# Patient Record
Sex: MALE | Race: WHITE | Employment: OTHER | URBAN - METROPOLITAN AREA
[De-identification: names, ages, dates, MRNs, and addresses within clinical notes are randomized per-mention and may not be internally consistent; named-entity substitution may affect disease eponyms.]

---

## 2017-07-05 LAB — DIABETIC RETINOPATHY: NORMAL

## 2018-01-30 PROBLEM — Q90.9 DOWN SYNDROME: Status: ACTIVE | Noted: 2018-01-30

## 2018-01-30 RX ORDER — FEXOFENADINE HCL 180 MG/1
180 TABLET ORAL
COMMUNITY
End: 2018-02-05 | Stop reason: CLARIF

## 2018-01-30 RX ORDER — FEXOFENADINE HCL AND PSEUDOEPHEDRINE HCI 60; 120 MG/1; MG/1
1 TABLET, EXTENDED RELEASE ORAL
COMMUNITY
Start: 2017-10-13 | End: 2018-02-05 | Stop reason: CLARIF

## 2018-01-30 RX ORDER — LAMOTRIGINE 25 MG/1
TABLET ORAL
COMMUNITY
Start: 2017-07-24 | End: 2019-01-14

## 2018-01-30 RX ORDER — LANCETS 30 GAUGE
EACH MISCELLANEOUS
COMMUNITY
Start: 2017-06-07 | End: 2022-01-12

## 2018-01-30 RX ORDER — TRAZODONE HYDROCHLORIDE 100 MG/1
TABLET ORAL
COMMUNITY
Start: 2017-07-10

## 2018-01-30 RX ORDER — FLUCONAZOLE 100 MG/1
100 TABLET ORAL
COMMUNITY
Start: 2015-04-18 | End: 2018-02-05 | Stop reason: CLARIF

## 2018-01-30 RX ORDER — LORATADINE 10 MG/1
TABLET ORAL
COMMUNITY
Start: 2017-07-10 | End: 2018-08-23

## 2018-01-30 RX ORDER — VENLAFAXINE HYDROCHLORIDE 150 MG/1
150 CAPSULE, EXTENDED RELEASE ORAL DAILY
COMMUNITY
Start: 2010-12-02

## 2018-01-30 RX ORDER — LISINOPRIL AND HYDROCHLOROTHIAZIDE 12.5; 1 MG/1; MG/1
TABLET ORAL
COMMUNITY
Start: 2018-01-02 | End: 2019-01-14 | Stop reason: SDUPTHER

## 2018-01-30 RX ORDER — BLOOD SUGAR DIAGNOSTIC
STRIP MISCELLANEOUS
COMMUNITY
Start: 2017-10-31 | End: 2018-04-23 | Stop reason: SDUPTHER

## 2018-01-30 RX ORDER — LEVOTHYROXINE SODIUM 112 UG/1
TABLET ORAL
COMMUNITY
Start: 2017-10-31 | End: 2018-04-23 | Stop reason: SDUPTHER

## 2018-01-30 RX ORDER — OMEGA-3-ACID ETHYL ESTERS 1 G/1
CAPSULE, LIQUID FILLED ORAL
COMMUNITY
Start: 2017-09-06 | End: 2018-02-05 | Stop reason: CLARIF

## 2018-02-05 ENCOUNTER — OFFICE VISIT (OUTPATIENT)
Dept: ENDOCRINOLOGY | Age: 33
End: 2018-02-05

## 2018-02-05 VITALS — DIASTOLIC BLOOD PRESSURE: 74 MMHG | HEIGHT: 78 IN | HEART RATE: 112 BPM | SYSTOLIC BLOOD PRESSURE: 131 MMHG

## 2018-02-05 DIAGNOSIS — R80.9 MICROALBUMINURIA: ICD-10-CM

## 2018-02-05 DIAGNOSIS — Z79.4 TYPE 2 DIABETES MELLITUS WITH DIABETIC POLYNEUROPATHY, WITH LONG-TERM CURRENT USE OF INSULIN (HCC): Primary | ICD-10-CM

## 2018-02-05 DIAGNOSIS — E03.9 HYPOTHYROIDISM, UNSPECIFIED TYPE: ICD-10-CM

## 2018-02-05 DIAGNOSIS — N18.2 CKD (CHRONIC KIDNEY DISEASE) STAGE 2, GFR 60-89 ML/MIN: ICD-10-CM

## 2018-02-05 DIAGNOSIS — I10 ESSENTIAL HYPERTENSION: ICD-10-CM

## 2018-02-05 DIAGNOSIS — E88.81 INSULIN RESISTANCE: ICD-10-CM

## 2018-02-05 DIAGNOSIS — E11.42 TYPE 2 DIABETES MELLITUS WITH DIABETIC POLYNEUROPATHY, WITH LONG-TERM CURRENT USE OF INSULIN (HCC): Primary | ICD-10-CM

## 2018-02-05 DIAGNOSIS — F90.9 ATTENTION DEFICIT HYPERACTIVITY DISORDER (ADHD), UNSPECIFIED ADHD TYPE: ICD-10-CM

## 2018-02-05 DIAGNOSIS — E55.9 VITAMIN D DEFICIENCY: ICD-10-CM

## 2018-02-05 PROBLEM — E88.819 INSULIN RESISTANCE: Status: ACTIVE | Noted: 2018-02-05

## 2018-02-05 PROCEDURE — 99215 OFFICE O/P EST HI 40 MIN: CPT | Performed by: INTERNAL MEDICINE

## 2018-02-05 ASSESSMENT — PATIENT HEALTH QUESTIONNAIRE - PHQ9
SUM OF ALL RESPONSES TO PHQ QUESTIONS 1-9: 0
SUM OF ALL RESPONSES TO PHQ9 QUESTIONS 1 & 2: 0
2. FEELING DOWN, DEPRESSED OR HOPELESS: 0
1. LITTLE INTEREST OR PLEASURE IN DOING THINGS: 0

## 2018-02-05 NOTE — PROGRESS NOTES
pain, no muscle cramps, no muscle pain, no bone pain, no fractures  Integument/Breast: hair loss, but no skin rashes, no skin lesions, no itching, no dry skin, no breast pain, no breast mass, no skin hives, no skin discoloration, no nipple discharge  Neurological: no numbness, no tingling, no weakness, no confusion, no headaches, no dizziness, no fainting, no tremors, no decrease in memory, no balance problems  Psychiatric: no anxiety, no depression, no insomnia, no change in personality, no emotional problems, no stress  Hematologic/Lymphatic: no tendency for easy bleeding, no swollen lymph nodes, no tendency for easy bruising  Immunology: no seasonal allergies, no frequent infections, no frequent illnesses  Endocrine: no temperature intolerance no hirsutism, no hot flashes    OBJECTIVE:  /74 (Site: Right Arm, Position: Sitting)   Pulse 112   Ht 6' 6\" (1.981 m)    Wt Readings from Last 3 Encounters:   No data found for [employer]       Constitutional: no acute distress, well appearing and well nourished  Psychiatric: oriented to person, place and time, judgement and insight and normal, recent and remote memory and intact and mood and affect are normal  Skin: skin and subcutaneous tissue is normal without mass, normal turgor  Head and Face: examination of head and face revealed no abnormalities  Eyes: no lid or conjunctival swelling, erythema or discharge, pupils are normal, equal, round, reactive to light  Ears/Nose: external inspection of ears and nose revealed no abnormalities, hearing is grossly normal  Oropharynx/Mouth/Face: lips, tongue and gums are normal with no lesions, the voice quality was normal  Neck: neck is supple and symmetric, with midline trachea and no masses, thyroid is normal  Lymphatics: normal cervical lymph nodes, normal supraclavicular nodes  Pulmonary: no increased work of breathing or signs of respiratory distress, lungs are clear to auscultation  Cardiovascular: normal heart rate and

## 2018-02-07 ENCOUNTER — TELEPHONE (OUTPATIENT)
Dept: ENDOCRINOLOGY | Age: 33
End: 2018-02-07

## 2018-02-07 NOTE — TELEPHONE ENCOUNTER
Patients mother called and stated that the patient needs to have his insulin called into [facility] [phone].

## 2018-02-07 NOTE — TELEPHONE ENCOUNTER
Called mom and advised that rosanne uses 35 unts BID of Humalog U 500 he can stay on his previous dose

## 2018-02-26 RX ORDER — OMEGA-3-ACID ETHYL ESTERS 1 G/1
CAPSULE, LIQUID FILLED ORAL
Qty: 120 CAPSULE | Refills: 5 | Status: SHIPPED | OUTPATIENT
Start: 2018-02-26 | End: 2018-08-15 | Stop reason: SDUPTHER

## 2018-02-26 RX ORDER — ERGOCALCIFEROL 1.25 MG/1
CAPSULE ORAL
Qty: 4 CAPSULE | Refills: 5 | Status: SHIPPED | OUTPATIENT
Start: 2018-02-26 | End: 2018-08-15 | Stop reason: SDUPTHER

## 2018-04-23 DIAGNOSIS — E13.42 DIABETIC POLYNEUROPATHY ASSOCIATED WITH OTHER SPECIFIED DIABETES MELLITUS (HCC): ICD-10-CM

## 2018-04-23 DIAGNOSIS — E03.9 HYPOTHYROIDISM, UNSPECIFIED TYPE: Primary | ICD-10-CM

## 2018-04-23 RX ORDER — SYRINGE-NEEDLE,INSULIN,0.5 ML 31 GX5/16"
SYRINGE, EMPTY DISPOSABLE MISCELLANEOUS
Qty: 100 EACH | Refills: 5 | Status: SHIPPED | OUTPATIENT
Start: 2018-04-23 | End: 2019-01-14 | Stop reason: SDUPTHER

## 2018-04-23 RX ORDER — LEVOTHYROXINE SODIUM 112 UG/1
TABLET ORAL
Qty: 30 TABLET | Refills: 5 | Status: SHIPPED | OUTPATIENT
Start: 2018-04-23 | End: 2018-10-11 | Stop reason: SDUPTHER

## 2018-04-30 RX ORDER — INSULIN HUMAN 500 [IU]/ML
INJECTION, SOLUTION SUBCUTANEOUS
Qty: 20 ML | Refills: 4 | Status: SHIPPED | OUTPATIENT
Start: 2018-04-30 | End: 2018-08-08 | Stop reason: SDUPTHER

## 2018-05-04 ENCOUNTER — HOSPITAL ENCOUNTER (OUTPATIENT)
Dept: OTHER | Age: 33
Discharge: HOME | End: 2018-05-04
Attending: INTERNAL MEDICINE | Admitting: INTERNAL MEDICINE

## 2018-05-04 DIAGNOSIS — R80.9 MICROALBUMINURIA: ICD-10-CM

## 2018-05-04 DIAGNOSIS — E03.9 HYPOTHYROIDISM, UNSPECIFIED TYPE: ICD-10-CM

## 2018-05-04 DIAGNOSIS — E11.42 TYPE 2 DIABETES MELLITUS WITH DIABETIC POLYNEUROPATHY, WITH LONG-TERM CURRENT USE OF INSULIN (HCC): ICD-10-CM

## 2018-05-04 DIAGNOSIS — Z79.4 TYPE 2 DIABETES MELLITUS WITH DIABETIC POLYNEUROPATHY, WITH LONG-TERM CURRENT USE OF INSULIN (HCC): ICD-10-CM

## 2018-05-04 LAB
A/G RATIO: 1.3 (ref 1.1–2.2)
ALBUMIN SERPL-MCNC: 4.4 G/DL (ref 3.4–5)
ALP BLD-CCNC: 50 U/L (ref 40–129)
ALT SERPL-CCNC: 59 U/L (ref 10–40)
ANION GAP SERPL CALCULATED.3IONS-SCNC: 16 MMOL/L (ref 3–16)
AST SERPL-CCNC: 45 U/L (ref 15–37)
BILIRUB SERPL-MCNC: 0.6 MG/DL (ref 0–1)
BUN BLDV-MCNC: 10 MG/DL (ref 7–20)
CALCIUM SERPL-MCNC: 9.3 MG/DL (ref 8.3–10.6)
CHLORIDE BLD-SCNC: 98 MMOL/L (ref 99–110)
CHOLESTEROL, TOTAL: 196 MG/DL (ref 0–199)
CO2: 24 MMOL/L (ref 21–32)
CREAT SERPL-MCNC: 0.8 MG/DL (ref 0.9–1.3)
CREATININE URINE: 245.5 MG/DL (ref 39–259)
GFR AFRICAN AMERICAN: >60
GFR NON-AFRICAN AMERICAN: >60
GLOBULIN: 3.3 G/DL
GLUCOSE BLD-MCNC: 178 MG/DL (ref 70–99)
HDLC SERPL-MCNC: 34 MG/DL (ref 40–60)
LDL CHOLESTEROL CALCULATED: 109 MG/DL
MICROALBUMIN UR-MCNC: 28.4 MG/DL
MICROALBUMIN/CREAT UR-RTO: 115.7 MG/G (ref 0–30)
POTASSIUM SERPL-SCNC: 4.4 MMOL/L (ref 3.5–5.1)
SODIUM BLD-SCNC: 138 MMOL/L (ref 136–145)
TOTAL PROTEIN: 7.7 G/DL (ref 6.4–8.2)
TRIGL SERPL-MCNC: 266 MG/DL (ref 0–150)
VLDLC SERPL CALC-MCNC: 53 MG/DL

## 2018-05-05 LAB
ESTIMATED AVERAGE GLUCOSE: 185.8 MG/DL
HBA1C MFR BLD: 8.1 %

## 2018-05-06 LAB — TSH, 3RD GENERATION: 2.31 MU/L (ref 0.3–4)

## 2018-05-07 ENCOUNTER — OFFICE VISIT (OUTPATIENT)
Dept: ENDOCRINOLOGY | Age: 33
End: 2018-05-07

## 2018-05-07 VITALS
WEIGHT: 315 LBS | BODY MASS INDEX: 36.45 KG/M2 | SYSTOLIC BLOOD PRESSURE: 124 MMHG | HEART RATE: 112 BPM | HEIGHT: 78 IN | DIASTOLIC BLOOD PRESSURE: 78 MMHG

## 2018-05-07 DIAGNOSIS — E11.29 TYPE 2 DIABETES MELLITUS WITH MICROALBUMINURIA, WITH LONG-TERM CURRENT USE OF INSULIN (HCC): Primary | ICD-10-CM

## 2018-05-07 DIAGNOSIS — R79.89 ELEVATED LIVER FUNCTION TESTS: ICD-10-CM

## 2018-05-07 DIAGNOSIS — R80.9 TYPE 2 DIABETES MELLITUS WITH MICROALBUMINURIA, WITH LONG-TERM CURRENT USE OF INSULIN (HCC): Primary | ICD-10-CM

## 2018-05-07 DIAGNOSIS — E55.9 VITAMIN D DEFICIENCY: ICD-10-CM

## 2018-05-07 DIAGNOSIS — Z79.4 TYPE 2 DIABETES MELLITUS WITH MICROALBUMINURIA, WITH LONG-TERM CURRENT USE OF INSULIN (HCC): Primary | ICD-10-CM

## 2018-05-07 PROCEDURE — 99214 OFFICE O/P EST MOD 30 MIN: CPT | Performed by: INTERNAL MEDICINE

## 2018-05-07 ASSESSMENT — PATIENT HEALTH QUESTIONNAIRE - PHQ9
2. FEELING DOWN, DEPRESSED OR HOPELESS: 1
SUM OF ALL RESPONSES TO PHQ9 QUESTIONS 1 & 2: 2
SUM OF ALL RESPONSES TO PHQ QUESTIONS 1-9: 2
1. LITTLE INTEREST OR PLEASURE IN DOING THINGS: 1

## 2018-05-22 RX ORDER — LIRAGLUTIDE 6 MG/ML
INJECTION, SOLUTION SUBCUTANEOUS
Qty: 5 PEN | Refills: 3 | Status: SHIPPED | OUTPATIENT
Start: 2018-05-22 | End: 2018-08-23

## 2018-07-23 RX ORDER — METFORMIN HYDROCHLORIDE 500 MG/1
TABLET, EXTENDED RELEASE ORAL
Qty: 120 TABLET | Refills: 3 | Status: SHIPPED | OUTPATIENT
Start: 2018-07-23 | End: 2018-11-12 | Stop reason: SDUPTHER

## 2018-08-08 RX ORDER — INSULIN HUMAN 500 [IU]/ML
INJECTION, SOLUTION SUBCUTANEOUS
Qty: 20 ML | Refills: 4 | Status: SHIPPED | OUTPATIENT
Start: 2018-08-08 | End: 2019-01-04 | Stop reason: SDUPTHER

## 2018-08-16 RX ORDER — ERGOCALCIFEROL 1.25 MG/1
CAPSULE ORAL
Qty: 4 CAPSULE | Refills: 5 | Status: SHIPPED | OUTPATIENT
Start: 2018-08-16 | End: 2019-02-06 | Stop reason: SDUPTHER

## 2018-08-16 RX ORDER — OMEGA-3-ACID ETHYL ESTERS 1 G/1
CAPSULE, LIQUID FILLED ORAL
Qty: 120 CAPSULE | Refills: 5 | Status: SHIPPED | OUTPATIENT
Start: 2018-08-16 | End: 2019-04-23

## 2018-08-20 ENCOUNTER — HOSPITAL ENCOUNTER (OUTPATIENT)
Dept: OTHER | Age: 33
Discharge: HOME | End: 2018-08-20
Attending: INTERNAL MEDICINE | Admitting: INTERNAL MEDICINE

## 2018-08-20 LAB
A/G RATIO: 1.4 (ref 1.1–2.2)
ALBUMIN SERPL-MCNC: 4.4 G/DL (ref 3.4–5)
ALP BLD-CCNC: 47 U/L (ref 40–129)
ALT SERPL-CCNC: 50 U/L (ref 10–40)
ANION GAP SERPL CALCULATED.3IONS-SCNC: 13 MMOL/L (ref 3–16)
AST SERPL-CCNC: 29 U/L (ref 15–37)
BILIRUB SERPL-MCNC: 0.4 MG/DL (ref 0–1)
BUN BLDV-MCNC: 12 MG/DL (ref 7–20)
CALCIUM SERPL-MCNC: 9.4 MG/DL (ref 8.3–10.6)
CHLORIDE BLD-SCNC: 102 MMOL/L (ref 99–110)
CHOLESTEROL, TOTAL: 184 MG/DL (ref 0–199)
CO2: 25 MMOL/L (ref 21–32)
CREAT SERPL-MCNC: 0.8 MG/DL (ref 0.9–1.3)
CREATININE URINE: 185.4 MG/DL (ref 39–259)
ESTIMATED AVERAGE GLUCOSE: 168.6 MG/DL
GFR AFRICAN AMERICAN: >60
GFR NON-AFRICAN AMERICAN: >60
GLOBULIN: 3.1 G/DL
GLUCOSE BLD-MCNC: 178 MG/DL (ref 70–99)
HBA1C MFR BLD: 7.5 %
HDLC SERPL-MCNC: 34 MG/DL (ref 40–60)
LDL CHOLESTEROL CALCULATED: 107 MG/DL
MICROALBUMIN UR-MCNC: 23.5 MG/DL
MICROALBUMIN/CREAT UR-RTO: 126.8 MG/G (ref 0–30)
POTASSIUM SERPL-SCNC: 4.3 MMOL/L (ref 3.5–5.1)
SODIUM BLD-SCNC: 140 MMOL/L (ref 136–145)
TOTAL PROTEIN: 7.5 G/DL (ref 6.4–8.2)
TRIGL SERPL-MCNC: 215 MG/DL (ref 0–150)
VITAMIN D 25-HYDROXY: 33 NG/ML
VLDLC SERPL CALC-MCNC: 43 MG/DL

## 2018-08-22 LAB — TSH, 3RD GENERATION: 2.19 MU/L (ref 0.3–4)

## 2018-08-23 ENCOUNTER — TELEPHONE (OUTPATIENT)
Dept: ENDOCRINOLOGY | Age: 33
End: 2018-08-23

## 2018-08-23 ENCOUNTER — OFFICE VISIT (OUTPATIENT)
Dept: ENDOCRINOLOGY | Age: 33
End: 2018-08-23

## 2018-08-23 VITALS
HEART RATE: 98 BPM | BODY MASS INDEX: 36.45 KG/M2 | WEIGHT: 315 LBS | HEIGHT: 78 IN | SYSTOLIC BLOOD PRESSURE: 94 MMHG | OXYGEN SATURATION: 98 % | DIASTOLIC BLOOD PRESSURE: 66 MMHG

## 2018-08-23 DIAGNOSIS — Z79.4 TYPE 2 DIABETES MELLITUS WITH DIABETIC POLYNEUROPATHY, WITH LONG-TERM CURRENT USE OF INSULIN (HCC): Primary | ICD-10-CM

## 2018-08-23 DIAGNOSIS — E06.3 HYPOTHYROIDISM DUE TO HASHIMOTO'S THYROIDITIS: ICD-10-CM

## 2018-08-23 DIAGNOSIS — F90.0 ATTENTION DEFICIT HYPERACTIVITY DISORDER (ADHD), PREDOMINANTLY INATTENTIVE TYPE: ICD-10-CM

## 2018-08-23 DIAGNOSIS — N18.2 CKD (CHRONIC KIDNEY DISEASE) STAGE 2, GFR 60-89 ML/MIN: ICD-10-CM

## 2018-08-23 DIAGNOSIS — E11.42 TYPE 2 DIABETES MELLITUS WITH DIABETIC POLYNEUROPATHY, WITH LONG-TERM CURRENT USE OF INSULIN (HCC): ICD-10-CM

## 2018-08-23 DIAGNOSIS — Z79.4 TYPE 2 DIABETES MELLITUS WITH DIABETIC POLYNEUROPATHY, WITH LONG-TERM CURRENT USE OF INSULIN (HCC): ICD-10-CM

## 2018-08-23 DIAGNOSIS — E55.9 VITAMIN D DEFICIENCY: ICD-10-CM

## 2018-08-23 DIAGNOSIS — E78.2 MIXED HYPERLIPIDEMIA: ICD-10-CM

## 2018-08-23 DIAGNOSIS — E03.8 HYPOTHYROIDISM DUE TO HASHIMOTO'S THYROIDITIS: ICD-10-CM

## 2018-08-23 DIAGNOSIS — E11.42 TYPE 2 DIABETES MELLITUS WITH DIABETIC POLYNEUROPATHY, WITH LONG-TERM CURRENT USE OF INSULIN (HCC): Primary | ICD-10-CM

## 2018-08-23 PROBLEM — E78.5 HYPERLIPIDEMIA: Status: ACTIVE | Noted: 2018-08-23

## 2018-08-23 PROCEDURE — 99215 OFFICE O/P EST HI 40 MIN: CPT | Performed by: INTERNAL MEDICINE

## 2018-08-23 RX ORDER — BLOOD-GLUCOSE METER
KIT MISCELLANEOUS
Qty: 500 STRIP | Refills: 5 | Status: SHIPPED | OUTPATIENT
Start: 2018-08-23 | End: 2019-01-14 | Stop reason: SDUPTHER

## 2018-08-23 NOTE — PROGRESS NOTES
Saba Allen is a 29 y.o. male who presents for Type 2 diabetes mellitus. Pt is seen for diabetes . Pt diagnosed with diabetes at age 17 years he was started on Oral pills metfromin for years and then he was switched to insulin he was on 70/30 combination LYNNETTE is a high function autistic adult , he was  followed at [facility] past , and he has a twin brother who is a diabetic too. Pt apparently lives with his mom but still fixes his own meals and skips his insulin doses and doesn't follow a diabetic diet and doesn't exercise much either ---Mom is a diabetic too but she lets Jomar Blanco make his own decision and according to mom she has noticed a lot of noncompliance on pt's part . He is also noted to have Vit d level was only 5 initially   He also has hypertension and is on meds   He was diagnoseed with ADHD and follows with his PCP   He  lost 40 lbs in summer 2016 and his finger stick blood glucose  improved   He is on 15--20 units of U 500 bid   He has not lost any weight since last visit but has not been exercising at all     INTERIM:    Diabetes   He presents for his follow-up diabetic visit. He has type 2 diabetes mellitus. No MedicAlert identification noted. The initial diagnosis of diabetes was made 18 years ago. His disease course has been stable. Hypoglycemia symptoms include nervousness/anxiousness and sweats. Pertinent negatives for hypoglycemia include no speech difficulty or tremors. Associated symptoms include fatigue and weakness. There are no hypoglycemic complications. Pertinent negatives for hypoglycemia complications include no required glucagon injection. Symptoms are stable. Diabetic complications include nephropathy. Risk factors for coronary artery disease include diabetes mellitus, dyslipidemia, hypertension and male sex. Current diabetic treatment includes intensive insulin program. He is compliant with treatment some of the time. He is following a high fat/cholesterol diet.  When asked History:   Procedure Laterality Date    APPENDECTOMY      TYMPANOSTOMY TUBE PLACEMENT       Social History     Social History    Marital status: Single     Spouse name: N/A    Number of children: N/A    Years of education: N/A     Occupational History    Not on file. Social History Main Topics    Smoking status: Never Smoker    Smokeless tobacco: Never Used    Alcohol use No    Drug use: No    Sexual activity: Not on file     Other Topics Concern    Not on file     Social History Narrative    No narrative on file     Family History   Problem Relation Age of Onset    Diabetes Mother     High Blood Pressure Mother     High Blood Pressure Father     Other Maternal Grandmother         lymphoma    Heart Disease Maternal Grandfather      Current Outpatient Prescriptions   Medication Sig Dispense Refill    glucagon 1 MG injection Inject 1 mg into the skin See Admin Instructions Follow package directions for low blood sugar. 1 kit 3    liraglutide-weight management 18 MG/3ML SOPN Titrate dose by I click weekly until he gets 3 mg daily 9 pen 3    omega-3 acid ethyl esters (LOVAZA) 1 g capsule TAKE 4 CAPSULES DAILY AS DIRECTED (PRESCRIBER OK NEEDED FOR NEXT REFILL) 120 capsule 5    vitamin D (ERGOCALCIFEROL) 25647 units CAPS capsule TAKE 1 CAPSULE (50,000 UNITS TOTAL) BY MOUTH ONCE A WEEK.  (PRESCRIBER OK NEEDED FOR NEXT REFILL) 4 capsule 5    HUMULIN R 500 UNIT/ML concentrated injection vial INJECT 175 UNITS(DRAW UP TO 35 UNIT LINE ON U-100 SYRINGE) THREE TIMES A DAY **REFRIGERATE** (PRESCRIBER OK NEEDED FOR NEXT REFILL) 20 mL 4    metFORMIN (GLUCOPHAGE-XR) 500 MG extended release tablet 2 TABLETS 2 TIMES A DAY (PRESCRIBER OK IS NEEDED FOR NEXT FILL) 120 tablet 3    levothyroxine (SYNTHROID) 112 MCG tablet TAKE ONE TABLET DAILY (PRESCRIBER OK IS NEEDED FOR NEXT FILL) 30 tablet 5    TRUEPLUS INSULIN SYRINGE 31G X 5/16\" 0.5 ML MISC USE WITH INSULIN THREE TIMES A DAY (PRESCRIBER OK IS NEEDED FOR NEXT FILL) 100 each 5    Blood Glucose Monitoring Suppl (ACURA BLOOD GLUCOSE METER) w/Device KIT Freestyle Lite Use as instructed to test 4 times per day. DX: E11.9      glucose blood VI test strips (EXACTECH TEST) strip USE TO TEST 4-6 TIMES DAILY      glucagon (GLUCAGON EMERGENCY) 1 MG injection USE IN CASE OF SEVERE HYCPOGLYCEMIA      lamoTRIgine (LAMICTAL) 25 MG tablet 100mg at night      Lancets MISC Use 4 times a day      lisdexamfetamine (VYVANSE) 50 MG capsule Take 60 mg by mouth .  lisinopril-hydrochlorothiazide (PRINZIDE;ZESTORETIC) 10-12.5 MG per tablet 1 TABLET ONCE DAILY (NEW RX OR MD NEEDS TO BE CONTACTED FOR MORE REFILLS)      traZODone (DESYREL) 100 MG tablet TAKE 1 TO 2 TABLETS BY MOUTH AT BEDTIME **MAY REFILL** **INCREASE IN DIRECTIONS**      venlafaxine (EFFEXOR XR) 150 MG extended release capsule Take 150 mg by mouth      FREESTYLE LITE strip USE TO CHECK BLOOD SUGAR 6 TIMES DAILY 500 strip 5     No current facility-administered medications for this visit.       Allergies   Allergen Reactions    Amoxicillin      Bladder infection     Family Status   Relation Status    Mother Alive    Father Alive    [place] (Not Specified)    [facility] (Not Specified)    Sister Alive    Brother Alive       Review of Systems  Constitutional: no fatigue, no fever, no recent weight gain, no recent weight loss, no changes in appetite  Eyes: no eye pain, no change in vision, no eye redness, no eye irritation, no double vision  Ears, nose, throat: no nasal congestion, no sore throat, no earache, no decrease in hearing, no hoarseness, no dry mouth, no sinus problems, no difficulty swallowing, no neck lumps, no dental problems, no mouth sores, no ringing in ears  Pulmonary: no shortness of breath, no wheezing, no dyspnea on exertion, no cough  Cardiovascular: no chest pain, no lower extremity edema, no orthopnea, no intermittent leg claudication, no palpitations  Gastrointestinal: no abdominal pain, no nausea, no vomiting, no diarrhea, no constipation, no dysphagia, no heartburn, no bloating  Genitourinary: no dysuria, no urinary incontinence, no urinary hesitancy, no urinary frequency, no feelings of urinary urgency, no nocturia  Musculoskeletal: no joint swelling, no joint stiffness, no joint pain, no muscle cramps, no muscle pain, no bone pain, no fractures  Integument/Breast: hair loss, but no skin rashes, no skin lesions, no itching, no dry skin, no breast pain, no breast mass, no skin hives, no skin discoloration, no nipple discharge  Neurological: no numbness, no tingling, no weakness, no confusion, no headaches, no dizziness, no fainting, no tremors, no decrease in memory, no balance problems  Psychiatric: no anxiety, no depression, no insomnia, no change in personality, no emotional problems, no stress  Hematologic/Lymphatic: no tendency for easy bleeding, no swollen lymph nodes, no tendency for easy bruising  Immunology: no seasonal allergies, no frequent infections, no frequent illnesses  Endocrine: no temperature intolerance no hirsutism, no hot flashes    OBJECTIVE:  BP 94/66   Pulse 98   Ht 6' 6\" (1.981 m)   Wt (!) 387 lb (175.5 kg)   SpO2 98%   BMI 44.72 kg/m²    Wt Readings from Last 3 Encounters:   08/23/18 (!) 387 lb (175.5 kg)   05/07/18 (!) 374 lb 6.4 oz (169.8 kg)       Constitutional: no acute distress, well appearing and well nourished  Psychiatric: oriented to person, place and time, judgement and insight and normal, recent and remote memory and intact and mood and affect are normal  Skin: skin and subcutaneous tissue is normal without mass, normal turgor  Head and Face: examination of head and face revealed no abnormalities  Eyes: no lid or conjunctival swelling, erythema or discharge, pupils are normal, equal, round, reactive to light  Ears/Nose: external inspection of ears and nose revealed no abnormalities, hearing is grossly normal  Oropharynx/Mouth/Face: lips, tongue and gums are normal with no lesions, the voice quality was normal  Neck: neck is supple and symmetric, with midline trachea and no masses, thyroid is normal  Lymphatics: normal cervical lymph nodes, normal supraclavicular nodes  Pulmonary: no increased work of breathing or signs of respiratory distress, lungs are clear to auscultation  Cardiovascular: normal heart rate and rhythm, normal S1 and S2, no murmurs and pedal pulses and 2+ bilaterally, No edema  Abdomen: abdomen is soft, non-tender with no masses  Musculoskeletal: normal gait and station and exam of the digits and nails are normal  Neurological: normal coordination and normal general cortical function      Lab Results   Component Value Date    LABA1C 7.5 08/20/2018    LABA1C 8.1 05/04/2018    LABA1C 8.5 03/21/2011    LABA1C 9.2 09/29/2010         ASSESSMENT/PLAN:  TYPE 2 DIABETES WITH COMPLICATIONS - Microalbuminuria --nephropathy a1c 7.5    LYNNETTE is on U 500 insulin he has been taking 35  units BID to TID --- based on his meal plans and compliace efforts   He was advised to take it every 8 hours   On metformin 1000 twice a day he claims to use it regularly now   eliana didn't get approved   He is taking Victoza ( left over at home from his brother ) with lo wt loss but improved glycemia so he will lik eto continue with it   I gave him a rx for this and advised that he will have to reduce regular insulin   Hypoglycemia protocol reviewed in detail with patient Patient was advised to carry glucose tablets and also have glucagon emergency kit. Provided with RX for glucagon. Patient was advised that sending of his fingerstick blood glucose logs is crucial in management of his diabetes.  We will adjust his dose of insulin according to sent data.   -advised to have annual dilated eye exam 2017 no retinopathy   - microalb/creat ratio was elevated in feb 2017 --follows with Dr Chelly Robbins   -foot exam performed revealed mild distal neuropathy --feb 2017 he follows with Podiatry Dr Kinjal Kerr ---  -for his [place] hump I did MSCx 2 which came back abnl I am not positive if he even did at the right time saw her in jan 2018   -1 mg dex suppression test was normal cortisol was <0.1. Hypothyroidism   On 112 mcg daily Pt appears clinically and biochemically euthyroid on current dose of Levothyroxine , will continue on same dose. Pt was advised to avoid taking Levothyroxine with concomitant Iron and calcium containing supplements and try taking 1/2 hr before eating.    --thyroid uls was normal 8/2014   his Abs were negative. Elevated LFTS   Advised to follow up with PCP--? NAFLD   He will discuss with Dr Nestor Cool  On mom's request I ordered ultrasound of the liver she was advised that patient will have to follow up with the primary care physician for the results of the as she plans to schedule it at [address]   stable     Hypertension   He is on lisinopril /hct     Hypertriglyceridemia   He has been taking Lovaza 4 gram daily and have repeat labs in 3 months   LDL 85>>62 -Tgs 289>>330  He has not been taking it regularly     VIT D DEF   levels improved from 5 ----24 >>26   Advised to take 5000 iu dialy   advised to increase sun exposure     OBesity   Advised to step up his diet and exercise.   He lost 20 lbs in summer 2016   But now has gained 10 lbs in the last 3 months   We discussed wt loss strategies in detail again and will discuss weight loss center next time again   Gave him samples of Saxenda    Depression   On effexor and welbutrin as per PCP            Reviewed and/or ordered clinical lab results Yes  Reviewed and/or ordered radiology tests Yes  Reviewed and/or ordered other diagnostic tests Yes  Made a decision to obtain old records Yes  Reviewed and summarized old records Yes      Gerson Medel was counseled regarding symptoms of current diagnosis, course and complications of disease if inadequately treated, side effects of medications, diagnosis, treatment options, and prognosis, risks, benefits, complications, and alternatives of treatment, labs, imaging and other studies and treatment targets and goals. He understands instructions and counseling. I spent more than 40  minutes with patient and more than 50 % of this time was spent discussing and providing counseling and coordinating care of patient's multiple health issues           These diagnosis were discussed and reviewed with the patient including the advantages of drug therapy. He was counseled at this visit on the following: diabetes complication prevention and foot care. Return in about 3 months (around 11/23/2018).

## 2018-08-23 NOTE — PATIENT INSTRUCTIONS
Patient Education        Hypoglycemia: Care Instructions  Your Care Instructions    Hypoglycemia means that your blood sugar is low and your body is not getting enough fuel. Some people get low blood sugar from not eating often enough. Some medicines to treat diabetes can cause low blood sugar. People who have had surgery on their stomachs or intestines may get hypoglycemia. Problems with the pancreas, kidneys, or liver also can cause low blood sugar. A snack or drink with sugar in it will raise your blood sugar and should ease your symptoms right away. Your doctor may recommend that you change or stop your medicines until you can get your blood sugar levels under control. In the long run, you may need to change your diet and eating habits so that you get enough fuel for your body throughout the day. Follow-up care is a key part of your treatment and safety. Be sure to make and go to all appointments, and call your doctor if you are having problems. It's also a good idea to know your test results and keep a list of the medicines you take. How can you care for yourself at home? · Learn to recognize the early signs of low blood sugar. Signs include:  ¨ Nausea. ¨ Hunger. ¨ Feeling nervous, irritable, or shaky. ¨ Cold, clammy, wet skin. ¨ Sweating (when you are not exercising). ¨ A fast heartbeat. ¨ Numbness or tingling of the fingertips or lips. · If you feel an episode of low blood sugar coming on, drink fruit juice or sugared (not diet) soda, or eat sugar in the form of candy, cubes, or tablets. [employer] are another [employer]. · Eat small, frequent meals so that you do not get too hungry between meals. · Balance extra exercise with eating more. · Keep a written record of your low blood sugar episodes, including when you last ate and what you ate, so that you can learn what causes your blood sugar to drop.   · Make sure your family, friends, and coworkers know the symptoms of low blood sugar and know what to do to get your sugar level up. · Wear medical alert jewelry that lists your condition. You can buy this at most drugstores. When should you call for help? Call 911 anytime you think you may need emergency care. For example, call if:    · You passed out (lost consciousness).     · You are confused or cannot think clearly.     · Your blood sugar is very high or very low.    Watch closely for changes in your health, and be sure to contact your doctor if:    · Your blood sugar stays outside the level your doctor set for you.     · You have any problems. Where can you learn more? Go to [email or web address] org and sign in to your [employer] account. Enter E030 in the [employer] box to learn more about \"Hypoglycemia: Care Instructions. \"     If you do not have an account, please click on the \"Sign Up Now\" link. Current as of: December 7, 2017  Content Version: 11.7  © 0090-7362 [employer]. Care instructions adapted under license by [facility] ([facility]). If you have questions about a medical condition or this instruction, always ask your healthcare professional. [address] any warranty or liability for your use of this information.

## 2018-10-08 NOTE — TELEPHONE ENCOUNTER
Fax from [employer] denial letter for Jason.  States a phone # for further discussion from the Dr. Krystle Chung also contains form for a request for Redetermination

## 2018-10-11 DIAGNOSIS — E03.9 HYPOTHYROIDISM, UNSPECIFIED TYPE: ICD-10-CM

## 2018-10-12 RX ORDER — LEVOTHYROXINE SODIUM 112 UG/1
TABLET ORAL
Qty: 30 TABLET | Refills: 5 | Status: SHIPPED | OUTPATIENT
Start: 2018-10-12 | End: 2019-01-14 | Stop reason: SDUPTHER

## 2018-11-13 RX ORDER — METFORMIN HYDROCHLORIDE 500 MG/1
TABLET, EXTENDED RELEASE ORAL
Qty: 120 TABLET | Refills: 3 | Status: SHIPPED | OUTPATIENT
Start: 2018-11-13 | End: 2019-01-14 | Stop reason: SDUPTHER

## 2018-12-03 DIAGNOSIS — E55.9 VITAMIN D DEFICIENCY: ICD-10-CM

## 2018-12-03 NOTE — TELEPHONE ENCOUNTER
Mom called and pt needs refill on his Victoza sent to [facility]     LOV    8-23-18  FOV  1-14-19

## 2018-12-06 ENCOUNTER — TELEPHONE (OUTPATIENT)
Dept: ENDOCRINOLOGY | Age: 33
End: 2018-12-06

## 2019-01-04 RX ORDER — INSULIN HUMAN 500 [IU]/ML
INJECTION, SOLUTION SUBCUTANEOUS
Qty: 20 ML | Refills: 5 | Status: SHIPPED | OUTPATIENT
Start: 2019-01-04 | End: 2019-04-23 | Stop reason: SDUPTHER

## 2019-01-07 ENCOUNTER — HOSPITAL ENCOUNTER (OUTPATIENT)
Age: 34
Discharge: HOME | End: 2019-01-07
Payer: COMMERCIAL

## 2019-01-07 ENCOUNTER — HOSPITAL ENCOUNTER (OUTPATIENT)
Dept: ULTRASOUND IMAGING | Age: 34
Discharge: HOME | End: 2019-01-07
Payer: COMMERCIAL

## 2019-01-07 DIAGNOSIS — E55.9 VITAMIN D DEFICIENCY: ICD-10-CM

## 2019-01-07 DIAGNOSIS — E03.8 HYPOTHYROIDISM DUE TO HASHIMOTO'S THYROIDITIS: ICD-10-CM

## 2019-01-07 DIAGNOSIS — E11.42 TYPE 2 DIABETES MELLITUS WITH DIABETIC POLYNEUROPATHY, WITH LONG-TERM CURRENT USE OF INSULIN (HCC): ICD-10-CM

## 2019-01-07 DIAGNOSIS — R79.89 ELEVATED LIVER FUNCTION TESTS: ICD-10-CM

## 2019-01-07 DIAGNOSIS — Z79.4 TYPE 2 DIABETES MELLITUS WITH DIABETIC POLYNEUROPATHY, WITH LONG-TERM CURRENT USE OF INSULIN (HCC): ICD-10-CM

## 2019-01-07 DIAGNOSIS — E06.3 HYPOTHYROIDISM DUE TO HASHIMOTO'S THYROIDITIS: ICD-10-CM

## 2019-01-07 DIAGNOSIS — N18.2 CKD (CHRONIC KIDNEY DISEASE) STAGE 2, GFR 60-89 ML/MIN: ICD-10-CM

## 2019-01-07 LAB
A/G RATIO: 1.3 (ref 1.1–2.2)
ALBUMIN SERPL-MCNC: 4.1 G/DL (ref 3.4–5)
ALP BLD-CCNC: 53 U/L (ref 40–129)
ALT SERPL-CCNC: 58 U/L (ref 10–40)
ANION GAP SERPL CALCULATED.3IONS-SCNC: 15 MMOL/L (ref 3–16)
AST SERPL-CCNC: 30 U/L (ref 15–37)
BILIRUB SERPL-MCNC: 0.4 MG/DL (ref 0–1)
BUN BLDV-MCNC: 10 MG/DL (ref 7–20)
CALCIUM SERPL-MCNC: 9.1 MG/DL (ref 8.3–10.6)
CHLORIDE BLD-SCNC: 98 MMOL/L (ref 99–110)
CHOLESTEROL, TOTAL: 162 MG/DL (ref 0–199)
CO2: 24 MMOL/L (ref 21–32)
CREAT SERPL-MCNC: 0.9 MG/DL (ref 0.9–1.3)
ESTIMATED AVERAGE GLUCOSE: 205.9 MG/DL
GFR AFRICAN AMERICAN: >60
GFR NON-AFRICAN AMERICAN: >60
GLOBULIN: 3.1 G/DL
GLUCOSE BLD-MCNC: 247 MG/DL (ref 70–99)
HBA1C MFR BLD: 8.8 %
HDLC SERPL-MCNC: 31 MG/DL (ref 40–60)
LDL CHOLESTEROL CALCULATED: 82 MG/DL
POTASSIUM SERPL-SCNC: 4.4 MMOL/L (ref 3.5–5.1)
SODIUM BLD-SCNC: 137 MMOL/L (ref 136–145)
TOTAL PROTEIN: 7.2 G/DL (ref 6.4–8.2)
TRIGL SERPL-MCNC: 243 MG/DL (ref 0–150)
TSH SERPL DL<=0.05 MIU/L-ACNC: 2.34 UIU/ML (ref 0.27–4.2)
VITAMIN D 25-HYDROXY: 34.8 NG/ML
VLDLC SERPL CALC-MCNC: 49 MG/DL

## 2019-01-07 PROCEDURE — 82306 VITAMIN D 25 HYDROXY: CPT

## 2019-01-07 PROCEDURE — 83036 HEMOGLOBIN GLYCOSYLATED A1C: CPT

## 2019-01-07 PROCEDURE — 84443 ASSAY THYROID STIM HORMONE: CPT

## 2019-01-07 PROCEDURE — 80053 COMPREHEN METABOLIC PANEL: CPT

## 2019-01-07 PROCEDURE — 76705 ECHO EXAM OF ABDOMEN: CPT

## 2019-01-07 PROCEDURE — 80061 LIPID PANEL: CPT

## 2019-01-07 PROCEDURE — 36415 COLL VENOUS BLD VENIPUNCTURE: CPT

## 2019-01-10 ENCOUNTER — TELEPHONE (OUTPATIENT)
Dept: ENDOCRINOLOGY | Age: 34
End: 2019-01-10

## 2019-01-14 ENCOUNTER — OFFICE VISIT (OUTPATIENT)
Dept: ENDOCRINOLOGY | Age: 34
End: 2019-01-14
Payer: COMMERCIAL

## 2019-01-14 DIAGNOSIS — E55.9 VITAMIN D DEFICIENCY: Primary | ICD-10-CM

## 2019-01-14 DIAGNOSIS — Z79.4 TYPE 2 DIABETES MELLITUS WITH DIABETIC POLYNEUROPATHY, WITH LONG-TERM CURRENT USE OF INSULIN (HCC): ICD-10-CM

## 2019-01-14 DIAGNOSIS — E13.42 DIABETIC POLYNEUROPATHY ASSOCIATED WITH OTHER SPECIFIED DIABETES MELLITUS (HCC): ICD-10-CM

## 2019-01-14 DIAGNOSIS — E03.9 HYPOTHYROIDISM, UNSPECIFIED TYPE: ICD-10-CM

## 2019-01-14 DIAGNOSIS — E66.01 MORBID OBESITY (HCC): ICD-10-CM

## 2019-01-14 DIAGNOSIS — E11.42 TYPE 2 DIABETES MELLITUS WITH DIABETIC POLYNEUROPATHY, WITH LONG-TERM CURRENT USE OF INSULIN (HCC): ICD-10-CM

## 2019-01-14 PROCEDURE — 99214 OFFICE O/P EST MOD 30 MIN: CPT | Performed by: INTERNAL MEDICINE

## 2019-01-14 RX ORDER — LISINOPRIL AND HYDROCHLOROTHIAZIDE 12.5; 1 MG/1; MG/1
TABLET ORAL
Qty: 30 TABLET | Refills: 5 | Status: SHIPPED | OUTPATIENT
Start: 2019-01-14 | End: 2021-03-19 | Stop reason: SDUPTHER

## 2019-01-14 RX ORDER — LEVOTHYROXINE SODIUM 112 UG/1
TABLET ORAL
Qty: 30 TABLET | Refills: 5 | Status: SHIPPED | OUTPATIENT
Start: 2019-01-14 | End: 2020-01-29

## 2019-01-14 RX ORDER — METFORMIN HYDROCHLORIDE 500 MG/1
TABLET, EXTENDED RELEASE ORAL
Qty: 120 TABLET | Refills: 3 | Status: SHIPPED | OUTPATIENT
Start: 2019-01-14 | End: 2019-07-03 | Stop reason: SDUPTHER

## 2019-01-14 RX ORDER — BLOOD SUGAR DIAGNOSTIC
STRIP MISCELLANEOUS
Qty: 100 EACH | Refills: 5 | Status: SHIPPED | OUTPATIENT
Start: 2019-01-14 | End: 2019-07-23 | Stop reason: SDUPTHER

## 2019-01-18 VITALS
DIASTOLIC BLOOD PRESSURE: 91 MMHG | WEIGHT: 315 LBS | HEART RATE: 98 BPM | BODY MASS INDEX: 36.45 KG/M2 | HEIGHT: 78 IN | SYSTOLIC BLOOD PRESSURE: 144 MMHG

## 2019-02-03 RX ORDER — ICOSAPENT ETHYL 1000 MG/1
2 CAPSULE ORAL 2 TIMES DAILY
Qty: 120 CAPSULE | Refills: 3 | Status: SHIPPED | OUTPATIENT
Start: 2019-02-03 | End: 2019-06-03 | Stop reason: SDUPTHER

## 2019-02-06 RX ORDER — OMEGA-3-ACID ETHYL ESTERS 1 G/1
CAPSULE, LIQUID FILLED ORAL
Qty: 120 CAPSULE | Refills: 5 | OUTPATIENT
Start: 2019-02-06

## 2019-02-07 RX ORDER — ERGOCALCIFEROL 1.25 MG/1
CAPSULE ORAL
Qty: 4 CAPSULE | Refills: 5 | Status: SHIPPED | OUTPATIENT
Start: 2019-02-07 | End: 2019-08-01 | Stop reason: SDUPTHER

## 2019-03-01 ENCOUNTER — TELEPHONE (OUTPATIENT)
Dept: ENDOCRINOLOGY | Age: 34
End: 2019-03-01

## 2019-04-05 ENCOUNTER — TELEPHONE (OUTPATIENT)
Dept: ENDOCRINOLOGY | Age: 34
End: 2019-04-05

## 2019-04-22 ENCOUNTER — HOSPITAL ENCOUNTER (OUTPATIENT)
Age: 34
Discharge: HOME | End: 2019-04-22
Payer: COMMERCIAL

## 2019-04-22 DIAGNOSIS — E55.9 VITAMIN D DEFICIENCY: ICD-10-CM

## 2019-04-22 DIAGNOSIS — E03.9 HYPOTHYROIDISM, UNSPECIFIED TYPE: ICD-10-CM

## 2019-04-22 DIAGNOSIS — E13.42 DIABETIC POLYNEUROPATHY ASSOCIATED WITH OTHER SPECIFIED DIABETES MELLITUS (HCC): ICD-10-CM

## 2019-04-22 DIAGNOSIS — E11.42 TYPE 2 DIABETES MELLITUS WITH DIABETIC POLYNEUROPATHY, WITH LONG-TERM CURRENT USE OF INSULIN (HCC): ICD-10-CM

## 2019-04-22 DIAGNOSIS — Z79.4 TYPE 2 DIABETES MELLITUS WITH DIABETIC POLYNEUROPATHY, WITH LONG-TERM CURRENT USE OF INSULIN (HCC): ICD-10-CM

## 2019-04-22 LAB
A/G RATIO: 1.3 (ref 1.1–2.2)
ALBUMIN SERPL-MCNC: 4.2 G/DL (ref 3.4–5)
ALP BLD-CCNC: 55 U/L (ref 40–129)
ALT SERPL-CCNC: 62 U/L (ref 10–40)
ANION GAP SERPL CALCULATED.3IONS-SCNC: 16 MMOL/L (ref 3–16)
AST SERPL-CCNC: 41 U/L (ref 15–37)
BILIRUB SERPL-MCNC: 0.4 MG/DL (ref 0–1)
BUN BLDV-MCNC: 11 MG/DL (ref 7–20)
CALCIUM SERPL-MCNC: 9.3 MG/DL (ref 8.3–10.6)
CHLORIDE BLD-SCNC: 100 MMOL/L (ref 99–110)
CHOLESTEROL, TOTAL: 187 MG/DL (ref 0–199)
CO2: 22 MMOL/L (ref 21–32)
CREAT SERPL-MCNC: 0.9 MG/DL (ref 0.9–1.3)
CREATININE URINE: 280.7 MG/DL (ref 39–259)
GFR AFRICAN AMERICAN: >60
GFR NON-AFRICAN AMERICAN: >60
GLOBULIN: 3.2 G/DL
GLUCOSE BLD-MCNC: 229 MG/DL (ref 70–99)
HDLC SERPL-MCNC: 31 MG/DL (ref 40–60)
LDL CHOLESTEROL CALCULATED: 102 MG/DL
MICROALBUMIN UR-MCNC: 27.9 MG/DL
MICROALBUMIN/CREAT UR-RTO: 99.4 MG/G (ref 0–30)
POTASSIUM SERPL-SCNC: 4.3 MMOL/L (ref 3.5–5.1)
SODIUM BLD-SCNC: 138 MMOL/L (ref 136–145)
TOTAL PROTEIN: 7.4 G/DL (ref 6.4–8.2)
TRIGL SERPL-MCNC: 272 MG/DL (ref 0–150)
TSH SERPL DL<=0.05 MIU/L-ACNC: 2.16 UIU/ML (ref 0.27–4.2)
VITAMIN D 25-HYDROXY: 32 NG/ML
VLDLC SERPL CALC-MCNC: 54 MG/DL

## 2019-04-22 PROCEDURE — 83036 HEMOGLOBIN GLYCOSYLATED A1C: CPT

## 2019-04-22 PROCEDURE — 84443 ASSAY THYROID STIM HORMONE: CPT

## 2019-04-22 PROCEDURE — 82306 VITAMIN D 25 HYDROXY: CPT

## 2019-04-22 PROCEDURE — 80061 LIPID PANEL: CPT

## 2019-04-22 PROCEDURE — 82570 ASSAY OF URINE CREATININE: CPT

## 2019-04-22 PROCEDURE — 80053 COMPREHEN METABOLIC PANEL: CPT

## 2019-04-22 PROCEDURE — 82043 UR ALBUMIN QUANTITATIVE: CPT

## 2019-04-22 PROCEDURE — 36415 COLL VENOUS BLD VENIPUNCTURE: CPT

## 2019-04-23 ENCOUNTER — OFFICE VISIT (OUTPATIENT)
Dept: ENDOCRINOLOGY | Age: 34
End: 2019-04-23
Payer: COMMERCIAL

## 2019-04-23 VITALS
HEIGHT: 78 IN | HEART RATE: 104 BPM | WEIGHT: 315 LBS | OXYGEN SATURATION: 96 % | BODY MASS INDEX: 36.45 KG/M2 | DIASTOLIC BLOOD PRESSURE: 88 MMHG | SYSTOLIC BLOOD PRESSURE: 116 MMHG

## 2019-04-23 DIAGNOSIS — E66.01 MORBID OBESITY (HCC): ICD-10-CM

## 2019-04-23 DIAGNOSIS — E55.9 VITAMIN D DEFICIENCY: ICD-10-CM

## 2019-04-23 DIAGNOSIS — E78.2 MIXED HYPERLIPIDEMIA: ICD-10-CM

## 2019-04-23 DIAGNOSIS — I10 ESSENTIAL HYPERTENSION: ICD-10-CM

## 2019-04-23 LAB
ESTIMATED AVERAGE GLUCOSE: 177.2 MG/DL
HBA1C MFR BLD: 7.8 %

## 2019-04-23 PROCEDURE — 99215 OFFICE O/P EST HI 40 MIN: CPT | Performed by: INTERNAL MEDICINE

## 2019-04-23 NOTE — PROGRESS NOTES
Jose Dumont is a 34 y.o. male who is seen for  for Type 2 diabetes mellitus, ess hypertension ,hyperlipidemia, hypothyroidism  and  severe obesity . Pt is seen for diabetes . Pt diagnosed with diabetes at age 17 years he was started on Oral pills metfromin for years and then he was switched to insulin he was on 70/30 combination LYNNETTE is a high function autistic adult , he was  followed at [facility] in the past , and he has a twin brother who is a diabetic too. Pt apparently lives with his mom but still fixes his own meals and skips his insulin doses and doesn't follow a diabetic diet and doesn't exercise much either ---Mom is a diabetic too but she lets Kervin Cuello make his own decision and according to mom she has noticed a lot of noncompliance on pt's part . He is also noted to have Vit d level was only 5 initially   He also has hypertension and is on meds   He was diagnoseed with ADHD and follows with his PCP   He  lost 40 lbs in summer 2016 and his finger stick blood glucose  improved , he has gained some of the weight back   Pt also has hyperlipidemia and is on     INTERIM:    Diabetes   He presents for his follow-up diabetic visit. He has type 2 diabetes mellitus. No MedicAlert identification noted. The initial diagnosis of diabetes was made 18 years ago. His disease course has been stable. Hypoglycemia symptoms include nervousness/anxiousness and sweats. Pertinent negatives for hypoglycemia include no speech difficulty or tremors. Associated symptoms include fatigue and weakness. There are no hypoglycemic complications. Pertinent negatives for hypoglycemia complications include no required glucagon injection. Symptoms are stable. Diabetic complications include nephropathy. Risk factors for coronary artery disease include diabetes mellitus, dyslipidemia, hypertension and male sex. Current diabetic treatment includes intensive insulin program. He is compliant with treatment some of the time.  He is following a polyneuropathy, with long-term current use of insulin (HCC)    Insulin resistance    Hyperlipidemia     Past Surgical History:   Procedure Laterality Date    APPENDECTOMY      TYMPANOSTOMY TUBE PLACEMENT       Social History     Socioeconomic History    Marital status: Single     Spouse name: Not on file    Number of children: Not on file    Years of education: Not on file    Highest education level: Not on file   Occupational History    Not on file   Social Needs    Financial resource strain: Not on file    Food insecurity:     Worry: Not on file     Inability: Not on file    Transportation needs:     Medical: Not on file     Non-medical: Not on file   Tobacco Use    Smoking status: Never Smoker    Smokeless tobacco: Never Used   Substance and Sexual Activity    Alcohol use: No    Drug use: No    Sexual activity: Not on file   Lifestyle    Physical activity:     Days per week: Not on file     Minutes per session: Not on file    Stress: Not on file   Relationships    Social connections:     Talks on phone: Not on file     Gets together: Not on file     Attends Faith service: Not on file     Active member of club or organization: Not on file     Attends meetings of clubs or organizations: Not on file     Relationship status: Not on file    Intimate partner violence:     Fear of current or ex partner: Not on file     Emotionally abused: Not on file     Physically abused: Not on file     Forced sexual activity: Not on file   Other Topics Concern    Not on file   Social History Narrative    Not on file     Family History   Problem Relation Age of Onset    Diabetes Mother     High Blood Pressure Mother     High Blood Pressure Father     Other Maternal Grandmother         lymphoma    Heart Disease Maternal Grandfather      Current Outpatient Medications   Medication Sig Dispense Refill    insulin regular human (HUMULIN R) 500 UNIT/ML concentrated injection vial INJECT 175 UNITS(DRAW UP TO 35 UNIT LINE ON U-100 SYRINGE) THREE TIMES A DAY (PRESCRIBER OK NEEDED FOR NEXT REFILL) 20 mL 5    liraglutide-weight management 18 MG/3ML SOPN 3 mg daily 9 pen 3    vitamin D (ERGOCALCIFEROL) 83503 units CAPS capsule TAKE 1 CAPSULE (50,000 UNITS TOTAL) BY MOUTH ONCE A WEEK. (NEW RX OR MD NEEDS TO BE CONTACTED FOR MORE REFILLS) 4 capsule 5    Icosapent Ethyl (VASCEPA) 1 g CAPS capsule Take 2 capsules by mouth 2 times daily 120 capsule 3    blood glucose test strips (FREESTYLE LITE) strip USE TO CHECK BLOOD SUGAR 6 TIMES DAILY 600 strip 5    metFORMIN (GLUCOPHAGE-XR) 500 MG extended release tablet 2 TABLETS 2 TIMES A DAY (PRESCRIBER OK IS NEEDED FOR NEXT FILL) 120 tablet 3    levothyroxine (SYNTHROID) 112 MCG tablet TAKE ONE TABLET DAILY (PRESCRIBER OK NEEDED FOR NEXT REFILL) 30 tablet 5    lisinopril-hydrochlorothiazide (PRINZIDE;ZESTORETIC) 10-12.5 MG per tablet 1 TABLET ONCE DAILY (NEW RX OR MD NEEDS TO BE CONTACTED FOR MORE REFILLS) 30 tablet 5    Insulin Syringe-Needle U-100 (TRUEPLUS INSULIN SYRINGE) 31G X 5/16\" 0.5 ML MISC USE WITH INSULIN THREE TIMES A DAY (PRESCRIBER OK IS NEEDED FOR NEXT FILL) 100 each 5    glucagon 1 MG injection Inject 1 mg into the skin See Admin Instructions Follow package directions for low blood sugar. 1 kit 3    glucose blood VI test strips (EXACTECH TEST) strip USE TO TEST 4-6 TIMES DAILY      glucagon (GLUCAGON EMERGENCY) 1 MG injection USE IN CASE OF SEVERE HYCPOGLYCEMIA      Lancets MISC Use 4 times a day      traZODone (DESYREL) 100 MG tablet TAKE 1 TO 2 TABLETS BY MOUTH AT BEDTIME **MAY REFILL** **INCREASE IN DIRECTIONS**      venlafaxine (EFFEXOR XR) 150 MG extended release capsule Take 150 mg by mouth       No current facility-administered medications for this visit.       Allergies   Allergen Reactions    Amoxicillin      Bladder infection     Family Status   Relation Name Status    Mother  Alive    Father  Alive    [place]  (Not Specified)    MGF  (Not Specified)    Sister  Alive    Brother  Alive       Review of Systems  Constitutional: no fatigue, no fever, no recent weight gain, no recent weight loss, no changes in appetite  Eyes: no eye pain, no change in vision, no eye redness, no eye irritation, no double vision  Ears, nose, throat: no nasal congestion, no sore throat, no earache, no decrease in hearing, no hoarseness, no dry mouth, no sinus problems, no difficulty swallowing, no neck lumps, no dental problems, no mouth sores, no ringing in ears  Pulmonary: no shortness of breath, no wheezing, no dyspnea on exertion, no cough  Cardiovascular: no chest pain, no lower extremity edema, no orthopnea, no intermittent leg claudication, no palpitations  Gastrointestinal: no abdominal pain, no nausea, no vomiting, no diarrhea, no constipation, no dysphagia, no heartburn, no bloating  Genitourinary: no dysuria, no urinary incontinence, no urinary hesitancy, no urinary frequency, no feelings of urinary urgency, no nocturia  Musculoskeletal: no joint swelling, no joint stiffness, no joint pain, no muscle cramps, no muscle pain, no bone pain, no fractures  Integument/Breast: hair loss, but no skin rashes, no skin lesions, no itching, no dry skin, no breast pain, no breast mass, no skin hives, no skin discoloration, no nipple discharge  Neurological: no numbness, no tingling, no weakness, no confusion, no headaches, no dizziness, no fainting, no tremors, no decrease in memory, no balance problems  Psychiatric: no anxiety, no depression, no insomnia, no change in personality, no emotional problems, no stress  Hematologic/Lymphatic: no tendency for easy bleeding, no swollen lymph nodes, no tendency for easy bruising  Immunology: no seasonal allergies, no frequent infections, no frequent illnesses  Endocrine: no temperature intolerance no hirsutism, no hot flashes    OBJECTIVE:  /88   Pulse 104   Ht 6' 6\" (1.981 m)   Wt (!) 385 lb (174.6 kg)   SpO2 96%   BMI 44.49 kg/m²    Wt Readings from Last 3 Encounters:   04/23/19 (!) 385 lb (174.6 kg)   01/14/19 (!) 390 lb (176.9 kg)   08/23/18 (!) 387 lb (175.5 kg)       Constitutional: no acute distress, well appearing and well nourished  Psychiatric: oriented to person, place and time, judgement and insight and normal, recent and remote memory and intact and mood and affect are normal  Skin: skin and subcutaneous tissue is normal without mass, normal turgor  Head and Face: examination of head and face revealed no abnormalities  Eyes: no lid or conjunctival swelling, erythema or discharge, pupils are normal, equal, round, reactive to light  Ears/Nose: external inspection of ears and nose revealed no abnormalities, hearing is grossly normal  Oropharynx/Mouth/Face: lips, tongue and gums are normal with no lesions, the voice quality was normal  Neck: neck is supple and symmetric, with midline trachea and no masses, thyroid is normal  Pulmonary: no increased work of breathing or signs of respiratory distress, lungs are clear to auscultation  Cardiovascular: normal heart rate and rhythm, normal S1 and S2, no murmurs and pedal pulses and 2+ bilaterally, No edema  Musculoskeletal: normal gait and station and exam of the digits and nails are normal  Neurological: normal coordination and normal general cortical function      Lab Results   Component Value Date    LABA1C 7.8 04/22/2019    LABA1C 8.8 01/07/2019    LABA1C 7.5 08/20/2018         ASSESSMENT/PLAN:  TYPE 2 DIABETES WITH COMPLICATIONS - Microalbuminuria --nephropathy a1c 7.5>>8.8>>7.8    LYNNETTE is on U 500 insulin he has been taking 40  units BID via insulin syringe  ---  He still wants to use vials and use the U100 syringe   He was advised to use U 500 syringes   On metformin 1000 twice a day he claims to use it regularly now   On Victoza 1.6 mg daily   Today again we discussed dietary modification and the need to change eating habits   Hypoglycemia protocol reviewed in detail

## 2019-05-06 DIAGNOSIS — E03.9 HYPOTHYROIDISM, UNSPECIFIED TYPE: ICD-10-CM

## 2019-05-06 RX ORDER — LEVOTHYROXINE SODIUM 112 UG/1
TABLET ORAL
Qty: 30 TABLET | Refills: 5 | Status: SHIPPED | OUTPATIENT
Start: 2019-05-06 | End: 2019-10-28 | Stop reason: SDUPTHER

## 2019-06-03 RX ORDER — ICOSAPENT ETHYL 1000 MG/1
CAPSULE ORAL
Qty: 120 CAPSULE | Refills: 3 | Status: SHIPPED | OUTPATIENT
Start: 2019-06-03 | End: 2019-09-25 | Stop reason: SDUPTHER

## 2019-06-28 DIAGNOSIS — E55.9 VITAMIN D DEFICIENCY: ICD-10-CM

## 2019-07-03 RX ORDER — METFORMIN HYDROCHLORIDE 500 MG/1
TABLET, EXTENDED RELEASE ORAL
Qty: 120 TABLET | Refills: 3 | Status: SHIPPED | OUTPATIENT
Start: 2019-07-03 | End: 2019-10-28 | Stop reason: SDUPTHER

## 2019-07-23 DIAGNOSIS — E13.42 DIABETIC POLYNEUROPATHY ASSOCIATED WITH OTHER SPECIFIED DIABETES MELLITUS (HCC): ICD-10-CM

## 2019-07-23 RX ORDER — BLOOD SUGAR DIAGNOSTIC
STRIP MISCELLANEOUS
Qty: 300 EACH | Refills: 5 | Status: SHIPPED | OUTPATIENT
Start: 2019-07-23 | End: 2020-08-14

## 2019-08-01 RX ORDER — ERGOCALCIFEROL 1.25 MG/1
CAPSULE ORAL
Qty: 4 CAPSULE | Refills: 5 | Status: SHIPPED | OUTPATIENT
Start: 2019-08-01 | End: 2020-01-23

## 2019-08-02 ENCOUNTER — HOSPITAL ENCOUNTER (OUTPATIENT)
Age: 34
Discharge: HOME | End: 2019-08-02
Payer: COMMERCIAL

## 2019-08-02 DIAGNOSIS — E66.01 MORBID OBESITY (HCC): ICD-10-CM

## 2019-08-02 DIAGNOSIS — E55.9 VITAMIN D DEFICIENCY: ICD-10-CM

## 2019-08-02 LAB
24HR URINE VOLUME (ML): 4250 ML
A/G RATIO: 1.5 (ref 1.1–2.2)
ALBUMIN SERPL-MCNC: 4.3 G/DL (ref 3.4–5)
ALP BLD-CCNC: 48 U/L (ref 40–129)
ALT SERPL-CCNC: 53 U/L (ref 10–40)
ANION GAP SERPL CALCULATED.3IONS-SCNC: 14 MMOL/L (ref 3–16)
AST SERPL-CCNC: 27 U/L (ref 15–37)
BILIRUB SERPL-MCNC: 0.3 MG/DL (ref 0–1)
BUN BLDV-MCNC: 10 MG/DL (ref 7–20)
CALCIUM SERPL-MCNC: 9.3 MG/DL (ref 8.3–10.6)
CHLORIDE BLD-SCNC: 101 MMOL/L (ref 99–110)
CHOLESTEROL, TOTAL: 163 MG/DL (ref 0–199)
CO2: 24 MMOL/L (ref 21–32)
CREAT SERPL-MCNC: 0.9 MG/DL (ref 0.9–1.3)
CREATININE 24 HOUR URINE: 3.2 G/24HR (ref 0.6–2.5)
CREATININE URINE: 234.6 MG/DL (ref 39–259)
GFR AFRICAN AMERICAN: >60
GFR NON-AFRICAN AMERICAN: >60
GLOBULIN: 2.9 G/DL
GLUCOSE BLD-MCNC: 144 MG/DL (ref 70–99)
HDLC SERPL-MCNC: 30 MG/DL (ref 40–60)
LDL CHOLESTEROL CALCULATED: 81 MG/DL
MICROALBUMIN UR-MCNC: 11.6 MG/DL
MICROALBUMIN/CREAT UR-RTO: 49.4 MG/G (ref 0–30)
POTASSIUM SERPL-SCNC: 4.1 MMOL/L (ref 3.5–5.1)
SODIUM BLD-SCNC: 139 MMOL/L (ref 136–145)
TOTAL PROTEIN: 7.2 G/DL (ref 6.4–8.2)
TRIGL SERPL-MCNC: 260 MG/DL (ref 0–150)
TSH SERPL DL<=0.05 MIU/L-ACNC: 2.49 UIU/ML (ref 0.27–4.2)
VITAMIN D 25-HYDROXY: 36.5 NG/ML
VLDLC SERPL CALC-MCNC: 52 MG/DL

## 2019-08-02 PROCEDURE — 82306 VITAMIN D 25 HYDROXY: CPT

## 2019-08-02 PROCEDURE — 36415 COLL VENOUS BLD VENIPUNCTURE: CPT

## 2019-08-02 PROCEDURE — 83036 HEMOGLOBIN GLYCOSYLATED A1C: CPT

## 2019-08-02 PROCEDURE — 80053 COMPREHEN METABOLIC PANEL: CPT

## 2019-08-02 PROCEDURE — 80061 LIPID PANEL: CPT

## 2019-08-02 PROCEDURE — 84443 ASSAY THYROID STIM HORMONE: CPT

## 2019-08-02 PROCEDURE — 82530 CORTISOL FREE: CPT

## 2019-08-02 PROCEDURE — 82570 ASSAY OF URINE CREATININE: CPT

## 2019-08-02 PROCEDURE — 82043 UR ALBUMIN QUANTITATIVE: CPT

## 2019-08-03 LAB
ESTIMATED AVERAGE GLUCOSE: 188.6 MG/DL
HBA1C MFR BLD: 8.2 %

## 2019-08-06 LAB
CORTISOL (UR), FREE: 6.5 UG/D
CORTISOL URINE, FREE (/G CRT): 2.11 UG/G CRT
CORTISOL,F,UG/L,U: 1.52 UG/L
CREATININE 24 HOUR URINE: 3060 MG/D (ref 1000–2500)
CREATININE URINE: 72 MG/DL
HOURS COLLECTED: 24 HR
INTERPRETATION: ABNORMAL
URINE TOTAL VOLUME: 4250 ML

## 2019-08-08 ENCOUNTER — OFFICE VISIT (OUTPATIENT)
Dept: ENDOCRINOLOGY | Age: 34
End: 2019-08-08
Payer: COMMERCIAL

## 2019-08-08 VITALS
HEART RATE: 116 BPM | OXYGEN SATURATION: 99 % | WEIGHT: 315 LBS | SYSTOLIC BLOOD PRESSURE: 112 MMHG | BODY MASS INDEX: 44.14 KG/M2 | DIASTOLIC BLOOD PRESSURE: 84 MMHG

## 2019-08-08 DIAGNOSIS — E78.2 MIXED HYPERLIPIDEMIA: ICD-10-CM

## 2019-08-08 DIAGNOSIS — E55.9 VITAMIN D DEFICIENCY: ICD-10-CM

## 2019-08-08 DIAGNOSIS — E88.81 INSULIN RESISTANCE: ICD-10-CM

## 2019-08-08 DIAGNOSIS — N18.2 CKD (CHRONIC KIDNEY DISEASE) STAGE 2, GFR 60-89 ML/MIN: ICD-10-CM

## 2019-08-08 DIAGNOSIS — F90.0 ATTENTION DEFICIT HYPERACTIVITY DISORDER (ADHD), PREDOMINANTLY INATTENTIVE TYPE: ICD-10-CM

## 2019-08-08 DIAGNOSIS — E11.42 TYPE 2 DIABETES MELLITUS WITH DIABETIC POLYNEUROPATHY, WITH LONG-TERM CURRENT USE OF INSULIN (HCC): Primary | ICD-10-CM

## 2019-08-08 DIAGNOSIS — E06.3 HYPOTHYROIDISM DUE TO HASHIMOTO'S THYROIDITIS: ICD-10-CM

## 2019-08-08 DIAGNOSIS — I10 ESSENTIAL HYPERTENSION: ICD-10-CM

## 2019-08-08 DIAGNOSIS — E03.8 HYPOTHYROIDISM DUE TO HASHIMOTO'S THYROIDITIS: ICD-10-CM

## 2019-08-08 DIAGNOSIS — Z79.4 TYPE 2 DIABETES MELLITUS WITH DIABETIC POLYNEUROPATHY, WITH LONG-TERM CURRENT USE OF INSULIN (HCC): Primary | ICD-10-CM

## 2019-08-08 PROCEDURE — 99214 OFFICE O/P EST MOD 30 MIN: CPT | Performed by: INTERNAL MEDICINE

## 2019-09-26 RX ORDER — ICOSAPENT ETHYL 1000 MG/1
CAPSULE ORAL
Qty: 120 CAPSULE | Refills: 3 | Status: SHIPPED | OUTPATIENT
Start: 2019-09-26 | End: 2020-01-23

## 2019-10-28 DIAGNOSIS — E03.9 HYPOTHYROIDISM, UNSPECIFIED TYPE: ICD-10-CM

## 2019-10-28 RX ORDER — METFORMIN HYDROCHLORIDE 500 MG/1
TABLET, EXTENDED RELEASE ORAL
Qty: 120 TABLET | Refills: 3 | Status: SHIPPED | OUTPATIENT
Start: 2019-10-28 | End: 2020-01-23

## 2019-10-28 RX ORDER — LEVOTHYROXINE SODIUM 112 UG/1
TABLET ORAL
Qty: 30 TABLET | Refills: 5 | Status: SHIPPED | OUTPATIENT
Start: 2019-10-28 | End: 2020-01-29

## 2019-12-27 ENCOUNTER — TELEPHONE (OUTPATIENT)
Dept: ENDOCRINOLOGY | Age: 34
End: 2019-12-27

## 2020-01-23 RX ORDER — ICOSAPENT ETHYL 1000 MG/1
CAPSULE ORAL
Qty: 120 CAPSULE | Refills: 3 | Status: SHIPPED | OUTPATIENT
Start: 2020-01-23 | End: 2020-05-21

## 2020-01-23 RX ORDER — METFORMIN HYDROCHLORIDE 500 MG/1
TABLET, EXTENDED RELEASE ORAL
Qty: 120 TABLET | Refills: 2 | Status: SHIPPED | OUTPATIENT
Start: 2020-01-23 | End: 2020-04-21

## 2020-01-23 RX ORDER — ERGOCALCIFEROL 1.25 MG/1
CAPSULE ORAL
Qty: 4 CAPSULE | Refills: 5 | Status: SHIPPED | OUTPATIENT
Start: 2020-01-23 | End: 2021-01-20

## 2020-01-24 ENCOUNTER — HOSPITAL ENCOUNTER (OUTPATIENT)
Age: 35
Discharge: HOME | End: 2020-01-24
Payer: COMMERCIAL

## 2020-01-24 LAB
A/G RATIO: 1.4 (ref 1.1–2.2)
ALBUMIN SERPL-MCNC: 4.6 G/DL (ref 3.4–5)
ALP BLD-CCNC: 56 U/L (ref 40–129)
ALT SERPL-CCNC: 67 U/L (ref 10–40)
ANION GAP SERPL CALCULATED.3IONS-SCNC: 12 MMOL/L (ref 3–16)
AST SERPL-CCNC: 38 U/L (ref 15–37)
BILIRUB SERPL-MCNC: 0.3 MG/DL (ref 0–1)
BUN BLDV-MCNC: 12 MG/DL (ref 7–20)
CALCIUM SERPL-MCNC: 10 MG/DL (ref 8.3–10.6)
CHLORIDE BLD-SCNC: 94 MMOL/L (ref 99–110)
CHOLESTEROL, TOTAL: 193 MG/DL (ref 0–199)
CO2: 31 MMOL/L (ref 21–32)
CREAT SERPL-MCNC: 1.2 MG/DL (ref 0.9–1.3)
CREATININE URINE: 263 MG/DL (ref 39–259)
ESTIMATED AVERAGE GLUCOSE: 205.9 MG/DL
GFR AFRICAN AMERICAN: >60
GFR NON-AFRICAN AMERICAN: >60
GLOBULIN: 3.4 G/DL
GLUCOSE BLD-MCNC: 227 MG/DL (ref 70–99)
HBA1C MFR BLD: 8.8 %
HDLC SERPL-MCNC: 33 MG/DL (ref 40–60)
LDL CHOLESTEROL CALCULATED: ABNORMAL MG/DL
LDL CHOLESTEROL DIRECT: 119 MG/DL
MICROALBUMIN UR-MCNC: 29.4 MG/DL
MICROALBUMIN/CREAT UR-RTO: 111.8 MG/G (ref 0–30)
POTASSIUM SERPL-SCNC: 4.8 MMOL/L (ref 3.5–5.1)
SODIUM BLD-SCNC: 137 MMOL/L (ref 136–145)
TOTAL PROTEIN: 8 G/DL (ref 6.4–8.2)
TRIGL SERPL-MCNC: 363 MG/DL (ref 0–150)
TSH SERPL DL<=0.05 MIU/L-ACNC: 4.44 UIU/ML (ref 0.27–4.2)
VITAMIN D 25-HYDROXY: 31.5 NG/ML
VLDLC SERPL CALC-MCNC: ABNORMAL MG/DL

## 2020-01-24 PROCEDURE — 80053 COMPREHEN METABOLIC PANEL: CPT

## 2020-01-24 PROCEDURE — 82570 ASSAY OF URINE CREATININE: CPT

## 2020-01-24 PROCEDURE — 83036 HEMOGLOBIN GLYCOSYLATED A1C: CPT

## 2020-01-24 PROCEDURE — 82043 UR ALBUMIN QUANTITATIVE: CPT

## 2020-01-24 PROCEDURE — 36415 COLL VENOUS BLD VENIPUNCTURE: CPT

## 2020-01-24 PROCEDURE — 82306 VITAMIN D 25 HYDROXY: CPT

## 2020-01-24 PROCEDURE — 84443 ASSAY THYROID STIM HORMONE: CPT

## 2020-01-24 PROCEDURE — 80061 LIPID PANEL: CPT

## 2020-01-29 ENCOUNTER — OFFICE VISIT (OUTPATIENT)
Dept: ENDOCRINOLOGY | Age: 35
End: 2020-01-29
Payer: COMMERCIAL

## 2020-01-29 VITALS
HEART RATE: 121 BPM | BODY MASS INDEX: 36.45 KG/M2 | WEIGHT: 315 LBS | OXYGEN SATURATION: 98 % | SYSTOLIC BLOOD PRESSURE: 100 MMHG | HEIGHT: 78 IN | DIASTOLIC BLOOD PRESSURE: 84 MMHG

## 2020-01-29 PROCEDURE — 99214 OFFICE O/P EST MOD 30 MIN: CPT | Performed by: INTERNAL MEDICINE

## 2020-01-29 RX ORDER — LEVOTHYROXINE SODIUM 0.12 MG/1
125 TABLET ORAL DAILY
Qty: 90 TABLET | Refills: 5 | Status: SHIPPED | OUTPATIENT
Start: 2020-01-29 | End: 2020-04-21

## 2020-01-29 NOTE — PROGRESS NOTES
following a high fat/cholesterol diet. When asked about meal planning, he reported none. He has not had a previous visit with a dietitian. He rarely participates in exercise. There is no change in his home blood glucose trend. His breakfast blood glucose is taken after 10 am. His breakfast blood glucose range is generally 110-130 mg/dl. An ACE inhibitor/angiotensin II receptor blocker is being taken. He sees a podiatrist.Eye exam is current. Denies any hypoglycemia, tends to check his fingersticks at least once to twice daily he was offered to have a continuous glucose sensor especially when he is on a concentrated insulin he would like to stick with the current glucometer but agrees to check it more often  Weight trend: has been gaining weight aproximately 15 lbs since summer     Prior visit with dietician: yes  Current diet: on average, 3 meals per day  Current exercise: rare    Has there been any hospitalization, surgery or major illness since the last visit? No   Has there been any new school, family or social problems since the last visit? No  Has there been any new family history of members with diabetes, heart disease, stroke, or endocrine related problems since the last visit?   No    Past Medical History:   Diagnosis Date    ADHD (attention deficit hyperactivity disorder)     Autism     Chicken pox     CKD (chronic kidney disease)     Depression     Down syndrome     Flu 03/06/2019    Hx: UTI (urinary tract infection)     Hyperlipidemia     Hyperlipidemia 8/23/2018    Hypertension     Hypothyroidism     Insulin resistance 2/5/2018    Microalbuminuria     Obesity     Type 2 diabetes mellitus with diabetic polyneuropathy, with long-term current use of insulin ([facility] [address] 2/5/2018    Type 2 diabetes mellitus without complication (HCC)     Vitamin D deficiency       Patient Active Problem List   Diagnosis    Allergic rhinitis    Allergy to mold spores    Attention deficit hyperactivity disorder (ADHD)    CKD (chronic kidney disease) stage 2, GFR 60-89 ml/min    Down syndrome    Essential hypertension    Hypothyroidism    Obesity    Other and unspecified hyperlipidemia    Pes cavus    Vitamin D deficiency    Type 2 diabetes mellitus with diabetic polyneuropathy, with long-term current use of insulin (HCC)    Insulin resistance    Hyperlipidemia     Past Surgical History:   Procedure Laterality Date    APPENDECTOMY      TYMPANOSTOMY TUBE PLACEMENT       Social History     Socioeconomic History    Marital status: Single     Spouse name: Not on file    Number of children: Not on file    Years of education: Not on file    Highest education level: Not on file   Occupational History    Not on file   Social Needs    Financial resource strain: Not on file    Food insecurity:     Worry: Not on file     Inability: Not on file    Transportation needs:     Medical: Not on file     Non-medical: Not on file   Tobacco Use    Smoking status: Never Smoker    Smokeless tobacco: Never Used   Substance and Sexual Activity    Alcohol use: No    Drug use: No    Sexual activity: Not on file   Lifestyle    Physical activity:     Days per week: Not on file     Minutes per session: Not on file    Stress: Not on file   Relationships    Social connections:     Talks on phone: Not on file     Gets together: Not on file     Attends Druze service: Not on file     Active member of club or organization: Not on file     Attends meetings of clubs or organizations: Not on file     Relationship status: Not on file    Intimate partner violence:     Fear of current or ex partner: Not on file     Emotionally abused: Not on file     Physically abused: Not on file     Forced sexual activity: Not on file   Other Topics Concern    Not on file   Social History Narrative    Not on file     Family History   Problem Relation Age of Onset    Diabetes Mother     High Blood Pressure Mother     High Blood Pressure Father     Other Maternal Grandmother         lymphoma    Heart Disease Maternal Grandfather      Current Outpatient Medications   Medication Sig Dispense Refill    Glucagon (BAQSIMI ONE PACK) 3 MG/DOSE POWD To be used in case of severe hypoglycemia 2 each 3    levothyroxine (SYNTHROID) 125 MCG tablet Take 1 tablet by mouth daily 90 tablet 5    metFORMIN (GLUCOPHAGE-XR) 500 MG extended release tablet 2 TABLETS 2 TIMES A DAY (PRESCRIBER OK IS NEEDED FOR NEXT FILL) 120 tablet 2    vitamin D (ERGOCALCIFEROL) 1.25 MG (62882 UT) CAPS capsule TAKE 1 CAPSULE (50,000 UNITS TOTAL) BY MOUTH ONCE A WEEK. (PRESCRIBER OK IS NEEDED FOR NEXT FILL) 4 capsule 5    VASCEPA 1 g CAPS capsule 2 CAPSULES BY MOUTH 2 TIMES A DAY (PRESCRIBER OK IS NEEDED FOR NEXT FILL) 120 capsule 3    insulin regular human (HUMULIN R) 500 UNIT/ML concentrated injection vial INJECT 175 UNITS(DRAW UP TO 35 UNIT LINE ON U-100 SYRINGE) THREE TIMES A DAY (PRESCRIBER OK NEEDED FOR NEXT REFILL) 20 mL 5    liraglutide-weight management 18 MG/3ML SOPN 3 mg daily 9 pen 3    Insulin Syringe-Needle U-100 (TRUEPLUS INSULIN SYRINGE) 31G X 5/16\" 0.5 ML MISC USE WITH INSULIN THREE TIMES A DAY **MAY REFILL** [email or web address] 300 each 5    blood glucose test strips (FREESTYLE LITE) strip USE TO CHECK BLOOD SUGAR 6 TIMES DAILY 600 strip 5    lisinopril-hydrochlorothiazide (PRINZIDE;ZESTORETIC) 10-12.5 MG per tablet 1 TABLET ONCE DAILY (NEW RX OR MD NEEDS TO BE CONTACTED FOR MORE REFILLS) 30 tablet 5    glucagon 1 MG injection Inject 1 mg into the skin See Admin Instructions Follow package directions for low blood sugar.  1 kit 3    glucagon (GLUCAGON EMERGENCY) 1 MG injection USE IN CASE OF SEVERE HYCPOGLYCEMIA      Lancets MISC Use 4 times a day      traZODone (DESYREL) 100 MG tablet TAKE 1 TO 2 TABLETS BY MOUTH AT BEDTIME **MAY REFILL** **INCREASE IN DIRECTIONS**      venlafaxine (EFFEXOR XR) 150 MG extended release capsule Take 150 mg by Date    LABA1C 8.8 01/24/2020    LABA1C 8.2 08/02/2019    LABA1C 7.8 04/22/2019         ASSESSMENT/PLAN:      TYPE 2 DIABETES WITH COMPLICATIONS - Microalbuminuria --nephropathy a1c 7.5>>8.8>>7.8>>8.2>>8.8    LYNNETTE is on U 500 insulin he has been taking 50  units BID via insulin syringe  --- he will increase the morning dose of U500   --Discussed freestyle thiago again he is not interested  He still wants to use vials and use the U100 syringe   He was advised to use U 500 syringes   On metformin 1000 twice a day he claims to use it regularly now   On Victoza 1.8  mg daily   Today again we discussed dietary modification and the need to change eating habits   Hypoglycemia protocol reviewed in detail with patient Patient was advised to carry glucose tablets and also have glucagon emergency kit. Provided with RX for glucagon. Patient was advised that sending of his fingerstick blood glucose logs is crucial in management of his diabetes. We will adjust his dose of insulin according to sent data.   -advised to have annual dilated eye exam 2018 no retinopathy   - microalb/creat ratio was elevated in  april 2019  --saw Dr Enid Mora in the past    -foot exam performed  follows with Podiatry Dr Silke Hinton every 3 month ---  -for his buffalo hump I did MSCx 2 which came back abnl I am not positive if he even did at the right time saw her in jan 2018 .  -1 mg dex suppression test was normal cortisol was <0.1.   --- repeat 24 hr urine cortisol in August 2019 was again normal      Hypothyroidism   On 112 mcg daily tsh 4, will increase the dose to 125 mcg of levothyroxine daily  --Patient has been taking his medication regularly he denies any use of iron or calcium with his thyroid medication so we will increase the dose to 125 mcg daily and recheck it in 2-3months  Pt appears clinically and biochemically euthyroid on current dose of Levothyroxine , will continue on same dose.   Pt was advised to avoid taking Levothyroxine with concomitant Iron and calcium containing supplements and try taking 1/2 hr before eating.    --thyroid uls was normal 8/2014   his Abs were negative. Elevated LFTS now getting better   usg showed fatty liver   Discussed the need to follow up with PCP   Discussed weight loss in detail     Autism   stable     Hypertension   He is on lisinopril /hct     Hypertriglyceridemia   He has been taking Lovaza 4 gram daily and have repeat labs in 3 months   LDL 85>>62 -Tgs 289>>330  He has  been taking  Statins  regularly     VIT D DEF   levels improved from 5 ----24 >>26   Advised to take 5000 iu dialy   advised to increase sun exposure     OBesity   Advised to step up his diet and exercise. He lost 20 lbs in summer 2016   I had a lengthy discussion with the patient regarding caloric restriction as well as stepping up exercise. We discussed caloric goal in detail. Also discussed with patient the utility of mobile phone apps like \" my fitness Pal \" or \"Lose It \". Patient verbalized understanding and agrees to work on this aspect. he has no stigma of  Cushing disease or untreated thyroid disorder. Previous workup was negative     Depression   On effexor and welbutrin as per PCP            Reviewed and/or ordered clinical lab results Yes  Reviewed and/or ordered radiology tests Yes  Reviewed and/or ordered other diagnostic tests Yes  Made a decision to obtain old records Yes  Reviewed and summarized old records Yes      Amber Burton was counseled regarding symptoms of current diagnosis, course and complications of disease if inadequately treated, side effects of medications, diagnosis, treatment options, and prognosis, risks, benefits, complications, and alternatives of treatment, labs, imaging and other studies and treatment targets and goals. He understands instructions and counseling. These diagnosis were discussed and reviewed with the patient including the advantages of drug therapy.  He was counseled at this visit on the

## 2020-01-30 ENCOUNTER — TELEPHONE (OUTPATIENT)
Dept: ENDOCRINOLOGY | Age: 35
End: 2020-01-30

## 2020-01-30 NOTE — TELEPHONE ENCOUNTER
Fax from [facility]. Pt has been provided a temporary supply of Baqsimi 2 pow 3mg dose.  This drug is not on our list of covered drugs

## 2020-04-21 RX ORDER — METFORMIN HYDROCHLORIDE 500 MG/1
TABLET, EXTENDED RELEASE ORAL
Qty: 120 TABLET | Refills: 2 | Status: SHIPPED | OUTPATIENT
Start: 2020-04-21 | End: 2020-07-17

## 2020-04-21 RX ORDER — LEVOTHYROXINE SODIUM 112 UG/1
TABLET ORAL
Qty: 30 TABLET | Refills: 5 | Status: SHIPPED | OUTPATIENT
Start: 2020-04-21 | End: 2020-10-13

## 2020-05-05 ENCOUNTER — VIRTUAL VISIT (OUTPATIENT)
Dept: ENDOCRINOLOGY | Age: 35
End: 2020-05-05
Payer: COMMERCIAL

## 2020-05-05 PROCEDURE — 99214 OFFICE O/P EST MOD 30 MIN: CPT | Performed by: INTERNAL MEDICINE

## 2020-05-05 RX ORDER — BLOOD-GLUCOSE METER
KIT MISCELLANEOUS
Qty: 600 STRIP | Refills: 5 | Status: SHIPPED | OUTPATIENT
Start: 2020-05-05 | End: 2021-01-20 | Stop reason: SDUPTHER

## 2020-05-05 NOTE — PROGRESS NOTES
following a high fat/cholesterol diet. When asked about meal planning, he reported none. He has not had a previous visit with a dietitian. He rarely participates in exercise. There is no change in his home blood glucose trend. His breakfast blood glucose is taken after 10 am. His breakfast blood glucose range is generally 110-130 mg/dl. An ACE inhibitor/angiotensin II receptor blocker is being taken. He sees a podiatrist.Eye exam is current. Denies any hypoglycemia, , according to mom he has gained weight and is struggling with fungal infection in his groin , he has tried Diflucan   He has been struggling with genital yeast infection and has been referred to urologist   He has taken to     Prior visit with dietician: yes  Current diet: on average, 3 meals per day  Current exercise: rare    Has there been any hospitalization, surgery or major illness since the last visit? No   Has there been any new school, family or social problems since the last visit? No  Has there been any new family history of members with diabetes, heart disease, stroke, or endocrine related problems since the last visit?   No    Past Medical History:   Diagnosis Date    ADHD (attention deficit hyperactivity disorder)     Autism     Chicken pox     CKD (chronic kidney disease)     Depression     Down syndrome     Flu 03/06/2019    Hx: UTI (urinary tract infection)     Hyperlipidemia     Hyperlipidemia 8/23/2018    Hypertension     Hypothyroidism     Insulin resistance 2/5/2018    Microalbuminuria     Obesity     Type 2 diabetes mellitus with diabetic polyneuropathy, with long-term current use of insulin ([facility] [address] 2/5/2018    Type 2 diabetes mellitus without complication (HCC)     Vitamin D deficiency       Patient Active Problem List   Diagnosis    Allergic rhinitis    Allergy to mold spores    Attention deficit hyperactivity disorder (ADHD)    CKD (chronic kidney disease) stage 2, GFR 60-89 ml/min    Down syndrome    Amoxicillin      Bladder infection           OBJECTIVE:  There were no vitals taken for this visit. Wt Readings from Last 3 Encounters:   01/29/20 (!) 385 lb (174.6 kg)   08/08/19 (!) 382 lb (173.3 kg)   04/23/19 (!) 385 lb (174.6 kg)           Lab Results   Component Value Date    LABA1C 8.8 01/24/2020    LABA1C 8.2 08/02/2019    LABA1C 7.8 04/22/2019         ASSESSMENT/PLAN:      TYPE 2 DIABETES WITH COMPLICATIONS - Microalbuminuria --nephropathy a1c 7.5>>8.8>>7.8>>8.2>>8.8    LYNNETTE is on U 500 insulin he has been taking 50  units BID via insulin syringe  --- he will increase the morning dose of U500 as having some highs during the day   --Discussed freestyle thiago again he is not interested  He still wants to use vials and use the U100 syringe   He was advised to use U 500 syringes   On metformin 1000 twice a day   On Victoza 1.8  mg daily   Today again we discussed dietary modification and the need to change eating habits   Hypoglycemia protocol reviewed in detail with patient Patient was advised to carry glucose tablets and also have glucagon emergency kit. Provided with RX for glucagon. Patient was advised that sending of his fingerstick blood glucose logs is crucial in management of his diabetes. We will adjust his dose of insulin according to sent data.      Health maintenance   -advised to have annual dilated eye exam 2018 no retinopathy   - microalb/creat ratio was elevated in  april 2019  --saw Dr Khalida Mehta in the past    -foot exam performed  follows with Podiatry Dr Zohreh Troy every 3 month ---      -for his buffalo hump I did MSCx 2 which came back abnl I am not positive if he even did at the right time saw her in jan 2018 .  -1 mg dex suppression test was normal cortisol was <0.1.   --- repeat 24 hr urine cortisol in August 2019 was again normal    ---Fungal Groin infection gave them new referral for  urology as mom is not happy with Dr at the urology group     Hypothyroidism   On 125 mcg daily tsh 4, will increase the dose to 125 mcg of levothyroxine daily  --Patient has been taking his medication regularly he denies any use of iron or calcium with his thyroid medication   Pt appears clinically and biochemically euthyroid on current dose of Levothyroxine , will continue on same dose. Pt was advised to avoid taking Levothyroxine with concomitant Iron and calcium containing supplements and try taking 1/2 hr before eating.    --thyroid uls was normal 8/2014   his Abs were negative. Elevated LFTS now getting better   usg showed fatty liver   Discussed the need to follow up with PCP   Discussed weight loss in detail     Autism   stable     Hypertension   He is on lisinopril /hct   Not checked recently but denies any headaches or chestpain     Hypertriglyceridemia   He has been taking Lovaza 4 gram daily and have repeat labs in 3 months   LDL 85>>62 -Tgs 289>>330  He has  been taking  Statins  regularly     VIT D DEF   levels improved from 5 ----24 >>26   Advised to take 5000 iu dialy   advised to increase sun exposure     OBesity   Advised to step up his diet and exercise. He lost 20 lbs in summer 2016   I had a lengthy discussion with the patient regarding caloric restriction as well as stepping up exercise. We discussed caloric goal in detail. Also discussed with patient the utility of mobile phone apps like \" my fitness Pal \" or \"Lose It \". Patient verbalized understanding and agrees to work on this aspect. he has no stigma of  Cushing disease or untreated thyroid disorder.    Previous workup was negative     Depression   On effexor and welbutrin as per PCP            Reviewed and/or ordered clinical lab results Yes  Reviewed and/or ordered radiology tests Yes  Reviewed and/or ordered other diagnostic tests Yes  Made a decision to obtain old records Yes  Reviewed and summarized old records Yes      Africa Aviles was counseled regarding symptoms of current diagnosis, course and complications of disease if

## 2020-05-05 NOTE — Clinical Note
Please schedule for a follow up in 3 months and mail patient  lab orders  As well as referral for urology that I have placed

## 2020-05-21 RX ORDER — ICOSAPENT ETHYL 1000 MG/1
CAPSULE ORAL
Qty: 120 CAPSULE | Refills: 3 | Status: SHIPPED | OUTPATIENT
Start: 2020-05-21 | End: 2020-09-14

## 2020-05-21 RX ORDER — LIRAGLUTIDE 6 MG/ML
INJECTION SUBCUTANEOUS
Qty: 27 ML | Refills: 3 | Status: SHIPPED | OUTPATIENT
Start: 2020-05-21 | End: 2021-04-14 | Stop reason: SDUPTHER

## 2020-06-22 RX ORDER — INSULIN HUMAN 500 [IU]/ML
INJECTION, SOLUTION SUBCUTANEOUS
Qty: 20 ML | Refills: 5 | Status: SHIPPED | OUTPATIENT
Start: 2020-06-22 | End: 2020-10-22

## 2020-07-17 RX ORDER — METFORMIN HYDROCHLORIDE 500 MG/1
TABLET, EXTENDED RELEASE ORAL
Qty: 120 TABLET | Refills: 2 | Status: SHIPPED | OUTPATIENT
Start: 2020-07-17 | End: 2020-10-13

## 2020-10-13 RX ORDER — METFORMIN HYDROCHLORIDE 500 MG/1
TABLET, EXTENDED RELEASE ORAL
Qty: 120 TABLET | Refills: 2 | Status: SHIPPED | OUTPATIENT
Start: 2020-10-13 | End: 2021-01-14

## 2020-10-13 NOTE — TELEPHONE ENCOUNTER
Medication:   Requested Prescriptions     Pending Prescriptions Disp Refills    metFORMIN (GLUCOPHAGE-XR) 500 MG extended release tablet [Pharmacy Med Name: METFORMIN ER 500MG TAB TAGI 500 Tablet] 120 tablet 2     Si TABLETS 2 TIMES A DAY (PRESCRIBER OK NEEDED FOR NEXT REFILL)         Last appt: 2020  Next appt: Visit date not found    Last Labs DM:   Lab Results   Component Value Date    LABA1C 8.8 2020

## 2020-11-11 ENCOUNTER — TELEPHONE (OUTPATIENT)
Dept: ENDOCRINOLOGY | Age: 35
End: 2020-11-11

## 2021-01-19 ENCOUNTER — HOSPITAL ENCOUNTER (OUTPATIENT)
Age: 36
Discharge: HOME | End: 2021-01-19
Payer: MEDICARE

## 2021-01-19 DIAGNOSIS — Z79.4 TYPE 2 DIABETES MELLITUS WITH DIABETIC POLYNEUROPATHY, WITH LONG-TERM CURRENT USE OF INSULIN (HCC): ICD-10-CM

## 2021-01-19 DIAGNOSIS — E11.42 TYPE 2 DIABETES MELLITUS WITH DIABETIC POLYNEUROPATHY, WITH LONG-TERM CURRENT USE OF INSULIN (HCC): ICD-10-CM

## 2021-01-19 DIAGNOSIS — E06.3 HYPOTHYROIDISM DUE TO HASHIMOTO'S THYROIDITIS: ICD-10-CM

## 2021-01-19 DIAGNOSIS — E55.9 VITAMIN D DEFICIENCY: ICD-10-CM

## 2021-01-19 DIAGNOSIS — E03.8 HYPOTHYROIDISM DUE TO HASHIMOTO'S THYROIDITIS: ICD-10-CM

## 2021-01-19 LAB
A/G RATIO: 1.5 (ref 1.1–2.2)
ALBUMIN SERPL-MCNC: 4.6 G/DL (ref 3.4–5)
ALP BLD-CCNC: 60 U/L (ref 40–129)
ALT SERPL-CCNC: 60 U/L (ref 10–40)
ANION GAP SERPL CALCULATED.3IONS-SCNC: 12 MMOL/L (ref 3–16)
AST SERPL-CCNC: 38 U/L (ref 15–37)
BILIRUB SERPL-MCNC: 0.4 MG/DL (ref 0–1)
BUN BLDV-MCNC: 13 MG/DL (ref 7–20)
CALCIUM SERPL-MCNC: 9.4 MG/DL (ref 8.3–10.6)
CHLORIDE BLD-SCNC: 99 MMOL/L (ref 99–110)
CHOLESTEROL, TOTAL: 166 MG/DL (ref 0–199)
CO2: 28 MMOL/L (ref 21–32)
CREAT SERPL-MCNC: 1.1 MG/DL (ref 0.9–1.3)
CREATININE URINE: 133.4 MG/DL (ref 39–259)
GFR AFRICAN AMERICAN: >60
GFR NON-AFRICAN AMERICAN: >60
GLOBULIN: 3 G/DL
GLUCOSE BLD-MCNC: 222 MG/DL (ref 70–99)
HDLC SERPL-MCNC: 34 MG/DL (ref 40–60)
LDL CHOLESTEROL CALCULATED: 92 MG/DL
MICROALBUMIN UR-MCNC: 16.7 MG/DL
MICROALBUMIN/CREAT UR-RTO: 125.2 MG/G (ref 0–30)
POTASSIUM SERPL-SCNC: 4.6 MMOL/L (ref 3.5–5.1)
SODIUM BLD-SCNC: 139 MMOL/L (ref 136–145)
TOTAL PROTEIN: 7.6 G/DL (ref 6.4–8.2)
TRIGL SERPL-MCNC: 199 MG/DL (ref 0–150)
TSH SERPL DL<=0.05 MIU/L-ACNC: 4.6 UIU/ML (ref 0.27–4.2)
VLDLC SERPL CALC-MCNC: 40 MG/DL

## 2021-01-19 PROCEDURE — 80053 COMPREHEN METABOLIC PANEL: CPT

## 2021-01-19 PROCEDURE — 80061 LIPID PANEL: CPT

## 2021-01-19 PROCEDURE — 82043 UR ALBUMIN QUANTITATIVE: CPT

## 2021-01-19 PROCEDURE — 83036 HEMOGLOBIN GLYCOSYLATED A1C: CPT

## 2021-01-19 PROCEDURE — 84443 ASSAY THYROID STIM HORMONE: CPT

## 2021-01-19 PROCEDURE — 82570 ASSAY OF URINE CREATININE: CPT

## 2021-01-19 PROCEDURE — 82306 VITAMIN D 25 HYDROXY: CPT

## 2021-01-20 ENCOUNTER — OFFICE VISIT (OUTPATIENT)
Dept: ENDOCRINOLOGY | Age: 36
End: 2021-01-20
Payer: COMMERCIAL

## 2021-01-20 VITALS
TEMPERATURE: 97 F | SYSTOLIC BLOOD PRESSURE: 131 MMHG | RESPIRATION RATE: 16 BRPM | HEART RATE: 105 BPM | HEIGHT: 78 IN | BODY MASS INDEX: 43.37 KG/M2 | DIASTOLIC BLOOD PRESSURE: 76 MMHG

## 2021-01-20 DIAGNOSIS — E55.9 VITAMIN D DEFICIENCY: ICD-10-CM

## 2021-01-20 DIAGNOSIS — Z79.4 TYPE 2 DIABETES MELLITUS WITH DIABETIC POLYNEUROPATHY, WITH LONG-TERM CURRENT USE OF INSULIN (HCC): Primary | ICD-10-CM

## 2021-01-20 DIAGNOSIS — I10 ESSENTIAL HYPERTENSION: ICD-10-CM

## 2021-01-20 DIAGNOSIS — E11.42 TYPE 2 DIABETES MELLITUS WITH DIABETIC POLYNEUROPATHY, WITH LONG-TERM CURRENT USE OF INSULIN (HCC): Primary | ICD-10-CM

## 2021-01-20 DIAGNOSIS — E78.2 MIXED HYPERLIPIDEMIA: ICD-10-CM

## 2021-01-20 LAB
ESTIMATED AVERAGE GLUCOSE: 185.8 MG/DL
HBA1C MFR BLD: 8.1 %
VITAMIN D 25-HYDROXY: 28.4 NG/ML

## 2021-01-20 PROCEDURE — 99214 OFFICE O/P EST MOD 30 MIN: CPT | Performed by: INTERNAL MEDICINE

## 2021-01-20 PROCEDURE — 3052F HG A1C>EQUAL 8.0%<EQUAL 9.0%: CPT | Performed by: INTERNAL MEDICINE

## 2021-01-20 RX ORDER — LEVOTHYROXINE SODIUM 0.12 MG/1
125 TABLET ORAL DAILY
Qty: 90 TABLET | Refills: 1 | Status: SHIPPED | OUTPATIENT
Start: 2021-01-20 | End: 2021-07-27

## 2021-01-20 RX ORDER — ERGOCALCIFEROL 1.25 MG/1
CAPSULE ORAL
Qty: 4 CAPSULE | Refills: 5 | Status: SHIPPED | OUTPATIENT
Start: 2021-01-20 | End: 2021-07-12

## 2021-01-20 RX ORDER — BLOOD-GLUCOSE METER
KIT MISCELLANEOUS
Qty: 600 STRIP | Refills: 5 | Status: SHIPPED | OUTPATIENT
Start: 2021-01-20 | End: 2021-07-27

## 2021-01-20 NOTE — PROGRESS NOTES
following a high fat/cholesterol diet. When asked about meal planning, he reported none. He has not had a previous visit with a dietitian. He rarely participates in exercise. There is no change in his home blood glucose trend. His breakfast blood glucose is taken after 10 am. His breakfast blood glucose range is generally 110-130 mg/dl. An ACE inhibitor/angiotensin II receptor blocker is being taken. He sees a podiatrist.Eye exam is current. Denies any hypoglycemia, , according to mom he has gained weight and is struggling with fungal infection in his groin , he has tried Diflucan   No new complaints    Prior visit with dietician: yes  Current diet: on average, 3 meals per day  Current exercise: rare    Has there been any hospitalization, surgery or major illness since the last visit? No   Has there been any new school, family or social problems since the last visit? No  Has there been any new family history of members with diabetes, heart disease, stroke, or endocrine related problems since the last visit?   No    Past Medical History:   Diagnosis Date    ADHD (attention deficit hyperactivity disorder)     Autism     Chicken pox     CKD (chronic kidney disease)     Depression     Down syndrome     Flu 03/06/2019    Hx: UTI (urinary tract infection)     Hyperlipidemia     Hyperlipidemia 8/23/2018    Hypertension     Hypothyroidism     Insulin resistance 2/5/2018    Microalbuminuria     Obesity     Type 2 diabetes mellitus with diabetic polyneuropathy, with long-term current use of insulin ([address] 2/5/2018    Type 2 diabetes mellitus without complication ([facility])     Vitamin D deficiency       Patient Active Problem List   Diagnosis    Allergic rhinitis    Allergy to mold spores    Attention deficit hyperactivity disorder (ADHD)    CKD (chronic kidney disease) stage 2, GFR 60-89 ml/min    Down syndrome    Essential hypertension    Hypothyroidism    Obesity    Pes cavus    Vitamin D deficiency    Type 2 diabetes mellitus with diabetic polyneuropathy, with long-term current use of insulin (HCC)    Insulin resistance    Hyperlipidemia     Past Surgical History:   Procedure Laterality Date    APPENDECTOMY      TYMPANOSTOMY TUBE PLACEMENT       Social History     Socioeconomic History    Marital status: Single     Spouse name: Not on file    Number of children: Not on file    Years of education: Not on file    Highest education level: Not on file   Occupational History    Not on file   Social Needs    Financial resource strain: Not on file    Food insecurity     Worry: Not on file     Inability: Not on file    Transportation needs     Medical: Not on file     Non-medical: Not on file   Tobacco Use    Smoking status: Never Smoker    Smokeless tobacco: Never Used   Substance and Sexual Activity    Alcohol use: No    Drug use: No    Sexual activity: Not on file   Lifestyle    Physical activity     Days per week: Not on file     Minutes per session: Not on file    Stress: Not on file   Relationships    Social connections     Talks on phone: Not on file     Gets together: Not on file     Attends [employer] service: Not on file     Active member of club or organization: Not on file     Attends meetings of clubs or organizations: Not on file     Relationship status: Not on file    Intimate partner violence     Fear of current or ex partner: Not on file     Emotionally abused: Not on file     Physically abused: Not on file     Forced sexual activity: Not on file   Other Topics Concern    Not on file   Social History Narrative    Not on file     Family History   Problem Relation Age of Onset    Diabetes Mother     High Blood Pressure Mother     High Blood Pressure Father     Other Maternal Grandmother         lymphoma    Heart Disease Maternal Grandfather      Current Outpatient Medications   Medication Sig Dispense Refill    metFORMIN (GLUCOPHAGE-XR) 500 MG extended release tablet 2 TABLETS 2 TIMES A  tablet 2    levothyroxine (SYNTHROID) 112 MCG tablet TAKE ONE TABLET DAILY (PRESCRIBER OK NEEDED FOR NEXT REFILL) 30 tablet 2    VASCEPA 1 g CAPS capsule 2 CAPSULES BY MOUTH 2 TIMES A DAY (PRESCRIBER OK IS NEEDED FOR NEXT FILL) 120 capsule 2    insulin regular human (HUMULIN R) 500 UNIT/ML concentrated injection vial INJECT 175 UNITS(DRAW UP TO 35 UNIT LINE ON U-100 SYRINGE) THREE TIMES A DAY **MAY REFILL** (Patient taking differently: Pt takes 50 units three times a day.) 20 mL 5    Insulin Syringe-Needle U-100 (TRUEPLUS INSULIN SYRINGE) 31G X 5/16\" 0.5 ML MISC USE WITH INSULIN THREE TIMES A DAY **MAY REFILL** 300 each 5    VICTOZA 18 MG/3ML SOPN SC injection INJECT AS DIRECTED - GIVING 3 MG DAILY (PRESCRIBER OK IS NEEDED FOR NEXT FILL) (Patient taking differently: Inject 1.8 mg into the skin daily ) 27 mL 3    blood glucose test strips (FREESTYLE LITE) strip USE TO CHECK BLOOD SUGAR 6 TIMES DAILY 600 strip 5    lisinopril-hydrochlorothiazide (PRINZIDE;ZESTORETIC) 10-12.5 MG per tablet 1 TABLET ONCE DAILY (NEW RX OR MD NEEDS TO BE CONTACTED FOR MORE REFILLS) 30 tablet 5    Lancets MISC Use 4 times a day      traZODone (DESYREL) 100 MG tablet TAKE 1 TO 2 TABLETS BY MOUTH AT BEDTIME **MAY REFILL** **INCREASE IN DIRECTIONS**      venlafaxine (EFFEXOR XR) 150 MG extended release capsule Take 150 mg by mouth       No current facility-administered medications for this visit. Allergies   Allergen Reactions    Amoxicillin      Bladder infection       ROS   I have reviewed the review of system questionnaire filled by the patient .   Patient was advised to contact PCP for non endocrine signs and symptoms         OBJECTIVE:  /76   Pulse 105   Temp 97 °F (36.1 °C)   Resp 16   Ht 6' 7\" (2.007 m)   BMI 43.37 kg/m²    Wt Readings from Last 3 Encounters:   01/29/20 (!) 385 lb (174.6 kg)   08/08/19 (!) 382 lb (173.3 kg)   04/23/19 (!) 385 lb (174.6 kg) Constitutional: no acute distress, well appearing, well nourished  Psychiatric: oriented to person, place and time, judgement, insight and normal, recent and remote memory and intact and mood, affect are normal  Skin: skin and subcutaneous tissue is normal without mass,   Head and Face: examination of head and face revealed no abnormalities  Eyes: no lid or conjunctival swelling, no erythema or discharge, pupils are normal,   Ears/Nose: external inspection of ears and nose revealed no abnormalities, hearing is grossly normal  Oropharynx/Mouth/Face: lips, tongue and gums are normal with no lesions, the voice quality was normal  Neck: neck is supple and symmetric, with midline trachea and no masses, thyroid is normal    Pulmonary: no increased work of breathing or signs of respiratory distress, lungs are clear to auscultation  Cardiovascular: normal heart rate and rhythm, normal S1 and S2,   Musculoskeletal: normal gait and station,   Neurological: normal coordination, normal general cortical function      Lab Results   Component Value Date    LABA1C 8.1 01/19/2021    LABA1C 8.8 01/24/2020    LABA1C 8.2 08/02/2019         ASSESSMENT/PLAN:      Uncontrolled TYPE 2 DIABETES WITH COMPLICATIONS - Microalbuminuria --nephropathy a1c 7.5>>8.8>>7.8>>8.2>>8.8>>8.1    LYNNETTE is on U 500 insulin he has been taking 50  units BID via insulin syringe  --- he will increase to three times daily. --Discussed kiara Aguilar again he is not interested  He still wants to use vials and use the U100 syringe   He was advised to use U 500 syringes   On metformin 1000 twice a day   On Victoza 1.8  mg daily   Today again we discussed dietary modification and the need to change eating habits   Hypoglycemia protocol reviewed in detail with patient Patient was advised to carry glucose tablets and also have glucagon emergency kit. Provided with RX for glucagon.    Patient was advised that sending of his fingerstick blood glucose logs is crucial in management of his diabetes. We will adjust his dose of insulin according to sent data. Health maintenance   -advised to have annual dilated eye exam 2018 no retinopathy   - microalb/creat ratio was elevated in  april 2019  --saw Dr Gracieal Stevens in the past, most recent value still elevated at 125  -foot exam performed  follows with Podiatry Dr Celine Mckeon every 3 month ---  Dec 2020       -for his buffalo hump I did MSCx 2 which came back abnl I am not positive if he even did at the right time saw her in jan 2018 .  -1 mg dex suppression test was normal cortisol was <0.1.   --- repeat 24 hr urine cortisol in August 2019 was again normal    ---Fungal Groin infection   Improved    Still has not seen a urologist     Hypothyroidism   On 112 mcg daily tsh 4.60 in January 2021 will increase the dose to 125 mcg daily and recheck labs at follow up in 3 months   --Patient has been taking his medication regularly he denies any use of iron or calcium with his thyroid medication   Pt appears clinically and biochemically euthyroid on current dose of Levothyroxine , will continue on same dose. Pt was advised to avoid taking Levothyroxine with concomitant Iron and calcium containing supplements and try taking 1/2 hr before eating.    --thyroid uls was normal 8/2014   his Abs were negative.      Elevated LFTS now getting better   usg showed fatty liver   Discussed the need to follow up with PCP   Discussed weight loss in detail     Autism   stable     Hypertension   He is on lisinopril /hct   Not checked recently but denies any headaches or chestpain     Hypertriglyceridemia   He has been taking Lovaza 4 gram daily and have repeat labs in 3 months   LDL 85>>62 -Tgs 289>>330>> 199  He has  been taking  Statins  regularly last LDL level was 92 in January 2021    VIT D DEF   levels improved from 5 ----24 >>26   Advised to take 5000 iu dialy   advised to increase sun exposure     OBesity   Advised to step up his diet and exercise. He lost 20 lbs in summer 2016   I had a lengthy discussion with the patient regarding caloric restriction as well as stepping up exercise. We discussed caloric goal in detail. Also discussed with patient the utility of mobile phone apps like \" my fitness Pal \" or \"Lose It \". Patient verbalized understanding and agrees to work on this aspect. he has no stigma of  Cushing disease or untreated thyroid disorder. Previous workup was negative     Depression   On effexor and welbutrin as per PCP      Reviewed and/or ordered clinical lab results Yes  Reviewed and/or ordered radiology tests Yes  Reviewed and/or ordered other diagnostic tests Yes  Made a decision to obtain old records Yes  Reviewed and summarized old records Yes      Rayne Whitney was counseled regarding symptoms of current diagnosis, course and complications of disease if inadequately treated, side effects of medications, diagnosis, treatment options, and prognosis, risks, benefits, complications, and alternatives of treatment, labs, imaging and other studies and treatment targets and goals. He understands instructions and counseling. These diagnosis were discussed and reviewed with the patient including the advantages of drug therapy. He was counseled at this visit on the following: diabetes complication prevention and foot care. Return in about 3 months (around 4/20/2021).

## 2021-02-12 DIAGNOSIS — E78.2 MIXED HYPERLIPIDEMIA: ICD-10-CM

## 2021-02-12 RX ORDER — LEVOTHYROXINE SODIUM 112 UG/1
TABLET ORAL
Qty: 30 TABLET | Refills: 2 | Status: SHIPPED | OUTPATIENT
Start: 2021-02-12 | End: 2021-05-05

## 2021-02-12 RX ORDER — ICOSAPENT ETHYL 1000 MG/1
CAPSULE ORAL
Qty: 120 CAPSULE | Refills: 2 | Status: SHIPPED | OUTPATIENT
Start: 2021-02-12 | End: 2021-07-22

## 2021-02-15 ENCOUNTER — TELEPHONE (OUTPATIENT)
Dept: ENDOCRINOLOGY | Age: 36
End: 2021-02-15

## 2021-02-15 NOTE — TELEPHONE ENCOUNTER
[facility] and to let us know and we will send to pharmacy.

## 2021-02-24 NOTE — TELEPHONE ENCOUNTER
Pt's mom called back and states pharmacy /insurance sent her a message stating the Humulin needs a Prior Auth    And maybe the Bruce Valadezy / Napoleon Corolla will need a Prior Auth also? ?

## 2021-02-25 NOTE — TELEPHONE ENCOUNTER
Fax from [employer]weta Ramey with the PA approval for the Humulin R U-500 500 unit/ml SC SOLN.   Approved from 1/1/21 - 12/31/21

## 2021-02-26 ENCOUNTER — TELEPHONE (OUTPATIENT)
Dept: ENDOCRINOLOGY | Age: 36
End: 2021-02-26

## 2021-02-26 NOTE — TELEPHONE ENCOUNTER
I'm a little confused by this since they did handle the humulin PA I just did. According to the Niraj Adkinsed is covered. A generic form just came out and the letter from Shweta Ramey used the generic name. Maybe the pharmacy tried billing a generic instead of Brand Vascepa. If not, I can call the insurance company to see if I can find anything out.

## 2021-03-18 DIAGNOSIS — E11.42 TYPE 2 DIABETES MELLITUS WITH DIABETIC POLYNEUROPATHY, WITH LONG-TERM CURRENT USE OF INSULIN (HCC): ICD-10-CM

## 2021-03-18 DIAGNOSIS — Z79.4 TYPE 2 DIABETES MELLITUS WITH DIABETIC POLYNEUROPATHY, WITH LONG-TERM CURRENT USE OF INSULIN (HCC): ICD-10-CM

## 2021-03-19 RX ORDER — LISINOPRIL AND HYDROCHLOROTHIAZIDE 12.5; 1 MG/1; MG/1
TABLET ORAL
Qty: 30 TABLET | Refills: 5 | Status: SHIPPED | OUTPATIENT
Start: 2021-03-19 | End: 2021-09-09

## 2021-03-19 NOTE — TELEPHONE ENCOUNTER
PT needs Urgent refill of BP med Lisinopril sent to [facility], Routing to Provider on call Dr. Janetta Fleischer

## 2021-03-25 NOTE — TELEPHONE ENCOUNTER
Fax from Patito Valenzuela regarding the PA approval for the Icosapent Ethyl 1gm or caps.   Approval for dates 1/1/21 - 12/31/21

## 2021-03-26 RX ORDER — INSULIN HUMAN 500 [IU]/ML
INJECTION, SOLUTION SUBCUTANEOUS
Qty: 20 ML | Refills: 5 | Status: SHIPPED | OUTPATIENT
Start: 2021-03-26 | End: 2021-07-21

## 2021-04-14 ENCOUNTER — TELEPHONE (OUTPATIENT)
Dept: ENDOCRINOLOGY | Age: 36
End: 2021-04-14

## 2021-04-14 DIAGNOSIS — Z79.4 TYPE 2 DIABETES MELLITUS WITH DIABETIC POLYNEUROPATHY, WITH LONG-TERM CURRENT USE OF INSULIN (HCC): Primary | ICD-10-CM

## 2021-04-14 DIAGNOSIS — E11.42 TYPE 2 DIABETES MELLITUS WITH DIABETIC POLYNEUROPATHY, WITH LONG-TERM CURRENT USE OF INSULIN (HCC): Primary | ICD-10-CM

## 2021-04-14 RX ORDER — LIRAGLUTIDE 6 MG/ML
INJECTION SUBCUTANEOUS
Qty: 27 ML | Refills: 3 | Status: SHIPPED | OUTPATIENT
Start: 2021-04-14 | End: 2021-05-05 | Stop reason: SDUPTHER

## 2021-04-14 NOTE — TELEPHONE ENCOUNTER
Fax from [facility] regarding the prescription for Victoza Inj 18mg/3ml. The plan has provided the PT with a temporary supply of the medication.

## 2021-04-30 ENCOUNTER — TELEPHONE (OUTPATIENT)
Dept: ENDOCRINOLOGY | Age: 36
End: 2021-04-30

## 2021-04-30 DIAGNOSIS — E11.42 TYPE 2 DIABETES MELLITUS WITH DIABETIC POLYNEUROPATHY, WITH LONG-TERM CURRENT USE OF INSULIN (HCC): ICD-10-CM

## 2021-04-30 DIAGNOSIS — Z79.4 TYPE 2 DIABETES MELLITUS WITH DIABETIC POLYNEUROPATHY, WITH LONG-TERM CURRENT USE OF INSULIN (HCC): ICD-10-CM

## 2021-04-30 NOTE — TELEPHONE ENCOUNTER
Fax from [facility] regarding the medication Victoza Inj 18 mg/3ml. The drug is not covered on Formulary.       LOV:  1/20/21      FOV:  5/5/21

## 2021-05-03 ENCOUNTER — HOSPITAL ENCOUNTER (OUTPATIENT)
Age: 36
Discharge: HOME | End: 2021-05-03
Payer: COMMERCIAL

## 2021-05-03 DIAGNOSIS — E55.9 VITAMIN D DEFICIENCY: ICD-10-CM

## 2021-05-03 DIAGNOSIS — Z79.4 TYPE 2 DIABETES MELLITUS WITH DIABETIC POLYNEUROPATHY, WITH LONG-TERM CURRENT USE OF INSULIN (HCC): ICD-10-CM

## 2021-05-03 DIAGNOSIS — E11.42 TYPE 2 DIABETES MELLITUS WITH DIABETIC POLYNEUROPATHY, WITH LONG-TERM CURRENT USE OF INSULIN (HCC): ICD-10-CM

## 2021-05-03 LAB
A/G RATIO: 1.6 (ref 1.1–2.2)
ALBUMIN SERPL-MCNC: 4.4 G/DL (ref 3.4–5)
ALP BLD-CCNC: 51 U/L (ref 40–129)
ALT SERPL-CCNC: 51 U/L (ref 10–40)
ANION GAP SERPL CALCULATED.3IONS-SCNC: 10 MMOL/L (ref 3–16)
AST SERPL-CCNC: 31 U/L (ref 15–37)
BILIRUB SERPL-MCNC: 0.3 MG/DL (ref 0–1)
BUN BLDV-MCNC: 13 MG/DL (ref 7–20)
CALCIUM SERPL-MCNC: 9.2 MG/DL (ref 8.3–10.6)
CHLORIDE BLD-SCNC: 101 MMOL/L (ref 99–110)
CHOLESTEROL, TOTAL: 183 MG/DL (ref 0–199)
CO2: 29 MMOL/L (ref 21–32)
CREAT SERPL-MCNC: 1 MG/DL (ref 0.9–1.3)
CREATININE URINE: 158 MG/DL (ref 39–259)
ESTIMATED AVERAGE GLUCOSE: 200.1 MG/DL
GFR AFRICAN AMERICAN: >60
GFR NON-AFRICAN AMERICAN: >60
GLOBULIN: 2.8 G/DL
GLUCOSE BLD-MCNC: 175 MG/DL (ref 70–99)
HBA1C MFR BLD: 8.6 %
HDLC SERPL-MCNC: 33 MG/DL (ref 40–60)
LDL CHOLESTEROL CALCULATED: 95 MG/DL
MICROALBUMIN UR-MCNC: 18.3 MG/DL
MICROALBUMIN/CREAT UR-RTO: 115.8 MG/G (ref 0–30)
POTASSIUM SERPL-SCNC: 4.8 MMOL/L (ref 3.5–5.1)
SODIUM BLD-SCNC: 140 MMOL/L (ref 136–145)
TOTAL PROTEIN: 7.2 G/DL (ref 6.4–8.2)
TRIGL SERPL-MCNC: 273 MG/DL (ref 0–150)
TSH SERPL DL<=0.05 MIU/L-ACNC: 1.88 UIU/ML (ref 0.27–4.2)
VLDLC SERPL CALC-MCNC: 55 MG/DL

## 2021-05-03 PROCEDURE — 84443 ASSAY THYROID STIM HORMONE: CPT

## 2021-05-03 PROCEDURE — 80053 COMPREHEN METABOLIC PANEL: CPT

## 2021-05-03 PROCEDURE — 36415 COLL VENOUS BLD VENIPUNCTURE: CPT

## 2021-05-03 PROCEDURE — 82043 UR ALBUMIN QUANTITATIVE: CPT

## 2021-05-03 PROCEDURE — 80061 LIPID PANEL: CPT

## 2021-05-03 PROCEDURE — 82570 ASSAY OF URINE CREATININE: CPT

## 2021-05-03 PROCEDURE — 83036 HEMOGLOBIN GLYCOSYLATED A1C: CPT

## 2021-05-05 ENCOUNTER — OFFICE VISIT (OUTPATIENT)
Dept: ENDOCRINOLOGY | Age: 36
End: 2021-05-05
Payer: COMMERCIAL

## 2021-05-05 VITALS
SYSTOLIC BLOOD PRESSURE: 123 MMHG | DIASTOLIC BLOOD PRESSURE: 70 MMHG | HEIGHT: 78 IN | BODY MASS INDEX: 36.45 KG/M2 | WEIGHT: 315 LBS | RESPIRATION RATE: 16 BRPM | HEART RATE: 103 BPM

## 2021-05-05 DIAGNOSIS — E55.9 VITAMIN D DEFICIENCY: Primary | ICD-10-CM

## 2021-05-05 PROCEDURE — 99214 OFFICE O/P EST MOD 30 MIN: CPT | Performed by: INTERNAL MEDICINE

## 2021-05-05 RX ORDER — CALCIUM CARB/VITAMIN D3/VIT K1 500-100-40
1 TABLET,CHEWABLE ORAL DAILY
Qty: 100 SYRINGE | Refills: 6 | Status: SHIPPED | OUTPATIENT
Start: 2021-05-05 | End: 2022-01-12 | Stop reason: SDUPTHER

## 2021-05-05 RX ORDER — LIRAGLUTIDE 6 MG/ML
INJECTION SUBCUTANEOUS
Qty: 3 PEN | Refills: 3 | Status: SHIPPED | OUTPATIENT
Start: 2021-05-05 | End: 2021-07-06

## 2021-05-05 NOTE — TELEPHONE ENCOUNTER
The fax from [facility] states that [address] [facility] is covered on their formulary but has a qty limit of 3 pens per 30 days which is the 1.8mg dose. Is pt taking more than this or is the pharmacy trying to run the script for more? Please advise. Thanks!

## 2021-05-05 NOTE — PROGRESS NOTES
Kamla Wren is a 37 y.o. male who follows in endocrine clinic for uncontrolled Type 2 diabetes mellitus, ess hypertension ,hyperlipidemia, hypothyroidism  and  severe obesity . He was  diagnosed with diabetes at age 17 years he was started on Oral pills metfromin for years and then he was switched to insulin he was on 70/30 combination LYNNETTE is a high function autistic adult , he was  followed at [facility] in the past , and he has a twin brother who is a diabetic too. Pt apparently lives with his mom but still fixes his own meals and skips his insulin doses and doesn't follow a diabetic diet and doesn't exercise much either ---Mom is a diabetic too but she lets Harvey Markham make his own decision and according to mom she has noticed a lot of noncompliance on pt's part . He is also noted to have Vit d level was only 5 initially   He also has hypertension and is on meds   He was diagnoseed with ADHD and follows with his PCP   He  lost 40 lbs in summer 2016 and his finger stick blood glucose  improved , he gained  the weight back   Pt also has hyperlipidemia and is on medication    INTERIM:    Diabetes  He presents for his follow-up diabetic visit. He has type 2 diabetes mellitus. No MedicAlert identification noted. The initial diagnosis of diabetes was made 18 years ago. His disease course has been stable. Hypoglycemia symptoms include nervousness/anxiousness and sweats. Pertinent negatives for hypoglycemia include no speech difficulty or tremors. Associated symptoms include fatigue and weakness. There are no hypoglycemic complications. Pertinent negatives for hypoglycemia complications include no required glucagon injection. Symptoms are stable. Diabetic complications include nephropathy. Risk factors for coronary artery disease include diabetes mellitus, dyslipidemia, hypertension and male sex. Current diabetic treatment includes intensive insulin program. He is compliant with treatment some of the time.  He is following a high fat/cholesterol diet. When asked about meal planning, he reported none. He has not had a previous visit with a dietitian. He rarely participates in exercise. There is no change in his home blood glucose trend. His breakfast blood glucose is taken after 10 am. His breakfast blood glucose range is generally 110-130 mg/dl. An ACE inhibitor/angiotensin II receptor blocker is being taken. He sees a podiatrist.Eye exam is current. Denies any hypoglycemia, , according to mom he has gained weight and is struggling with fungal infection in his groin , he has tried Diflucan   No new complaints    Prior visit with dietician: yes  Current diet: on average, 3 meals per day  Current exercise: rare    Has there been any hospitalization, surgery or major illness since the last visit? No   Has there been any new school, family or social problems since the last visit? No  Has there been any new family history of members with diabetes, heart disease, stroke, or endocrine related problems since the last visit?   No    Past Medical History:   Diagnosis Date    ADHD (attention deficit hyperactivity disorder)     Autism     Chicken pox     CKD (chronic kidney disease)     Depression     Down syndrome     Flu 03/06/2019    Hx: UTI (urinary tract infection)     Hyperlipidemia     Hyperlipidemia 8/23/2018    Hypertension     Hypothyroidism     Insulin resistance 2/5/2018    Microalbuminuria     Obesity     Type 2 diabetes mellitus with diabetic polyneuropathy, with long-term current use of insulin ([address] 2/5/2018    Type [facility] without complication ([facility])     Vitamin D deficiency       Patient Active Problem List   Diagnosis    Allergic rhinitis    Allergy to mold spores    Attention deficit hyperactivity disorder (ADHD)    CKD (chronic kidney disease) stage 2, GFR 60-89 ml/min    Down syndrome    Essential hypertension    Hypothyroidism    Obesity    Pes cavus    Vitamin D deficiency    Type 2 diabetes mellitus with diabetic polyneuropathy, with long-term current use of insulin (HCC)    Insulin resistance    Hyperlipidemia     Past Surgical History:   Procedure Laterality Date    APPENDECTOMY      TYMPANOSTOMY TUBE PLACEMENT       Social History     Socioeconomic History    Marital status: Single     Spouse name: Not on file    Number of children: Not on file    Years of education: Not on file    Highest education level: Not on file   Occupational History    Not on file   Social Needs    Financial resource strain: Not on file    Food insecurity     Worry: Not on file     Inability: Not on file    Transportation needs     Medical: Not on file     Non-medical: Not on file   Tobacco Use    Smoking status: Never Smoker    Smokeless tobacco: Never Used   Substance and Sexual Activity    Alcohol use: No    Drug use: No    Sexual activity: Not on file   Lifestyle    Physical activity     Days per week: Not on file     Minutes per session: Not on file    Stress: Not on file   Relationships    Social connections     Talks on phone: Not on file     Gets together: Not on file     Attends [employer] service: Not on file     Active member of club or organization: Not on file     Attends meetings of clubs or organizations: Not on file     Relationship status: Not on file    Intimate partner violence     Fear of current or ex partner: Not on file     Emotionally abused: Not on file     Physically abused: Not on file     Forced sexual activity: Not on file   Other Topics Concern    Not on file   Social History Narrative    Not on file     Family History   Problem Relation Age of Onset    Diabetes Mother     High Blood Pressure Mother     High Blood Pressure Father     Other Maternal Grandmother         lymphoma    Heart Disease Maternal Grandfather      Current Outpatient Medications   Medication Sig Dispense Refill    Insulin Syringe-Needle U-100 (INSULIN SYRINGE 1CC/31GX5/16\") 31G X 5/16\" 1 ML MISC 1 each by Does not apply route daily 100 Syringe 6    metFORMIN (GLUCOPHAGE-XR) 500 MG extended release tablet 2 TABLETS 2 TIMES A  tablet 2    HUMULIN R 500 UNIT/ML concentrated injection vial INJECT 175 UNITS(DRAW UP TO 35 UNIT LINE ON U-100 SYRINGE) THREE TIMES A DAY **MAY REFILL** 20 mL 5    lisinopril-hydroCHLOROthiazide (PRINZIDE;ZESTORETIC) 10-12.5 MG per tablet 1 TABLET ONCE DAILY (NEW RX OR MD NEEDS TO BE CONTACTED FOR MORE REFILLS) 30 tablet 5    Icosapent Ethyl (VASCEPA) 1 g CAPS capsule 2 CAPSULES BY MOUTH 2 TIMES A DAY (PRESCRIBER OK IS NEEDED FOR NEXT FILL) 120 capsule 2    blood glucose test strips (FREESTYLE LITE) strip USE TO CHECK BLOOD SUGAR 6 TIMES DAILY 600 strip 5    vitamin D (ERGOCALCIFEROL) 1.25 MG (67227 UT) CAPS capsule TAKE 1 CAPSULE (50,000 UNITS TOTAL) BY MOUTH ONCE A WEEK. (PRESCRIBER OK IS NEEDED FOR NEXT FILL) 4 capsule 5    levothyroxine (SYNTHROID) 125 MCG tablet Take 1 tablet by mouth daily 90 tablet 1    Insulin Syringe-Needle U-100 (TRUEPLUS INSULIN SYRINGE) 31G X 5/16\" 0.5 ML MISC USE WITH INSULIN THREE TIMES A DAY **MAY REFILL** 300 each 5    Lancets MISC Use 4 times a day      traZODone (DESYREL) 100 MG tablet TAKE 1 TO 2 TABLETS BY MOUTH AT BEDTIME **MAY REFILL** **INCREASE IN DIRECTIONS**      venlafaxine (EFFEXOR XR) 150 MG extended release capsule Take 150 mg by mouth      Liraglutide (VICTOZA) 18 MG/3ML SOPN SC injection Inject 1.8mg daily into the skin 3 pen 3     No current facility-administered medications for this visit. Allergies   Allergen Reactions    Amoxicillin      Bladder infection       ROS   I have reviewed the review of system questionnaire filled by the patient .   Patient was advised to contact PCP for non endocrine signs and symptoms         OBJECTIVE:  /70   Pulse 103   Resp 16   Ht 6' 7\" (2.007 m)   Wt (!) 401 lb (181.9 kg)   BMI 45.17 kg/m²    Wt Readings from Last 3 Encounters: 05/05/21 (!) 401 lb (181.9 kg)   01/29/20 (!) 385 lb (174.6 kg)   08/08/19 (!) 382 lb (173.3 kg)       Constitutional: no acute distress, well appearing, well nourished  Psychiatric: oriented to person, place and time, judgement, insight and normal, recent and remote memory and intact and mood, affect are normal  Skin: skin and subcutaneous tissue is normal without mass,   Head and Face: examination of head and face revealed no abnormalities  Eyes: no lid or conjunctival swelling, no erythema or discharge, pupils are normal,   Ears/Nose: external inspection of ears and nose revealed no abnormalities, hearing is grossly normal  Oropharynx/Mouth/Face: lips, tongue and gums are normal with no lesions, the voice quality was normal  Neck: neck is supple and symmetric, with midline trachea and no masses, thyroid is normal    Pulmonary: no increased work of breathing or signs of respiratory distress, lungs are clear to auscultation  Cardiovascular: normal heart rate and rhythm, normal S1 and S2,   Musculoskeletal: normal gait and station,   Neurological: normal coordination, normal general cortical function      Lab Results   Component Value Date    LABA1C 8.6 05/03/2021    LABA1C 8.1 01/19/2021    LABA1C 8.8 01/24/2020         ASSESSMENT/PLAN:      Uncontrolled TYPE 2 DIABETES WITH COMPLICATIONS - Microalbuminuria --nephropathy a1c 7.5>>8.8>>7.8>>8.2>>8.8>>8.1    LYNNETTE is on U 500 insulin he has been taking 50  units BID via insulin syringe  --- he will increase to three times daily.    Denies any hypoglycemia --Discussed kiara Cornejo again he is not interested  He still wants to use vials and use the U100 syringe   He was advised to use U 500 syringes   On metformin 1000 twice a day   On Victoza 1.8  mg daily   Today again we discussed dietary modification and the need to change eating habits   Hypoglycemia protocol reviewed in detail with patient Patient was advised to carry glucose tablets and also have glucagon emergency kit. Provided with RX for glucagon. Patient was advised that sending of his fingerstick blood glucose logs is crucial in management of his diabetes. We will adjust his dose of insulin according to sent data. Health maintenance   -advised to have annual dilated eye exam 2018 no retinopathy   - microalb/creat ratio was elevated in  april 2019  --saw Dr Nan Rojas in the past, most recent value still elevated at 125  -foot exam performed  follows with Podiatry Dr Reed Pemberton every 3 month ---  Dec 2020       -for his buffalo hump I did MSCx 2 which came back abnl I am not positive if he even did at the right time saw her in jan 2018 .  -1 mg dex suppression test was normal cortisol was <0.1.   --- repeat 24 hr urine cortisol in August 2019 was again normal    ---Fungal Groin infection   Improved    Still has not seen a urologist.    Hypothyroidism   On 112 mcg daily tsh 4.60 in January 2021 will increase the dose to 125 mcg daily and recheck labs at follow up in 3 months   --Patient has been taking his medication regularly he denies any use of iron or calcium with his thyroid medication   Pt appears clinically and biochemically euthyroid on current dose of Levothyroxine , will continue on same dose. Pt was advised to avoid taking Levothyroxine with concomitant Iron and calcium containing supplements and try taking 1/2 hr before eating.    --thyroid uls was normal 8/2014   his Abs were negative.      Elevated LFTS now getting better   usg showed fatty liver   Discussed the need to follow up with PCP   Discussed weight loss in detail     Autism   stable     Hypertension   He is on lisinopril /hct   Not checked recently but denies any headaches or chestpain     Hypertriglyceridemia   He has been taking Lovaza 4 gram daily and have repeat labs in 3 months   LDL 85>>62 -Tgs 289>>330>> 199  He has  been taking  Statins  regularly last LDL level was 92 in January 2021    VIT D DEF   levels improved from 5 ----24 >>26

## 2021-05-07 NOTE — PROGRESS NOTES
Reinaldo Hickey is a 37 y.o. male who follows in endocrine clinic for uncontrolled Type 2 diabetes mellitus, ess hypertension ,hyperlipidemia, hypothyroidism  and  severe obesity . He was  diagnosed with diabetes at age 17 years he was started on Oral pills metfromin for years and then he was switched to insulin he was on 70/30 combination LYNNETTE is a high function autistic adult , he was  followed at [facility] in the past , and he has a twin brother who is a diabetic too. Pt apparently lives with his mom but still fixes his own meals and skips his insulin doses and doesn't follow a diabetic diet and doesn't exercise much either ---Mom is a diabetic too but she lets Faustina Rojas make his own decision and according to mom she has noticed a lot of noncompliance on pt's part . He is also noted to have Vit d level was only 5 initially   He also has hypertension and is on meds   He was diagnoseed with ADHD and follows with his PCP   He  lost 40 lbs in summer 2016 and his finger stick blood glucose  improved , he gained  the weight back   Pt also has hyperlipidemia and is on medication    INTERIM:    Diabetes  He presents for his follow-up diabetic visit. He has type 2 diabetes mellitus. No MedicAlert identification noted. The initial diagnosis of diabetes was made 18 years ago. His disease course has been stable. Hypoglycemia symptoms include nervousness/anxiousness and sweats. Pertinent negatives for hypoglycemia include no speech difficulty or tremors. Associated symptoms include fatigue and weakness. There are no hypoglycemic complications. Pertinent negatives for hypoglycemia complications include no required glucagon injection. Symptoms are stable. Diabetic complications include nephropathy. Risk factors for coronary artery disease include diabetes mellitus, dyslipidemia, hypertension and male sex. Current diabetic treatment includes intensive insulin program. He is compliant with treatment some of the time.  He is following a high fat/cholesterol diet. When asked about meal planning, he reported none. He has not had a previous visit with a dietitian. He rarely participates in exercise. There is no change in his home blood glucose trend. His breakfast blood glucose is taken after 10 am. His breakfast blood glucose range is generally 110-130 mg/dl. An ACE inhibitor/angiotensin II receptor blocker is being taken. He sees a podiatrist.Eye exam is current. Denies any hypoglycemia, , according to mom he has gained weight and is struggling with fungal infection in his groin , he has tried Diflucan   No new complaints    Prior visit with dietician: yes  Current diet: on average, 3 meals per day  Current exercise: rare    Has there been any hospitalization, surgery or major illness since the last visit? No   Has there been any new school, family or social problems since the last visit? No  Has there been any new family history of members with diabetes, heart disease, stroke, or endocrine related problems since the last visit?   No    Past Medical History:   Diagnosis Date    ADHD (attention deficit hyperactivity disorder)     Autism     Chicken pox     CKD (chronic kidney disease)     Depression     Down syndrome     Flu 03/06/2019    Hx: UTI (urinary tract infection)     Hyperlipidemia     Hyperlipidemia 8/23/2018    Hypertension     Hypothyroidism     Insulin resistance 2/5/2018    Microalbuminuria     Obesity     Type 2 diabetes mellitus with diabetic polyneuropathy, with long-term current use of insulin ([address] 2/5/2018    Type 2 diabetes mellitus without complication ([facility])     Vitamin D deficiency       Patient Active Problem List   Diagnosis    Allergic rhinitis    Allergy to mold spores    Attention deficit hyperactivity disorder (ADHD)    CKD (chronic kidney disease) stage 2, GFR 60-89 ml/min    Down syndrome    Essential hypertension    Hypothyroidism    Obesity    Pes cavus    Vitamin D deficiency    Type 2 diabetes mellitus with diabetic polyneuropathy, with long-term current use of insulin (HCC)    Insulin resistance    Hyperlipidemia     Past Surgical History:   Procedure Laterality Date    APPENDECTOMY      TYMPANOSTOMY TUBE PLACEMENT       Social History     Socioeconomic History    Marital status: Single     Spouse name: Not on file    Number of children: Not on file    Years of education: Not on file    Highest education level: Not on file   Occupational History    Not on file   Social Needs    Financial resource strain: Not on file    Food insecurity     Worry: Not on file     Inability: Not on file    Transportation needs     Medical: Not on file     Non-medical: Not on file   Tobacco Use    Smoking status: Never Smoker    Smokeless tobacco: Never Used   Substance and Sexual Activity    Alcohol use: No    Drug use: No    Sexual activity: Not on file   Lifestyle    Physical activity     Days per week: Not on file     Minutes per session: Not on file    Stress: Not on file   Relationships    Social connections     Talks on phone: Not on file     Gets together: Not on file     Attends [employer] service: Not on file     Active member of club or organization: Not on file     Attends meetings of clubs or organizations: Not on file     Relationship status: Not on file    Intimate partner violence     Fear of current or ex partner: Not on file     Emotionally abused: Not on file     Physically abused: Not on file     Forced sexual activity: Not on file   Other Topics Concern    Not on file   Social History Narrative    Not on file     Family History   Problem Relation Age of Onset    Diabetes Mother     High Blood Pressure Mother     High Blood Pressure Father     Other Maternal Grandmother         lymphoma    Heart Disease Maternal Grandfather      Current Outpatient Medications   Medication Sig Dispense Refill    Insulin Syringe-Needle U-100 (INSULIN SYRINGE 1CC/31GX5/16\") 31G X 5/16\" 1 ML MISC 1 each by Does not apply route daily 100 Syringe 6    metFORMIN (GLUCOPHAGE-XR) 500 MG extended release tablet 2 TABLETS 2 TIMES A  tablet 2    HUMULIN R 500 UNIT/ML concentrated injection vial INJECT 175 UNITS(DRAW UP TO 35 UNIT LINE ON U-100 SYRINGE) THREE TIMES A DAY **MAY REFILL** 20 mL 5    lisinopril-hydroCHLOROthiazide (PRINZIDE;ZESTORETIC) 10-12.5 MG per tablet 1 TABLET ONCE DAILY (NEW RX OR MD NEEDS TO BE CONTACTED FOR MORE REFILLS) 30 tablet 5    Icosapent Ethyl (VASCEPA) 1 g CAPS capsule 2 CAPSULES BY MOUTH 2 TIMES A DAY (PRESCRIBER OK IS NEEDED FOR NEXT FILL) 120 capsule 2    blood glucose test strips (FREESTYLE LITE) strip USE TO CHECK BLOOD SUGAR 6 TIMES DAILY 600 strip 5    vitamin D (ERGOCALCIFEROL) 1.25 MG (56076 UT) CAPS capsule TAKE 1 CAPSULE (50,000 UNITS TOTAL) BY MOUTH ONCE A WEEK. (PRESCRIBER OK IS NEEDED FOR NEXT FILL) 4 capsule 5    levothyroxine (SYNTHROID) 125 MCG tablet Take 1 tablet by mouth daily 90 tablet 1    Insulin Syringe-Needle U-100 (TRUEPLUS INSULIN SYRINGE) 31G X 5/16\" 0.5 ML MISC USE WITH INSULIN THREE TIMES A DAY **MAY REFILL** 300 each 5    Lancets MISC Use 4 times a day      traZODone (DESYREL) 100 MG tablet TAKE 1 TO 2 TABLETS BY MOUTH AT BEDTIME **MAY REFILL** **INCREASE IN DIRECTIONS**      venlafaxine (EFFEXOR XR) 150 MG extended release capsule Take 150 mg by mouth      Liraglutide (VICTOZA) 18 MG/3ML SOPN SC injection Inject 1.8mg daily into the skin 3 pen 3     No current facility-administered medications for this visit. Allergies   Allergen Reactions    Amoxicillin      Bladder infection       ROS   I have reviewed the review of system questionnaire filled by the patient .   Patient was advised to contact PCP for non endocrine signs and symptoms         OBJECTIVE:  /70   Pulse 103   Resp 16   Ht 6' 7\" (2.007 m)   Wt (!) 401 lb (181.9 kg)   BMI 45.17 kg/m²    Wt Readings from Last 3 Encounters: 05/05/21 (!) 401 lb (181.9 kg)   01/29/20 (!) 385 lb (174.6 kg)   08/08/19 (!) 382 lb (173.3 kg)       Constitutional: no acute distress, well appearing, well nourished  Psychiatric: oriented to person, place and time, judgement, insight and normal, recent and remote memory and intact and mood, affect are normal  Skin: skin and subcutaneous tissue is normal without mass,   Head and Face: examination of head and face revealed no abnormalities  Eyes: no lid or conjunctival swelling, no erythema or discharge, pupils are normal,   Ears/Nose: external inspection of ears and nose revealed no abnormalities, hearing is grossly normal  Oropharynx/Mouth/Face: lips, tongue and gums are normal with no lesions, the voice quality was normal  Neck: neck is supple and symmetric, with midline trachea and no masses, thyroid is normal    Pulmonary: no increased work of breathing or signs of respiratory distress, lungs are clear to auscultation  Cardiovascular: normal heart rate and rhythm, normal S1 and S2,   Musculoskeletal: normal gait and station,   Neurological: normal coordination, normal general cortical function      Lab Results   Component Value Date    LABA1C 8.6 05/03/2021    LABA1C 8.1 01/19/2021    LABA1C 8.8 01/24/2020         ASSESSMENT/PLAN:      Uncontrolled TYPE 2 DIABETES WITH COMPLICATIONS - Microalbuminuria --nephropathy a1c 7.5>>8.8>>7.8>>8.2>>8.8>>8.1    LYNNETTE is on U 500 insulin he has been taking 50  units BID via insulin syringe  --- he will increase to three times daily.    Denies any hypoglycemia --Discussed kiara Cornejo again he is not interested  He still wants to use vials and use the U100 syringe   He was advised to use U 500 syringes   On metformin 1000 twice a day   On Victoza 1.8  mg daily   Today again we discussed dietary modification and the need to change eating habits   Hypoglycemia protocol reviewed in detail with patient Patient was advised to carry glucose tablets and also have glucagon emergency kit. Provided with RX for glucagon. Patient was advised that sending of his fingerstick blood glucose logs is crucial in management of his diabetes. We will adjust his dose of insulin according to sent data. Health maintenance   -advised to have annual dilated eye exam 2018 no retinopathy   - microalb/creat ratio was elevated in  april 2019  --saw Dr Best Rubi in the past, most recent value still elevated at 125  -foot exam performed  follows with Podiatry Dr Dave Ho every 3 month ---  Dec 2020       -for his buffalo hump I did MSCx 2 which came back abnl I am not positive if he even did at the right time saw her in jan 2018 .  -1 mg dex suppression test was normal cortisol was <0.1.   --- repeat 24 hr urine cortisol in August 2019 was again normal    ---Fungal Groin infection   Improved    Still has not seen a urologist.    Hypothyroidism   On 112 mcg daily tsh 4.60 in January 2021 will increase the dose to 125 mcg daily and recheck labs at follow up in 3 months   --Patient has been taking his medication regularly he denies any use of iron or calcium with his thyroid medication   Pt appears clinically and biochemically euthyroid on current dose of Levothyroxine , will continue on same dose. Pt was advised to avoid taking Levothyroxine with concomitant Iron and calcium containing supplements and try taking 1/2 hr before eating.    --thyroid uls was normal 8/2014   his Abs were negative.      Elevated LFTS now getting better   usg showed fatty liver   Discussed the need to follow up with PCP   Discussed weight loss in detail     Autism   stable     Hypertension   He is on lisinopril /hct   Not checked recently but denies any headaches or chestpain     Hypertriglyceridemia   He has been taking Lovaza 4 gram daily and have repeat labs in 3 months   LDL 85>>62 -Tgs 289>>330>> 199  He has  been taking  Statins  regularly last LDL level was 92 in January 2021    VIT D DEF   levels improved from 5 ----24 >>26

## 2021-07-03 DIAGNOSIS — Z79.4 TYPE 2 DIABETES MELLITUS WITH DIABETIC POLYNEUROPATHY, WITH LONG-TERM CURRENT USE OF INSULIN (HCC): ICD-10-CM

## 2021-07-03 DIAGNOSIS — E11.42 TYPE 2 DIABETES MELLITUS WITH DIABETIC POLYNEUROPATHY, WITH LONG-TERM CURRENT USE OF INSULIN (HCC): ICD-10-CM

## 2021-07-06 RX ORDER — LIRAGLUTIDE 6 MG/ML
INJECTION SUBCUTANEOUS
Qty: 9 ML | Refills: 3 | Status: SHIPPED | OUTPATIENT
Start: 2021-07-06 | End: 2022-01-12 | Stop reason: SDUPTHER

## 2021-07-12 DIAGNOSIS — Z79.4 TYPE 2 DIABETES MELLITUS WITH DIABETIC POLYNEUROPATHY, WITH LONG-TERM CURRENT USE OF INSULIN (HCC): ICD-10-CM

## 2021-07-12 DIAGNOSIS — E11.42 TYPE 2 DIABETES MELLITUS WITH DIABETIC POLYNEUROPATHY, WITH LONG-TERM CURRENT USE OF INSULIN (HCC): ICD-10-CM

## 2021-07-12 DIAGNOSIS — E55.9 VITAMIN D DEFICIENCY: ICD-10-CM

## 2021-07-12 RX ORDER — ERGOCALCIFEROL 1.25 MG/1
CAPSULE ORAL
Qty: 4 CAPSULE | Refills: 5 | Status: SHIPPED | OUTPATIENT
Start: 2021-07-12 | End: 2022-01-04

## 2021-07-12 RX ORDER — METFORMIN HYDROCHLORIDE 500 MG/1
TABLET, EXTENDED RELEASE ORAL
Qty: 120 TABLET | Refills: 2 | Status: SHIPPED | OUTPATIENT
Start: 2021-07-12 | End: 2021-10-08

## 2021-07-27 RX ORDER — LEVOTHYROXINE SODIUM 0.12 MG/1
TABLET ORAL
Qty: 90 TABLET | Refills: 1 | Status: SHIPPED | OUTPATIENT
Start: 2021-07-27 | End: 2022-01-12 | Stop reason: SDUPTHER

## 2021-08-30 ENCOUNTER — HOSPITAL ENCOUNTER (OUTPATIENT)
Age: 36
Discharge: HOME | End: 2021-08-30
Payer: COMMERCIAL

## 2021-08-30 ENCOUNTER — TELEPHONE (OUTPATIENT)
Dept: ENDOCRINOLOGY | Age: 36
End: 2021-08-30

## 2021-08-30 DIAGNOSIS — Z79.4 TYPE 2 DIABETES MELLITUS WITH DIABETIC POLYNEUROPATHY, WITH LONG-TERM CURRENT USE OF INSULIN (HCC): ICD-10-CM

## 2021-08-30 DIAGNOSIS — E11.42 TYPE 2 DIABETES MELLITUS WITH DIABETIC POLYNEUROPATHY, WITH LONG-TERM CURRENT USE OF INSULIN (HCC): ICD-10-CM

## 2021-08-30 DIAGNOSIS — Z79.4 TYPE 2 DIABETES MELLITUS WITH DIABETIC POLYNEUROPATHY, WITH LONG-TERM CURRENT USE OF INSULIN (HCC): Primary | ICD-10-CM

## 2021-08-30 DIAGNOSIS — E11.42 TYPE 2 DIABETES MELLITUS WITH DIABETIC POLYNEUROPATHY, WITH LONG-TERM CURRENT USE OF INSULIN (HCC): Primary | ICD-10-CM

## 2021-08-30 DIAGNOSIS — E55.9 VITAMIN D DEFICIENCY: ICD-10-CM

## 2021-08-30 LAB
A/G RATIO: 1.4 (ref 1.1–2.2)
ALBUMIN SERPL-MCNC: 4.3 G/DL (ref 3.4–5)
ALP BLD-CCNC: 61 U/L (ref 40–129)
ALT SERPL-CCNC: 57 U/L (ref 10–40)
ANION GAP SERPL CALCULATED.3IONS-SCNC: 16 MMOL/L (ref 3–16)
AST SERPL-CCNC: 34 U/L (ref 15–37)
BILIRUB SERPL-MCNC: 0.3 MG/DL (ref 0–1)
BUN BLDV-MCNC: 9 MG/DL (ref 7–20)
CALCIUM SERPL-MCNC: 9.5 MG/DL (ref 8.3–10.6)
CHLORIDE BLD-SCNC: 101 MMOL/L (ref 99–110)
CHOLESTEROL, TOTAL: 176 MG/DL (ref 0–199)
CO2: 21 MMOL/L (ref 21–32)
CREAT SERPL-MCNC: 0.9 MG/DL (ref 0.9–1.3)
CREATININE URINE: 230.2 MG/DL (ref 39–259)
GFR AFRICAN AMERICAN: >60
GFR NON-AFRICAN AMERICAN: >60
GLOBULIN: 3.1 G/DL
GLUCOSE BLD-MCNC: 152 MG/DL (ref 70–99)
HDLC SERPL-MCNC: 32 MG/DL (ref 40–60)
LDL CHOLESTEROL CALCULATED: 105 MG/DL
MICROALBUMIN UR-MCNC: 46.6 MG/DL
MICROALBUMIN/CREAT UR-RTO: 202.4 MG/G (ref 0–30)
POTASSIUM SERPL-SCNC: 4.4 MMOL/L (ref 3.5–5.1)
SODIUM BLD-SCNC: 138 MMOL/L (ref 136–145)
TOTAL PROTEIN: 7.4 G/DL (ref 6.4–8.2)
TRIGL SERPL-MCNC: 196 MG/DL (ref 0–150)
TSH SERPL DL<=0.05 MIU/L-ACNC: 1.31 UIU/ML (ref 0.27–4.2)
VITAMIN D 25-HYDROXY: 40.6 NG/ML
VLDLC SERPL CALC-MCNC: 39 MG/DL

## 2021-08-30 PROCEDURE — 82043 UR ALBUMIN QUANTITATIVE: CPT

## 2021-08-30 PROCEDURE — 84443 ASSAY THYROID STIM HORMONE: CPT

## 2021-08-30 PROCEDURE — 36415 COLL VENOUS BLD VENIPUNCTURE: CPT

## 2021-08-30 PROCEDURE — 82570 ASSAY OF URINE CREATININE: CPT

## 2021-08-30 PROCEDURE — 80061 LIPID PANEL: CPT

## 2021-08-30 PROCEDURE — 83036 HEMOGLOBIN GLYCOSYLATED A1C: CPT

## 2021-08-30 PROCEDURE — 80053 COMPREHEN METABOLIC PANEL: CPT

## 2021-08-30 PROCEDURE — 82306 VITAMIN D 25 HYDROXY: CPT

## 2021-08-30 NOTE — TELEPHONE ENCOUNTER
PT is currently at the lab to have his lab work done prior to his appt on 9/1. PT's mother states that all the lab orders that he needs to have done are not in epic. She would like to have a call regarding what labs the PT needs. Call and advise.

## 2021-08-31 LAB
ESTIMATED AVERAGE GLUCOSE: 177.2 MG/DL
HBA1C MFR BLD: 7.8 %

## 2021-09-01 ENCOUNTER — OFFICE VISIT (OUTPATIENT)
Dept: ENDOCRINOLOGY | Age: 36
End: 2021-09-01
Payer: COMMERCIAL

## 2021-09-01 VITALS
BODY MASS INDEX: 36.45 KG/M2 | HEIGHT: 78 IN | DIASTOLIC BLOOD PRESSURE: 76 MMHG | SYSTOLIC BLOOD PRESSURE: 128 MMHG | WEIGHT: 315 LBS | RESPIRATION RATE: 16 BRPM | HEART RATE: 111 BPM

## 2021-09-01 DIAGNOSIS — Z79.4 TYPE 2 DIABETES MELLITUS WITH DIABETIC POLYNEUROPATHY, WITH LONG-TERM CURRENT USE OF INSULIN (HCC): Primary | ICD-10-CM

## 2021-09-01 DIAGNOSIS — E78.2 MIXED HYPERLIPIDEMIA: ICD-10-CM

## 2021-09-01 DIAGNOSIS — E11.42 TYPE 2 DIABETES MELLITUS WITH DIABETIC POLYNEUROPATHY, WITH LONG-TERM CURRENT USE OF INSULIN (HCC): Primary | ICD-10-CM

## 2021-09-01 DIAGNOSIS — E55.9 VITAMIN D DEFICIENCY: ICD-10-CM

## 2021-09-01 PROCEDURE — 3051F HG A1C>EQUAL 7.0%<8.0%: CPT | Performed by: INTERNAL MEDICINE

## 2021-09-01 PROCEDURE — 99214 OFFICE O/P EST MOD 30 MIN: CPT | Performed by: INTERNAL MEDICINE

## 2021-09-01 NOTE — PROGRESS NOTES
Flynn Jeronimo is a 37 y.o. male who follows in endocrine clinic for uncontrolled Type 2 diabetes mellitus, ess hypertension ,hyperlipidemia, hypothyroidism  and  severe obesity . He was  diagnosed with diabetes at age 17 years he was started on Oral pills metfromin for years and then he was switched to insulin he was on 70/30 combination LYNNETTE is a high function autistic adult , he was  followed at [facility] in the past , and he has a twin brother who is a diabetic too. Pt apparently lives with his mom but still fixes his own meals and skips his insulin doses and doesn't follow a diabetic diet and doesn't exercise much either ---Mom is a diabetic too but she lets Terrance Lama make his own decision and according to mom she has noticed a lot of noncompliance on pt's part . He is also noted to have Vit d level was only 5 initially   He also has hypertension and is on meds   He was diagnoseed with ADHD and follows with his PCP   He  lost 40 lbs in summer 2016 and his finger stick blood glucose  improved , he gained  the weight back   Pt also has hyperlipidemia and is on medication    INTERIM:    Diabetes  He presents for his follow-up diabetic visit. He has type 2 diabetes mellitus. No MedicAlert identification noted. The initial diagnosis of diabetes was made 18 years ago. His disease course has been stable. Hypoglycemia symptoms include nervousness/anxiousness and sweats. Pertinent negatives for hypoglycemia include no speech difficulty or tremors. Associated symptoms include fatigue and weakness. There are no hypoglycemic complications. Pertinent negatives for hypoglycemia complications include no required glucagon injection. Symptoms are stable. Diabetic complications include nephropathy. Risk factors for coronary artery disease include diabetes mellitus, dyslipidemia, hypertension and male sex. Current diabetic treatment includes intensive insulin program. He is compliant with treatment some of the time.  He is 1CC/31GX5/16\") 31G X 5/16\" 1 ML MISC 1 each by Does not apply route daily 100 Syringe 6    metFORMIN (GLUCOPHAGE-XR) 500 MG extended release tablet 2 TABLETS 2 TIMES A  tablet 2    HUMULIN R 500 UNIT/ML concentrated injection vial INJECT 175 UNITS(DRAW UP TO 35 UNIT LINE ON U-100 SYRINGE) THREE TIMES A DAY **MAY REFILL** 20 mL 5    lisinopril-hydroCHLOROthiazide (PRINZIDE;ZESTORETIC) 10-12.5 MG per tablet 1 TABLET ONCE DAILY (NEW RX OR MD NEEDS TO BE CONTACTED FOR MORE REFILLS) 30 tablet 5    Icosapent Ethyl (VASCEPA) 1 g CAPS capsule 2 CAPSULES BY MOUTH 2 TIMES A DAY (PRESCRIBER OK IS NEEDED FOR NEXT FILL) 120 capsule 2    blood glucose test strips (FREESTYLE LITE) strip USE TO CHECK BLOOD SUGAR 6 TIMES DAILY 600 strip 5    vitamin D (ERGOCALCIFEROL) 1.25 MG (54728 UT) CAPS capsule TAKE 1 CAPSULE (50,000 UNITS TOTAL) BY MOUTH ONCE A WEEK. (PRESCRIBER OK IS NEEDED FOR NEXT FILL) 4 capsule 5    levothyroxine (SYNTHROID) 125 MCG tablet Take 1 tablet by mouth daily 90 tablet 1    Insulin Syringe-Needle U-100 (TRUEPLUS INSULIN SYRINGE) 31G X 5/16\" 0.5 ML MISC USE WITH INSULIN THREE TIMES A DAY **MAY REFILL** 300 each 5    Lancets MISC Use 4 times a day      traZODone (DESYREL) 100 MG tablet TAKE 1 TO 2 TABLETS BY MOUTH AT BEDTIME **MAY REFILL** **INCREASE IN DIRECTIONS**      venlafaxine (EFFEXOR XR) 150 MG extended release capsule Take 150 mg by mouth      Liraglutide (VICTOZA) 18 MG/3ML SOPN SC injection Inject 1.8mg daily into the skin 3 pen 3     No current facility-administered medications for this visit. Allergies   Allergen Reactions    Amoxicillin      Bladder infection       ROS   I have reviewed the review of system questionnaire filled by the patient .   Patient was advised to contact PCP for non endocrine signs and symptoms         OBJECTIVE:  /70   Pulse 103   Resp 16   Ht 6' 7\" (2.007 m)   Wt (!) 401 lb (181.9 kg)   BMI 45.17 kg/m²    Wt Readings from Last 3 Encounters: 05/05/21 (!) 401 lb (181.9 kg)   01/29/20 (!) 385 lb (174.6 kg)   08/08/19 (!) 382 lb (173.3 kg)       Constitutional: no acute distress, well appearing, well nourished  Psychiatric: oriented to person, place and time, judgement, insight and normal, recent and remote memory and intact and mood, affect are normal  Skin: skin and subcutaneous tissue is normal without mass,   Head and Face: examination of head and face revealed no abnormalities  Eyes: no lid or conjunctival swelling, no erythema or discharge, pupils are normal,   Ears/Nose: external inspection of ears and nose revealed no abnormalities, hearing is grossly normal  Oropharynx/Mouth/Face: lips, tongue and gums are normal with no lesions, the voice quality was normal  Neck: neck is supple and symmetric, with midline trachea and no masses, thyroid is normal    Pulmonary: no increased work of breathing or signs of respiratory distress, lungs are clear to auscultation  Cardiovascular: normal heart rate and rhythm, normal S1 and S2,   Musculoskeletal: normal gait and station,   Neurological: normal coordination, normal general cortical function      Lab Results   Component Value Date    LABA1C 8.6 05/03/2021    LABA1C 8.1 01/19/2021    LABA1C 8.8 01/24/2020         ASSESSMENT/PLAN:      Uncontrolled TYPE 2 DIABETES WITH COMPLICATIONS - Microalbuminuria --nephropathy a1c 7.5>>8.8>>7.8>>8.2>>8.8>>8.1>>7.8     LYNNETTE is on U 500 insulin he has been taking 55 units TID  via insulin syringe  ---   He will reduce the dinner dose to 50  units   as he is having some fasting hypoglycemia --Discussed freestyle thiago again he is not interested    On metformin 1000 twice a day   On Victoza 1.8  mg daily I also discussed weekly GLP-1 agonist he likes to stay on current  Today again we discussed dietary modification and the need to change eating habits   Hypoglycemia protocol reviewed in detail with patient Patient was advised to carry glucose tablets and also have glucagon emergency kit. Provided with RX for glucagon. Patient was advised that sending of his fingerstick blood glucose logs is crucial in management of his diabetes. We will adjust his dose of insulin according to sent data. Health maintenance   -advised to have annual dilated eye exam 2018 no retinopathy   - microalb/creat ratio was elevated in  april 2019  --saw Dr Mia Malone in the past, most recent value still elevated at 125  -foot exam performed  follows with Podiatry Dr Jose Carlos Colmenares every 3 month ---  Dec 2020       -for his buffalo hump I did MSCx 2 which came back abnl I am not positive if he even did at the right time saw her in jan 2018 .  -1 mg dex suppression test was normal cortisol was <0.1.   --- repeat 24 hr urine cortisol in August 2019 was again normal    ---Fungal Groin infection   Improved    Still has not seen a urologist.  Hernando Sinning to do so     Hypothyroidism   On 112 mcg daily tsh 1.31 in august 2021   ---on Lt4 125 mcg daily and recheck labs at follow up in 3 months   --Patient has been taking his medication regularly he denies any use of iron or calcium with his thyroid medication   Pt appears clinically and biochemically euthyroid on current dose of Levothyroxine , will continue on same dose. Pt was advised to avoid taking Levothyroxine with concomitant Iron and calcium containing supplements and try taking 1/2 hr before eating.    --thyroid uls was normal 8/2014   his Abs were negative.      Elevated LFTS now getting better   usg showed fatty liver   Discussed the need to follow up with PCP   Discussed weight loss in detail     Autism   stable     Hypertension   He is on lisinopril /hct   Not checked recently but denies any headaches or chestpain     Hypertriglyceridemia   He has been taking Lovaza 4 gram daily and have repeat labs in 3 months   LDL 85>>62 -Tgs 289>>330>> 199>>196 in aug 2021   He has  been taking  Statins  regularly last LDL level was 92 >>105 in aug 2021     VIT D DEF levels improved from 5 ----24 >>26 >>28>>40   Advised to take 5000 iu dialy   advised to increase sun exposure     OBesity   Advised to step up his diet and exercise. He lost 20 lbs in summer 2016   I had a lengthy discussion with the patient regarding caloric restriction as well as stepping up exercise. We discussed caloric goal in detail. Also discussed with patient the utility of mobile phone apps like \" my fitness Pal \" or \"Lose It \". Patient verbalized understanding and agrees to work on this aspect. he has no stigma of  Cushing disease or untreated thyroid disorder. Previous workup was negative     Depression   On effexor and welbutrin as per PCP      Reviewed and/or ordered clinical lab results Yes  Reviewed and/or ordered radiology tests Yes  Reviewed and/or ordered other diagnostic tests Yes  Made a decision to obtain old records Yes  Reviewed and summarized old records Yes      Pina Parsons was counseled regarding symptoms of current diagnosis, course and complications of disease if inadequately treated, side effects of medications, diagnosis, treatment options, and prognosis, risks, benefits, complications, and alternatives of treatment, labs, imaging and other studies and treatment targets and goals. He understands instructions and counseling. These diagnosis were discussed and reviewed with the patient including the advantages of drug therapy. He was counseled at this visit on the following: diabetes complication prevention and foot care. Return in about 3 months (around 8/5/2021).

## 2021-09-20 ENCOUNTER — TELEPHONE (OUTPATIENT)
Dept: ENDOCRINOLOGY | Age: 36
End: 2021-09-20

## 2021-09-20 DIAGNOSIS — E11.42 TYPE 2 DIABETES MELLITUS WITH DIABETIC POLYNEUROPATHY, WITH LONG-TERM CURRENT USE OF INSULIN (HCC): Primary | ICD-10-CM

## 2021-09-20 DIAGNOSIS — Z79.4 TYPE 2 DIABETES MELLITUS WITH DIABETIC POLYNEUROPATHY, WITH LONG-TERM CURRENT USE OF INSULIN (HCC): Primary | ICD-10-CM

## 2021-09-20 NOTE — TELEPHONE ENCOUNTER
Called the pt's mother to inform her that they need to check with the insurance to make the request is covered     The pt's mother was not available

## 2021-09-21 RX ORDER — LANCETS 30 GAUGE
1 EACH MISCELLANEOUS DAILY
Qty: 100 EACH | Refills: 3 | Status: SHIPPED | OUTPATIENT
Start: 2021-09-21

## 2021-09-21 RX ORDER — BLOOD-GLUCOSE METER
1 EACH MISCELLANEOUS DAILY
Qty: 1 KIT | Refills: 0 | Status: SHIPPED | OUTPATIENT
Start: 2021-09-21

## 2021-09-21 RX ORDER — GLUCOSAMINE HCL/CHONDROITIN SU 500-400 MG
CAPSULE ORAL
Qty: 100 STRIP | Refills: 3 | Status: SHIPPED | OUTPATIENT
Start: 2021-09-21 | End: 2021-09-23

## 2021-09-21 NOTE — TELEPHONE ENCOUNTER
Spoke to the pt's mother and she spoke to the insurance provider that stated that the One Touch 1 was covered by his insurance     Supplies and kit ordered

## 2022-01-06 ENCOUNTER — HOSPITAL ENCOUNTER (OUTPATIENT)
Age: 37
Discharge: HOME | End: 2022-01-06
Payer: COMMERCIAL

## 2022-01-06 DIAGNOSIS — E55.9 VITAMIN D DEFICIENCY: ICD-10-CM

## 2022-01-06 DIAGNOSIS — E78.2 MIXED HYPERLIPIDEMIA: ICD-10-CM

## 2022-01-06 DIAGNOSIS — E11.42 TYPE 2 DIABETES MELLITUS WITH DIABETIC POLYNEUROPATHY, WITH LONG-TERM CURRENT USE OF INSULIN (HCC): ICD-10-CM

## 2022-01-06 DIAGNOSIS — Z79.4 TYPE 2 DIABETES MELLITUS WITH DIABETIC POLYNEUROPATHY, WITH LONG-TERM CURRENT USE OF INSULIN (HCC): ICD-10-CM

## 2022-01-06 LAB
A/G RATIO: 1.5 (ref 1.1–2.2)
ALBUMIN SERPL-MCNC: 4.6 G/DL (ref 3.4–5)
ALP BLD-CCNC: 70 U/L (ref 40–129)
ALT SERPL-CCNC: 40 U/L (ref 10–40)
ANION GAP SERPL CALCULATED.3IONS-SCNC: 16 MMOL/L (ref 3–16)
AST SERPL-CCNC: 28 U/L (ref 15–37)
BILIRUB SERPL-MCNC: 0.3 MG/DL (ref 0–1)
BUN BLDV-MCNC: 17 MG/DL (ref 7–20)
CALCIUM SERPL-MCNC: 9.2 MG/DL (ref 8.3–10.6)
CHLORIDE BLD-SCNC: 98 MMOL/L (ref 99–110)
CHOLESTEROL, TOTAL: 178 MG/DL (ref 0–199)
CO2: 22 MMOL/L (ref 21–32)
CREAT SERPL-MCNC: 1.2 MG/DL (ref 0.9–1.3)
CREATININE URINE: 297 MG/DL (ref 39–259)
GFR AFRICAN AMERICAN: >60
GFR NON-AFRICAN AMERICAN: >60
GLUCOSE BLD-MCNC: 322 MG/DL (ref 70–99)
HDLC SERPL-MCNC: 33 MG/DL (ref 40–60)
LDL CHOLESTEROL CALCULATED: 100 MG/DL
MICROALBUMIN UR-MCNC: 25.8 MG/DL
MICROALBUMIN/CREAT UR-RTO: 86.9 MG/G (ref 0–30)
POTASSIUM SERPL-SCNC: 4.8 MMOL/L (ref 3.5–5.1)
SODIUM BLD-SCNC: 136 MMOL/L (ref 136–145)
TOTAL PROTEIN: 7.7 G/DL (ref 6.4–8.2)
TRIGL SERPL-MCNC: 226 MG/DL (ref 0–150)
TSH SERPL DL<=0.05 MIU/L-ACNC: 3.06 UIU/ML (ref 0.27–4.2)
VLDLC SERPL CALC-MCNC: 45 MG/DL

## 2022-01-06 PROCEDURE — 80053 COMPREHEN METABOLIC PANEL: CPT

## 2022-01-06 PROCEDURE — 83036 HEMOGLOBIN GLYCOSYLATED A1C: CPT

## 2022-01-06 PROCEDURE — 82570 ASSAY OF URINE CREATININE: CPT

## 2022-01-06 PROCEDURE — 82043 UR ALBUMIN QUANTITATIVE: CPT

## 2022-01-06 PROCEDURE — 36415 COLL VENOUS BLD VENIPUNCTURE: CPT

## 2022-01-06 PROCEDURE — 80061 LIPID PANEL: CPT

## 2022-01-06 PROCEDURE — 84443 ASSAY THYROID STIM HORMONE: CPT

## 2022-01-07 LAB
ESTIMATED AVERAGE GLUCOSE: 194.4 MG/DL
HBA1C MFR BLD: 8.4 %

## 2022-01-12 ENCOUNTER — OFFICE VISIT (OUTPATIENT)
Dept: ENDOCRINOLOGY | Age: 37
End: 2022-01-12
Payer: COMMERCIAL

## 2022-01-12 VITALS
HEART RATE: 98 BPM | BODY MASS INDEX: 45.06 KG/M2 | HEIGHT: 78 IN | RESPIRATION RATE: 16 BRPM | DIASTOLIC BLOOD PRESSURE: 76 MMHG | SYSTOLIC BLOOD PRESSURE: 134 MMHG

## 2022-01-12 DIAGNOSIS — I10 ESSENTIAL HYPERTENSION: ICD-10-CM

## 2022-01-12 DIAGNOSIS — E03.8 HYPOTHYROIDISM DUE TO HASHIMOTO'S THYROIDITIS: ICD-10-CM

## 2022-01-12 DIAGNOSIS — Z79.4 TYPE 2 DIABETES MELLITUS WITH DIABETIC POLYNEUROPATHY, WITH LONG-TERM CURRENT USE OF INSULIN (HCC): ICD-10-CM

## 2022-01-12 DIAGNOSIS — E06.3 HYPOTHYROIDISM DUE TO HASHIMOTO'S THYROIDITIS: ICD-10-CM

## 2022-01-12 DIAGNOSIS — E78.2 MIXED HYPERLIPIDEMIA: ICD-10-CM

## 2022-01-12 DIAGNOSIS — E11.42 TYPE 2 DIABETES MELLITUS WITH DIABETIC POLYNEUROPATHY, WITH LONG-TERM CURRENT USE OF INSULIN (HCC): ICD-10-CM

## 2022-01-12 PROCEDURE — 2022F DILAT RTA XM EVC RTNOPTHY: CPT | Performed by: INTERNAL MEDICINE

## 2022-01-12 PROCEDURE — A4660 SPHYG/BP APP W CUFF AND STET: HCPCS | Performed by: INTERNAL MEDICINE

## 2022-01-12 PROCEDURE — 3052F HG A1C>EQUAL 8.0%<EQUAL 9.0%: CPT | Performed by: INTERNAL MEDICINE

## 2022-01-12 PROCEDURE — G8484 FLU IMMUNIZE NO ADMIN: HCPCS | Performed by: INTERNAL MEDICINE

## 2022-01-12 PROCEDURE — 99214 OFFICE O/P EST MOD 30 MIN: CPT | Performed by: INTERNAL MEDICINE

## 2022-01-12 PROCEDURE — G8417 CALC BMI ABV UP PARAM F/U: HCPCS | Performed by: INTERNAL MEDICINE

## 2022-01-12 PROCEDURE — 1036F TOBACCO NON-USER: CPT | Performed by: INTERNAL MEDICINE

## 2022-01-12 PROCEDURE — G8427 DOCREV CUR MEDS BY ELIG CLIN: HCPCS | Performed by: INTERNAL MEDICINE

## 2022-01-12 RX ORDER — LIRAGLUTIDE 6 MG/ML
INJECTION SUBCUTANEOUS
Qty: 9 ML | Refills: 3 | Status: SHIPPED | OUTPATIENT
Start: 2022-01-12 | End: 2022-05-26

## 2022-01-12 RX ORDER — HYDROCHLOROTHIAZIDE 25 MG/1
TABLET ORAL
COMMUNITY
Start: 2021-12-27 | End: 2022-08-25

## 2022-01-12 RX ORDER — CALCIUM CARB/VITAMIN D3/VIT K1 500-100-40
TABLET,CHEWABLE ORAL
Qty: 100 EACH | Refills: 5 | Status: SHIPPED | OUTPATIENT
Start: 2022-01-12 | End: 2022-08-29

## 2022-01-12 RX ORDER — SYRINGE,INSUL U-500,NDL,0.5ML 31GX15/64"
SYRINGE, EMPTY DISPOSABLE MISCELLANEOUS
Qty: 100 EACH | Refills: 3 | Status: SHIPPED | OUTPATIENT
Start: 2022-01-12 | End: 2022-05-25 | Stop reason: SDUPTHER

## 2022-01-12 RX ORDER — BLOOD SUGAR DIAGNOSTIC
STRIP MISCELLANEOUS
Qty: 300 STRIP | Refills: 5 | Status: SHIPPED | OUTPATIENT
Start: 2022-01-12 | End: 2022-05-25 | Stop reason: SDUPTHER

## 2022-01-12 RX ORDER — ICOSAPENT ETHYL 1000 MG/1
CAPSULE ORAL
Qty: 120 CAPSULE | Refills: 5 | Status: SHIPPED | OUTPATIENT
Start: 2022-01-12 | End: 2022-08-03

## 2022-01-12 RX ORDER — LOSARTAN POTASSIUM AND HYDROCHLOROTHIAZIDE 12.5; 5 MG/1; MG/1
1 TABLET ORAL DAILY
Qty: 90 TABLET | Refills: 1 | Status: SHIPPED | OUTPATIENT
Start: 2022-01-12 | End: 2022-07-05

## 2022-01-12 RX ORDER — INSULIN HUMAN 500 [IU]/ML
INJECTION, SOLUTION SUBCUTANEOUS
Qty: 20 ML | Refills: 5 | Status: SHIPPED | OUTPATIENT
Start: 2022-01-12 | End: 2022-04-25

## 2022-01-12 RX ORDER — LEVOTHYROXINE SODIUM 0.12 MG/1
TABLET ORAL
Qty: 90 TABLET | Refills: 1 | Status: SHIPPED | OUTPATIENT
Start: 2022-01-12 | End: 2022-02-10 | Stop reason: SDUPTHER

## 2022-01-12 NOTE — PROGRESS NOTES
Elizabeth Brown is a 37 y.o. male who follows in endocrine clinic for uncontrolled Type 2 diabetes mellitus, ess hypertension ,hyperlipidemia, hypothyroidism  and  severe obesity . He was  diagnosed with diabetes at age 17 years he was started on Oral pills metfromin for years and then he was switched to insulin he was on 70/30 combination LYNNETTE is a high function autistic adult , he was  followed at [facility] in the past , and he has a twin brother who is a diabetic too. Pt  lives with his mom but still fixes his own meals and skips his insulin doses occasionally and doesn't follow a diabetic diet and doesn't exercise much either ---Mom is a diabetic too but she lets Neel Ramirez make his own decision and according to mom she has noticed a lot of noncompliance on pt's part . He is also noted to have Vit d level was only 5 initially   He also has hypertension and is on meds   He was diagnoseed with ADHD and follows with his PCP   He  lost 40 lbs in summer 2016 and his finger stick blood glucose  improved , he gained  the weight back   Pt also has hyperlipidemia and is on medication    INTERIM:    Diabetes  He presents for his follow-up diabetic visit. He has type 2 diabetes mellitus. No MedicAlert identification noted. The initial diagnosis of diabetes was made 18 years ago. His disease course has been stable. Hypoglycemia symptoms include nervousness/anxiousness and sweats. Pertinent negatives for hypoglycemia include no speech difficulty or tremors. Associated symptoms include fatigue and weakness. There are no hypoglycemic complications. Pertinent negatives for hypoglycemia complications include no required glucagon injection. Symptoms are stable. Diabetic complications include nephropathy. Risk factors for coronary artery disease include diabetes mellitus, dyslipidemia, hypertension and male sex. Current diabetic treatment includes intensive insulin program. He is compliant with treatment some of the time.  He is following a high fat/cholesterol diet. When asked about meal planning, he reported none. He has not had a previous visit with a dietitian. He rarely participates in exercise. There is no change in his home blood glucose trend. His breakfast blood glucose is taken after 10 am. His breakfast blood glucose range is generally 110-130 mg/dl. An ACE inhibitor/angiotensin II receptor blocker is being taken. He sees a podiatrist.Eye exam is current. Denies any hypoglycemia, admits to noncompliance        Prior visit with dietician: yes  Current diet: on average, 3 meals per day  Current exercise: rare    Has there been any hospitalization, surgery or major illness since the last visit? No   Has there been any new school, family or social problems since the last visit? No  Has there been any new family history of members with diabetes, heart disease, stroke, or endocrine related problems since the last visit?   No    Past Medical History:   Diagnosis Date    ADHD (attention deficit hyperactivity disorder)     Autism     Chicken pox     CKD (chronic kidney disease)     Depression     Down syndrome     Flu 03/06/2019    Hx: UTI (urinary tract infection)     Hyperlipidemia     Hyperlipidemia 8/23/2018    Hypertension     Hypothyroidism     Insulin resistance 2/5/2018    Microalbuminuria     Obesity     Type 2 diabetes mellitus with diabetic polyneuropathy, with long-term current use of insulin ([address] 2/5/2018    Type 2 diabetes mellitus without complication (HCC)     Vitamin D deficiency       Patient Active Problem List   Diagnosis    Allergic rhinitis    Allergy to mold spores    Attention deficit hyperactivity disorder (ADHD)    CKD (chronic kidney disease) stage 2, GFR 60-89 ml/min    Down syndrome    Essential hypertension    Hypothyroidism    Obesity    Pes cavus    Vitamin D deficiency    Type 2 diabetes mellitus with diabetic polyneuropathy, with long-term current use of insulin (HCC)  Insulin resistance    Hyperlipidemia     Past Surgical History:   Procedure Laterality Date    APPENDECTOMY      TYMPANOSTOMY TUBE PLACEMENT       Social History     Socioeconomic History    Marital status: Single     Spouse name: Not on file    Number of children: Not on file    Years of education: Not on file    Highest education level: Not on file   Occupational History    Not on file   Social Needs    Financial resource strain: Not on file    Food insecurity     Worry: Not on file     Inability: Not on file    Transportation needs     Medical: Not on file     Non-medical: Not on file   Tobacco Use    Smoking status: Never Smoker    Smokeless tobacco: Never Used   Substance and Sexual Activity    Alcohol use: No    Drug use: No    Sexual activity: Not on file   Lifestyle    Physical activity     Days per week: Not on file     Minutes per session: Not on file    Stress: Not on file   Relationships    Social connections     Talks on phone: Not on file     Gets together: Not on file     Attends Baptism service: Not on file     Active member of club or organization: Not on file     Attends meetings of clubs or organizations: Not on file     Relationship status: Not on file    Intimate partner violence     Fear of current or ex partner: Not on file     Emotionally abused: Not on file     Physically abused: Not on file     Forced sexual activity: Not on file   Other Topics Concern    Not on file   Social History Narrative    Not on file     Family History   Problem Relation Age of Onset    Diabetes Mother     High Blood Pressure Mother     High Blood Pressure Father     Other Maternal Grandmother         lymphoma    Heart Disease Maternal Grandfather      Current Outpatient Medications   Medication Sig Dispense Refill    Insulin Syringe-Needle U-100 (INSULIN SYRINGE 1CC/31GX5/16\") 31G X 5/16\" 1 ML MISC 1 each by Does not apply route daily 100 Syringe 6    metFORMIN (GLUCOPHAGE-XR) 500 MG extended release tablet 2 TABLETS 2 TIMES A  tablet 2    HUMULIN R 500 UNIT/ML concentrated injection vial INJECT 175 UNITS(DRAW UP TO 35 UNIT LINE ON U-100 SYRINGE) THREE TIMES A DAY **MAY REFILL** 20 mL 5    lisinopril-hydroCHLOROthiazide (PRINZIDE;ZESTORETIC) 10-12.5 MG per tablet 1 TABLET ONCE DAILY (NEW RX OR MD NEEDS TO BE CONTACTED FOR MORE REFILLS) 30 tablet 5    Icosapent Ethyl (VASCEPA) 1 g CAPS capsule 2 CAPSULES BY MOUTH 2 TIMES A DAY (PRESCRIBER OK IS NEEDED FOR NEXT FILL) 120 capsule 2    blood glucose test strips (FREESTYLE LITE) strip USE TO CHECK BLOOD SUGAR 6 TIMES DAILY 600 strip 5    vitamin D (ERGOCALCIFEROL) 1.25 MG (33731 UT) CAPS capsule TAKE 1 CAPSULE (50,000 UNITS TOTAL) BY MOUTH ONCE A WEEK. (PRESCRIBER OK IS NEEDED FOR NEXT FILL) 4 capsule 5    levothyroxine (SYNTHROID) 125 MCG tablet Take 1 tablet by mouth daily 90 tablet 1    Insulin Syringe-Needle U-100 (TRUEPLUS INSULIN SYRINGE) 31G X 5/16\" 0.5 ML MISC USE WITH INSULIN THREE TIMES A DAY **MAY REFILL** 300 each 5    Lancets MISC Use 4 times a day      traZODone (DESYREL) 100 MG tablet TAKE 1 TO 2 TABLETS BY MOUTH AT BEDTIME **MAY REFILL** **INCREASE IN DIRECTIONS**      venlafaxine (EFFEXOR XR) 150 MG extended release capsule Take 150 mg by mouth      Liraglutide (VICTOZA) 18 MG/3ML SOPN SC injection Inject 1.8mg daily into the skin 3 pen 3     No current facility-administered medications for this visit. Allergies   Allergen Reactions    Amoxicillin      Bladder infection       ROS   I have reviewed the review of system questionnaire filled by the patient .   Patient was advised to contact PCP for non endocrine signs and symptoms         OBJECTIVE:  /70   Pulse 103   Resp 16   Ht 6' 7\" (2.007 m)   Wt (!) 401 lb (181.9 kg)   BMI 45.17 kg/m²    Wt Readings from Last 3 Encounters:   05/05/21 (!) 401 lb (181.9 kg)   01/29/20 (!) 385 lb (174.6 kg)   08/08/19 (!) 382 lb (173.3 kg)       Constitutional: no acute distress, well appearing, well nourished  Psychiatric: oriented to person, place and time, judgement, insight and normal, recent and remote memory and intact and mood, affect are normal  Skin: skin and subcutaneous tissue is normal without mass,   Head and Face: examination of head and face revealed no abnormalities  Eyes: no lid or conjunctival swelling, no erythema or discharge, pupils are normal,   Ears/Nose: external inspection of ears and nose revealed no abnormalities, hearing is grossly normal  Oropharynx/Mouth/Face: lips, tongue and gums are normal with no lesions, the voice quality was normal  Neck: neck is supple and symmetric, with midline trachea and no masses, thyroid is nonpalpable due to body habitus  Pulmonary: no increased work of breathing or signs of respiratory distress, lungs are clear to auscultation  Cardiovascular: normal heart rate and rhythm, normal S1 and S2,   Musculoskeletal: normal gait and station,   Neurological: normal coordination, normal general cortical function      Lab Results   Component Value Date    LABA1C 8.6 05/03/2021    LABA1C 8.1 01/19/2021    LABA1C 8.8 01/24/2020         ASSESSMENT/PLAN:      Uncontrolled TYPE 2 DIABETES WITH COMPLICATIONS - Microalbuminuria --nephropathy a1c 7.5>>8.8>>7.8>>8.2>>8.8>>8.1>>7.8     LYNNETTE is on U 500 insulin he has been taking 55 units TID  via insulin syringe  --- sent prescription for U5 100 syringes. --Discussed [facility] again he is not interested    On metformin 1000 twice a day   On Victoza 1.8  mg daily I also discussed weekly GLP-1 agonist he likes to stay on current  Today again we discussed dietary modification and the need to change eating habits   Hypoglycemia protocol reviewed in detail with patient Patient was advised to carry glucose tablets and also have glucagon emergency kit. Provided with RX for glucagon.    Patient was advised that sending of his fingerstick blood glucose logs is crucial in management of his diabetes. We will adjust his dose of insulin according to sent data. Health maintenance   -advised to have annual dilated eye exam 2018 no retinopathy   - microalb/creat ratio was elevated in  april 2019  --saw Dr Arian Steen in the past, most recent value still elevated at 125  -foot exam performed  follows with Podiatry Dr Luna Form every 3 month ---  Dec 2020       -for his buffalo hump I did MSCx 2 which came back abnl I am not positive if he even did at the right time saw her in jan 2018 .  -1 mg dex suppression test was normal cortisol was <0.1.   --- repeat 24 hr urine cortisol in August 2019 was again normal    Hypothyroidism   On 112 mcg daily tsh 1.31 in august 2021   ---on Lt4 125 mcg daily and recheck labs at follow up in 3 months   --Patient has been taking his medication regularly he denies any use of iron or calcium with his thyroid medication   Pt appears clinically and biochemically euthyroid on current dose of Levothyroxine , will continue on same dose. Pt was advised to avoid taking Levothyroxine with concomitant Iron and calcium containing supplements and try taking 1/2 hr before eating.    --thyroid uls was normal 8/2014   his Abs were negative. Elevated LFTS now getting better   usg showed fatty liver   Discussed the need to follow up with PCP   Discussed weight loss in detail     Autism   stable     Hypertension   He is on lisinopril /hct   He has been c/o  dry coughing so will stop Lisinopril and switch to losartan /HCTZ combo     Hypertriglyceridemia   He has been taking Vascepa 4 gram daily and have repeat labs in 3 months   LDL 85>>62 -Tgs 289>>330>> 199>>196 in aug 2021   He has  been taking  Statins  regularly last LDL level was 92 >>105 in aug 2021     VIT D DEF   levels improved from 5 ----24 >>26 >>28>>40   Advised to take 5000 iu dialy   advised to increase sun exposure     OBesity   Advised to step up his diet and exercise.   He lost 20 lbs in summer 2016   I had a lengthy discussion with the patient regarding caloric restriction as well as stepping up exercise. We discussed caloric goal in detail. Also discussed with patient the utility of mobile phone apps like \" my fitness Pal \" or \"Lose It \". Patient verbalized understanding and agrees to work on this aspect. he has no stigma of  Cushing disease or untreated thyroid disorder. Previous workup was negative     Depression   On effexor and welbutrin as per PCP      Reviewed and/or ordered clinical lab results Yes  Reviewed and/or ordered radiology tests Yes  Reviewed and/or ordered other diagnostic tests Yes  Made a decision to obtain old records Yes  Reviewed and summarized old records Yes      [facility] was counseled regarding symptoms of current diagnosis, course and complications of disease if inadequately treated, side effects of medications, diagnosis, treatment options, and prognosis, risks, benefits, complications, and alternatives of treatment, labs, imaging and other studies and treatment targets and goals. He understands instructions and counseling. These diagnosis were discussed and reviewed with the patient including the advantages of drug therapy. He was counseled at this visit on the following: diabetes complication prevention and foot care. Return in about 3 months (around 8/5/2021).

## 2022-01-13 ENCOUNTER — TELEPHONE (OUTPATIENT)
Dept: ENDOCRINOLOGY | Age: 37
End: 2022-01-13

## 2022-01-13 NOTE — TELEPHONE ENCOUNTER
Submitted PA for OneTouch Ultra strips  Key: [id number]  Via CM STATUS: Your PA has been resolved, no additional PA is required.

## 2022-01-13 NOTE — TELEPHONE ENCOUNTER
Submitted PA for HumuLIN R U-500 (CONCENTRATED) 500UNIT/ML solution Key: [id number] - Rx #: [id number] Via CMM STATUS: APPROVED

## 2022-02-10 DIAGNOSIS — E03.8 HYPOTHYROIDISM DUE TO HASHIMOTO'S THYROIDITIS: ICD-10-CM

## 2022-02-10 DIAGNOSIS — E06.3 HYPOTHYROIDISM DUE TO HASHIMOTO'S THYROIDITIS: ICD-10-CM

## 2022-02-10 RX ORDER — LEVOTHYROXINE SODIUM 0.12 MG/1
TABLET ORAL
Qty: 90 TABLET | Refills: 1 | Status: SHIPPED | OUTPATIENT
Start: 2022-02-10 | End: 2022-08-04

## 2022-02-10 NOTE — TELEPHONE ENCOUNTER
Call from [facility] asking about the rejection on the refill request for Levothyroxine being to soon.  They state pt is out of his medication    CB# for [id number] [facility] [phone]

## 2022-02-23 ENCOUNTER — TELEPHONE (OUTPATIENT)
Dept: ENDOCRINOLOGY | Age: 37
End: 2022-02-23

## 2022-04-04 DIAGNOSIS — Z79.4 TYPE 2 DIABETES MELLITUS WITH DIABETIC POLYNEUROPATHY, WITH LONG-TERM CURRENT USE OF INSULIN (HCC): ICD-10-CM

## 2022-04-04 DIAGNOSIS — E11.42 TYPE 2 DIABETES MELLITUS WITH DIABETIC POLYNEUROPATHY, WITH LONG-TERM CURRENT USE OF INSULIN (HCC): ICD-10-CM

## 2022-04-04 RX ORDER — METFORMIN HYDROCHLORIDE 500 MG/1
TABLET, EXTENDED RELEASE ORAL
Qty: 120 TABLET | Refills: 2 | Status: SHIPPED | OUTPATIENT
Start: 2022-04-04 | End: 2022-07-05

## 2022-04-25 DIAGNOSIS — E11.42 TYPE 2 DIABETES MELLITUS WITH DIABETIC POLYNEUROPATHY, WITH LONG-TERM CURRENT USE OF INSULIN (HCC): ICD-10-CM

## 2022-04-25 DIAGNOSIS — Z79.4 TYPE 2 DIABETES MELLITUS WITH DIABETIC POLYNEUROPATHY, WITH LONG-TERM CURRENT USE OF INSULIN (HCC): ICD-10-CM

## 2022-04-25 RX ORDER — INSULIN HUMAN 500 [IU]/ML
INJECTION, SOLUTION SUBCUTANEOUS
Qty: 40 ML | Refills: 2 | Status: SHIPPED | OUTPATIENT
Start: 2022-04-25 | End: 2022-07-18

## 2022-05-19 DIAGNOSIS — E78.2 MIXED HYPERLIPIDEMIA: ICD-10-CM

## 2022-05-19 DIAGNOSIS — E11.42 TYPE 2 DIABETES MELLITUS WITH DIABETIC POLYNEUROPATHY, WITH LONG-TERM CURRENT USE OF INSULIN (HCC): ICD-10-CM

## 2022-05-19 DIAGNOSIS — E06.3 HYPOTHYROIDISM DUE TO HASHIMOTO'S THYROIDITIS: ICD-10-CM

## 2022-05-19 DIAGNOSIS — Z79.4 TYPE 2 DIABETES MELLITUS WITH DIABETIC POLYNEUROPATHY, WITH LONG-TERM CURRENT USE OF INSULIN (HCC): ICD-10-CM

## 2022-05-19 DIAGNOSIS — E03.8 HYPOTHYROIDISM DUE TO HASHIMOTO'S THYROIDITIS: ICD-10-CM

## 2022-05-19 LAB
A/G RATIO: 1.7 (ref 1.1–2.2)
ALBUMIN SERPL-MCNC: 4.2 G/DL (ref 3.4–5)
ALP BLD-CCNC: 59 U/L (ref 40–129)
ALT SERPL-CCNC: 47 U/L (ref 10–40)
ANION GAP SERPL CALCULATED.3IONS-SCNC: 14 MMOL/L (ref 3–16)
AST SERPL-CCNC: 24 U/L (ref 15–37)
BILIRUB SERPL-MCNC: <0.2 MG/DL (ref 0–1)
BUN BLDV-MCNC: 12 MG/DL (ref 7–20)
CALCIUM SERPL-MCNC: 9.1 MG/DL (ref 8.3–10.6)
CHLORIDE BLD-SCNC: 100 MMOL/L (ref 99–110)
CHOLESTEROL, TOTAL: 197 MG/DL (ref 0–199)
CO2: 27 MMOL/L (ref 21–32)
CREAT SERPL-MCNC: 1 MG/DL (ref 0.9–1.3)
CREATININE URINE: 256.1 MG/DL (ref 39–259)
GFR AFRICAN AMERICAN: >60
GFR NON-AFRICAN AMERICAN: >60
GLUCOSE BLD-MCNC: 179 MG/DL (ref 70–99)
HDLC SERPL-MCNC: 33 MG/DL (ref 40–60)
LDL CHOLESTEROL CALCULATED: 114 MG/DL
MICROALBUMIN UR-MCNC: 32.8 MG/DL
MICROALBUMIN/CREAT UR-RTO: 128.1 MG/G (ref 0–30)
POTASSIUM SERPL-SCNC: 4.4 MMOL/L (ref 3.5–5.1)
SODIUM BLD-SCNC: 141 MMOL/L (ref 136–145)
TOTAL PROTEIN: 6.7 G/DL (ref 6.4–8.2)
TRIGL SERPL-MCNC: 248 MG/DL (ref 0–150)
TSH SERPL DL<=0.05 MIU/L-ACNC: 2.39 UIU/ML (ref 0.27–4.2)
VLDLC SERPL CALC-MCNC: 50 MG/DL

## 2022-05-20 LAB
ESTIMATED AVERAGE GLUCOSE: 182.9 MG/DL
HBA1C MFR BLD: 8 %

## 2022-05-25 ENCOUNTER — OFFICE VISIT (OUTPATIENT)
Dept: ENDOCRINOLOGY | Age: 37
End: 2022-05-25
Payer: COMMERCIAL

## 2022-05-25 VITALS
HEART RATE: 99 BPM | WEIGHT: 315 LBS | HEIGHT: 78 IN | SYSTOLIC BLOOD PRESSURE: 130 MMHG | TEMPERATURE: 98 F | RESPIRATION RATE: 14 BRPM | DIASTOLIC BLOOD PRESSURE: 74 MMHG | BODY MASS INDEX: 36.45 KG/M2

## 2022-05-25 DIAGNOSIS — E11.42 TYPE 2 DIABETES MELLITUS WITH DIABETIC POLYNEUROPATHY, WITH LONG-TERM CURRENT USE OF INSULIN (HCC): ICD-10-CM

## 2022-05-25 DIAGNOSIS — I10 ESSENTIAL HYPERTENSION: ICD-10-CM

## 2022-05-25 DIAGNOSIS — E03.8 HYPOTHYROIDISM DUE TO HASHIMOTO'S THYROIDITIS: Primary | ICD-10-CM

## 2022-05-25 DIAGNOSIS — Z79.4 TYPE 2 DIABETES MELLITUS WITH DIABETIC POLYNEUROPATHY, WITH LONG-TERM CURRENT USE OF INSULIN (HCC): ICD-10-CM

## 2022-05-25 DIAGNOSIS — E06.3 HYPOTHYROIDISM DUE TO HASHIMOTO'S THYROIDITIS: Primary | ICD-10-CM

## 2022-05-25 PROCEDURE — G8427 DOCREV CUR MEDS BY ELIG CLIN: HCPCS | Performed by: INTERNAL MEDICINE

## 2022-05-25 PROCEDURE — 3052F HG A1C>EQUAL 8.0%<EQUAL 9.0%: CPT | Performed by: INTERNAL MEDICINE

## 2022-05-25 PROCEDURE — 2022F DILAT RTA XM EVC RTNOPTHY: CPT | Performed by: INTERNAL MEDICINE

## 2022-05-25 PROCEDURE — 1036F TOBACCO NON-USER: CPT | Performed by: INTERNAL MEDICINE

## 2022-05-25 PROCEDURE — 99214 OFFICE O/P EST MOD 30 MIN: CPT | Performed by: INTERNAL MEDICINE

## 2022-05-25 PROCEDURE — G8417 CALC BMI ABV UP PARAM F/U: HCPCS | Performed by: INTERNAL MEDICINE

## 2022-05-25 RX ORDER — PEN NEEDLE, DIABETIC 31 GX5/16"
1 NEEDLE, DISPOSABLE MISCELLANEOUS DAILY
Qty: 100 EACH | Refills: 3 | Status: SHIPPED | OUTPATIENT
Start: 2022-05-25

## 2022-05-25 RX ORDER — BLOOD SUGAR DIAGNOSTIC
STRIP MISCELLANEOUS
Qty: 300 STRIP | Refills: 5 | Status: SHIPPED | OUTPATIENT
Start: 2022-05-25

## 2022-05-25 RX ORDER — SYRINGE,INSUL U-500,NDL,0.5ML 31GX15/64"
SYRINGE, EMPTY DISPOSABLE MISCELLANEOUS
Qty: 100 EACH | Refills: 3 | Status: SHIPPED | OUTPATIENT
Start: 2022-05-25

## 2022-05-25 NOTE — PROGRESS NOTES
Terrence Reid is a 37 y.o. male who follows in endocrine clinic for uncontrolled Type 2 diabetes mellitus, ess hypertension ,hyperlipidemia, hypothyroidism  and  severe obesity . He was  diagnosed with diabetes at age 17 years he was started on Oral pills metfromin for years and then he was switched to insulin he was on 70/30 combination LYNNETTE is a high function autistic adult , he was  followed at [facility] in the past , and he has a twin brother who is a diabetic too. Pt  lives with his mom but still fixes his own meals and skips his insulin doses occasionally and doesn't follow a diabetic diet and doesn't exercise much either ---Mom is a diabetic too but she lets Svetlana Dooley make his own decision and according to mom she has noticed a lot of noncompliance on pt's part . He is also noted to have Vit d level was only 5 initially   He also has hypertension and is on meds   He was diagnoseed with ADHD and follows with his PCP   He  lost 40 lbs in summer 2016 and his finger stick blood glucose  improved , he gained  the weight back   Pt also has hyperlipidemia and is on medication    INTERIM:    Diabetes  He presents for his follow-up diabetic visit. He has type 2 diabetes mellitus. No MedicAlert identification noted. The initial diagnosis of diabetes was made 18 years ago. His disease course has been stable. Hypoglycemia symptoms include nervousness/anxiousness and sweats. Pertinent negatives for hypoglycemia include no speech difficulty or tremors. Associated symptoms include fatigue and weakness. There are no hypoglycemic complications. Pertinent negatives for hypoglycemia complications include no required glucagon injection. Symptoms are stable. Diabetic complications include nephropathy. Risk factors for coronary artery disease include diabetes mellitus, dyslipidemia, hypertension and male sex. Current diabetic treatment includes intensive insulin program. He is compliant with treatment some of the time.  He is following a high fat/cholesterol diet. When asked about meal planning, he reported none. He has not had a previous visit with a dietitian. He rarely participates in exercise. There is no change in his home blood glucose trend. His breakfast blood glucose is taken after 10 am. His breakfast blood glucose range is generally 110-130 mg/dl. An ACE inhibitor/angiotensin II receptor blocker is being taken. He sees a podiatrist.Eye exam is current. Denies any hypoglycemia, admits to noncompliance        Prior visit with dietician: yes  Current diet: on average, 3 meals per day  Current exercise: rare    Has there been any hospitalization, surgery or major illness since the last visit? No   Has there been any new school, family or social problems since the last visit? No  Has there been any new family history of members with diabetes, heart disease, stroke, or endocrine related problems since the last visit?   No    Past Medical History:   Diagnosis Date    ADHD (attention deficit hyperactivity disorder)     Autism     Chicken pox     CKD (chronic kidney disease)     Depression     Down syndrome     Flu 03/06/2019    Hx: UTI (urinary tract infection)     Hyperlipidemia     Hyperlipidemia 8/23/2018    Hypertension     Hypothyroidism     Insulin resistance 2/5/2018    Microalbuminuria     Obesity     Type 2 diabetes mellitus with diabetic polyneuropathy, with long-term current use of insulin ([address] 2/5/2018    Type 2 diabetes mellitus without complication (HCC)     Vitamin D deficiency       Patient Active Problem List   Diagnosis    Allergic rhinitis    Allergy to mold spores    Attention deficit hyperactivity disorder (ADHD)    CKD (chronic kidney disease) stage 2, GFR 60-89 ml/min    Down syndrome    Essential hypertension    Hypothyroidism    Obesity    Pes cavus    Vitamin D deficiency    Type 2 diabetes mellitus with diabetic polyneuropathy, with long-term current use of insulin (HCC)  Insulin resistance    Hyperlipidemia     Past Surgical History:   Procedure Laterality Date    APPENDECTOMY      TYMPANOSTOMY TUBE PLACEMENT       Social History     Socioeconomic History    Marital status: Single     Spouse name: Not on file    Number of children: Not on file    Years of education: Not on file    Highest education level: Not on file   Occupational History    Not on file   Social Needs    Financial resource strain: Not on file    Food insecurity     Worry: Not on file     Inability: Not on file    Transportation needs     Medical: Not on file     Non-medical: Not on file   Tobacco Use    Smoking status: Never Smoker    Smokeless tobacco: Never Used   Substance and Sexual Activity    Alcohol use: No    Drug use: No    Sexual activity: Not on file   Lifestyle    Physical activity     Days per week: Not on file     Minutes per session: Not on file    Stress: Not on file   Relationships    Social connections     Talks on phone: Not on file     Gets together: Not on file     Attends Tenriism service: Not on file     Active member of club or organization: Not on file     Attends meetings of clubs or organizations: Not on file     Relationship status: Not on file    Intimate partner violence     Fear of current or ex partner: Not on file     Emotionally abused: Not on file     Physically abused: Not on file     Forced sexual activity: Not on file   Other Topics Concern    Not on file   Social History Narrative    Not on file     Family History   Problem Relation Age of Onset    Diabetes Mother     High Blood Pressure Mother     High Blood Pressure Father     Other Maternal Grandmother         lymphoma    Heart Disease Maternal Grandfather      Current Outpatient Medications   Medication Sig Dispense Refill    Insulin Syringe-Needle U-100 (INSULIN SYRINGE 1CC/31GX5/16\") 31G X 5/16\" 1 ML MISC 1 each by Does not apply route daily 100 Syringe 6    metFORMIN (GLUCOPHAGE-XR) 500 MG extended release tablet 2 TABLETS 2 TIMES A  tablet 2    HUMULIN R 500 UNIT/ML concentrated injection vial INJECT 175 UNITS(DRAW UP TO 35 UNIT LINE ON U-100 SYRINGE) THREE TIMES A DAY **MAY REFILL** 20 mL 5    lisinopril-hydroCHLOROthiazide (PRINZIDE;ZESTORETIC) 10-12.5 MG per tablet 1 TABLET ONCE DAILY (NEW RX OR MD NEEDS TO BE CONTACTED FOR MORE REFILLS) 30 tablet 5    Icosapent Ethyl (VASCEPA) 1 g CAPS capsule 2 CAPSULES BY MOUTH 2 TIMES A DAY (PRESCRIBER OK IS NEEDED FOR NEXT FILL) 120 capsule 2    blood glucose test strips (FREESTYLE LITE) strip USE TO CHECK BLOOD SUGAR 6 TIMES DAILY 600 strip 5    vitamin D (ERGOCALCIFEROL) 1.25 MG (69351 UT) CAPS capsule TAKE 1 CAPSULE (50,000 UNITS TOTAL) BY MOUTH ONCE A WEEK. (PRESCRIBER OK IS NEEDED FOR NEXT FILL) 4 capsule 5    levothyroxine (SYNTHROID) 125 MCG tablet Take 1 tablet by mouth daily 90 tablet 1    Insulin Syringe-Needle U-100 (TRUEPLUS INSULIN SYRINGE) 31G X 5/16\" 0.5 ML MISC USE WITH INSULIN THREE TIMES A DAY **MAY REFILL** 300 each 5    Lancets MISC Use 4 times a day      traZODone (DESYREL) 100 MG tablet TAKE 1 TO 2 TABLETS BY MOUTH AT BEDTIME **MAY REFILL** **INCREASE IN DIRECTIONS**      venlafaxine (EFFEXOR XR) 150 MG extended release capsule Take 150 mg by mouth      Liraglutide (VICTOZA) 18 MG/3ML SOPN SC injection Inject 1.8mg daily into the skin 3 pen 3     No current facility-administered medications for this visit. Allergies   Allergen Reactions    Amoxicillin      Bladder infection       ROS   I have reviewed the review of system questionnaire filled by the patient .   Patient was advised to contact PCP for non endocrine signs and symptoms         OBJECTIVE:  Vitals:    05/25/22 1119   BP: 130/74   Pulse: 99   Resp: 14   Temp: 98 °F (36.7 °C)       Wt Readings from Last 3 Encounters:   05/25/22 (!) 395 lb (179.2 kg)   09/01/21 (!) 400 lb (181.4 kg)   05/05/21 (!) 401 lb (181.9 kg)     Constitutional: no acute distress, well appearing, well nourished  Psychiatric: oriented to person, place and time, judgement, insight and normal, recent and remote memory and intact and mood, affect are normal  Skin: skin and subcutaneous tissue is normal without mass,   Head and Face: examination of head and face revealed no abnormalities  Eyes: no lid or conjunctival swelling, no erythema or discharge, pupils are normal,   Ears/Nose: external inspection of ears and nose revealed no abnormalities, hearing is grossly normal  Oropharynx/Mouth/Face: lips, tongue and gums are normal with no lesions, the voice quality was normal  Neck: neck is supple and symmetric, with midline trachea and no masses, thyroid is nonpalpable due to body habitus  Pulmonary: no increased work of breathing or signs of respiratory distress, lungs are clear to auscultation  Cardiovascular: normal heart rate and rhythm, normal S1 and S2,   Musculoskeletal: normal gait and station,   Neurological: normal coordination, normal general cortical function      Lab Results   Component Value Date    LABA1C 8.6 05/03/2021    LABA1C 8.1 01/19/2021    LABA1C 8.8 01/24/2020         ASSESSMENT/PLAN:      Uncontrolled TYPE 2 DIABETES WITH COMPLICATIONS - Microalbuminuria --nephropathy a1c 7.5>>8.8>>7.8>>8.2>>8.8>>8.1>>7.8     LYNNETTE is on U 500 insulin he has been taking 55---70  units TID  via insulin syringe  --- sent prescription for U500 syringes. --Discussed kiara John again he is not interested    On metformin 1000 twice a day   On Victoza 1.8  mg daily will switch to weekly [employer] sent   Today again we discussed dietary modification and the need to change eating habits   Hypoglycemia protocol reviewed in detail with patient Patient was advised to carry glucose tablets and also have glucagon emergency kit. Provided with RX for glucagon. Patient was advised that sending of his fingerstick blood glucose logs is crucial in management of his diabetes.  We will adjust his dose of exercise. We discussed caloric goal in detail. Also discussed with patient the utility of mobile phone apps like \" my fitness Pal \" or \"Lose It \". Patient verbalized understanding and agrees to work on this aspect. he has no stigma of  Cushing disease or untreated thyroid disorder. Previous workup was negative     Depression   On effexor and welbutrin as per PCP      Reviewed and/or ordered clinical lab results Yes  Reviewed and/or ordered radiology tests Yes  Reviewed and/or ordered other diagnostic tests Yes  Made a decision to obtain old records Yes  Reviewed and summarized old records Yes      Emilia Lopez was counseled regarding symptoms of current diagnosis, course and complications of disease if inadequately treated, side effects of medications, diagnosis, treatment options, and prognosis, risks, benefits, complications, and alternatives of treatment, labs, imaging and other studies and treatment targets and goals. He understands instructions and counseling. These diagnosis were discussed and reviewed with the patient including the advantages of drug therapy. He was counseled at this visit on the following: diabetes complication prevention and foot care. Return in about 3 months (around 8/5/2021).

## 2022-05-25 NOTE — PATIENT INSTRUCTIONS
Patient Education        empagliflozin  Pronunciation: EM pa gli FLOE zin  Brand: [place]  What is the most important information I should know about empagliflozin? Stop taking this medicine and call your doctor at once if you have signs of a serious side effect, such as stomach pain, vomiting, tiredness, or trouble breathing. Tell your doctor if you are sick with vomiting or diarrhea, or if you eat ordrink less than usual.  Empagliflozin can cause serious infections around the penis or vagina. Get medical help right away if you have burning, itching, odor, discharge, pain, tenderness, redness or swelling of the genital or rectal area, fever, orif you don't feel well. What is empagliflozin? Empagliflozin is used together with diet and exercise to improve blood sugarcontrol in adults with type 2 diabetes mellitus. Empagliflozin is also used to lower the risk of death from heart attack, stroke, or heart failure in adults with type 2 diabetes who also have heartdisease. Empagliflozin is also used to lower the risk of dying or needing to be in ahospital for heart failure when your heart cannot pump blood properly. Empagliflozin is not for treating type 1 diabetes. Empagliflozin may also be used for purposes not listed in this medication guide. What should I discuss with my healthcare provider before taking empagliflozin? You should not use empagliflozin if you are allergic to it, or if you have:   severe kidney disease (or if you are on dialysis). Tell your doctor if you have ever had:   liver or kidney disease;   a bladder infection or urination problems;   a genital infection (penis or vagina);   problems with your pancreas, including surgery;   alcoholism, or if you currently drink large amounts of alcohol;   if you are on a low salt diet; or   if you are 65 or older. Follow your doctor's instructions about using this medicine if you are pregnant or you become pregnant.  Controlling diabetes is very important during pregnancy. You should not use empagliflozin during the second or third trimester of pregnancy. Do not breastfeed. Not approved for use by anyone younger than 25years old. How should I take empagliflozin? Follow all directions on your prescription label and read all medication guides or instruction sheets. Your doctor may occasionally change your dose. Use themedicine exactly as directed. You may take empagliflozin with or without food. Your blood sugar will need to be checked often, and you may also need to test the level of ketones in your urine. Empagliflozin can cause life-threatening ketoacidosis (too much acid in the blood). Even if your blood sugar is normal, contact your doctor if a urine test showsthat you have high ketones in the urine. Blood sugar can be affected by stress, illness, surgery, exercise, alcohol use,or skipping meals. Low blood sugar (hypoglycemia) can make you feel very hungry, dizzy, irritable, or shaky. To quickly treat hypoglycemia, eat or drink hard candy, crackers, raisins, fruit juice, or non-diet soda. Your doctor may prescribe glucagon injection in case of severehypoglycemia. You may get dehydrated during prolonged illness. Call your doctor if you aresick with vomiting or diarrhea, or if you eat or drink less than usual.  This medicine can affect the results of certain medical tests. Tell any doctorwho treats you that you are using empagliflozin. Your treatment may also include diet, exercise, weight control, and specialmedical care. Store at room temperature away from moisture and heat. What happens if I miss a dose? Take the medicine as soon as you can, but skip the missed dose if it is almost time for your next dose. Do not take two doses at one time. What happens if I overdose? Seek emergency medical attention or call the Poison Help line at 1-313.270.5717. What should I avoid while taking empagliflozin? Avoid drinking alcohol.   Avoid getting up too fast from a sitting or lying position, or you may feeldizzy. What are the possible side effects of empagliflozin? Get emergency medical help if you have signs of an allergic reaction: hives; trouble swallowing, difficult breathing; swelling of your face, lips,tongue, or throat. Seek medical attention right away if you have signs of a serious genital infection (penis or vagina): burning, itching, odor, discharge, pain, tenderness, redness or swelling of the genital or rectal area, fever, not feeling well. These symptoms may get worse quickly. Stop taking this medicine and call your doctor at once if you have:   a light-headed feeling, like you might pass out;   dehydration --dizziness, confusion, feeling very thirsty, less urination;   ketoacidosis (too much acid in the blood) --nausea, vomiting, stomach pain, confusion, unusual drowsiness, or trouble breathing; or   signs of a bladder infection --pain or burning when you urinate, blood in your urine, pain in pelvis or back. Some side effects may be more likely to occur in older adults. Common side effects may include:   a bladder infection; or   yeast infection in women (vaginal itching or discharge). This is not a complete list of side effects and others may occur. Call your doctor for medical advice about side effects. You may report side effects toFDA at 3-019-LHB-7761. What other drugs will affect empagliflozin? Other drugs may increase or decrease the effects of empagliflozin on lowering your blood sugar. Tell your doctor about all your current medicines and any you start or stopusing, especially:   insulin, or other oral diabetes medicine; or   a diuretic or \"water pill. \"  This list is not complete. Other drugs may affect empagliflozin, including prescription and over-the-counter medicines, vitamins, and herbal products. Notall possible drug interactions are listed here. Where can I get more information?   Your pharmacist can provide more information about empagliflozin. Remember, keep this and all other medicines out of the reach of children, never share your medicines with others, and use this medication only for the indication prescribed. Every effort has been made to ensure that the information provided by [address] is accurate, up-to-date, and complete, but no guarantee is made to that effect. Drug information contained herein may be time sensitive. [facility] information has been compiled for use by healthcare practitioners and consumers in the [facility] and therefore [facility] does not warrant that uses outside of the [facility] are appropriate, unless specifically indicated otherwise. [facility]'s drug information does not endorse drugs, diagnose patients or recommend therapy. [facility]'s drug information is an informational resource designed to assist licensed healthcare practitioners in caring for their patients and/or to serve consumers viewing this service as a supplement to, and not a substitute for, the expertise, skill, knowledge and judgment of healthcare practitioners. The absence of a warning for a given drug or drug combination in no way should be construed to indicate that the drug or drug combination is safe, effective or appropriate for any given patient. [facility] does not assume any responsibility for any aspect of healthcare administered with the aid of information [facility] provides. The information contained herein is not intended to cover all possible uses, directions, precautions, warnings, drug interactions, allergic reactions, or adverse effects. If you have questions about the drugs you are taking, check with yourdoctor, nurse or pharmacist.  Copyright 1130-3028 [address] [place] [address] Dr SEXTON. Version: 4.01. Revision date: 10/7/2021. Care instructions adapted under license by [facility] ([facility]).  If you have questions about a medical condition or this instruction, always ask your healthcare professional. [employer] disclaims any warranty or liability for your use of this information.

## 2022-05-26 ENCOUNTER — TELEPHONE (OUTPATIENT)
Dept: ENDOCRINOLOGY | Age: 37
End: 2022-05-26

## 2022-05-26 NOTE — TELEPHONE ENCOUNTER
I switch this patient to once weekly Ozempic since he was already taking Victoza so he can start with 1 mg weekly dose of Ozempic this was discussed with the patient.   We did not discuss Wegovy-

## 2022-05-26 NOTE — TELEPHONE ENCOUNTER
I do not see a script for [facility]. I see one for Semaglutide and Dr. Dorothy Luque notes states she is switching the pt to Ozempic. Please advise if PA needs to be completed. Thanks!

## 2022-07-05 DIAGNOSIS — E55.9 VITAMIN D DEFICIENCY: ICD-10-CM

## 2022-07-05 DIAGNOSIS — Z79.4 TYPE 2 DIABETES MELLITUS WITH DIABETIC POLYNEUROPATHY, WITH LONG-TERM CURRENT USE OF INSULIN (HCC): ICD-10-CM

## 2022-07-05 DIAGNOSIS — E11.42 TYPE 2 DIABETES MELLITUS WITH DIABETIC POLYNEUROPATHY, WITH LONG-TERM CURRENT USE OF INSULIN (HCC): ICD-10-CM

## 2022-07-05 DIAGNOSIS — I10 ESSENTIAL HYPERTENSION: Primary | ICD-10-CM

## 2022-07-05 RX ORDER — METFORMIN HYDROCHLORIDE 500 MG/1
TABLET, EXTENDED RELEASE ORAL
Qty: 120 TABLET | Refills: 2 | Status: SHIPPED | OUTPATIENT
Start: 2022-07-05 | End: 2022-09-26

## 2022-07-05 RX ORDER — LOSARTAN POTASSIUM AND HYDROCHLOROTHIAZIDE 12.5; 5 MG/1; MG/1
TABLET ORAL
Qty: 30 TABLET | Refills: 5 | Status: SHIPPED | OUTPATIENT
Start: 2022-07-05

## 2022-07-05 RX ORDER — ERGOCALCIFEROL 1.25 MG/1
CAPSULE ORAL
Qty: 4 CAPSULE | Refills: 5 | Status: SHIPPED | OUTPATIENT
Start: 2022-07-05

## 2022-07-18 DIAGNOSIS — E11.42 TYPE 2 DIABETES MELLITUS WITH DIABETIC POLYNEUROPATHY, WITH LONG-TERM CURRENT USE OF INSULIN (HCC): ICD-10-CM

## 2022-07-18 DIAGNOSIS — Z79.4 TYPE 2 DIABETES MELLITUS WITH DIABETIC POLYNEUROPATHY, WITH LONG-TERM CURRENT USE OF INSULIN (HCC): ICD-10-CM

## 2022-07-18 RX ORDER — INSULIN HUMAN 500 [IU]/ML
INJECTION, SOLUTION SUBCUTANEOUS
Qty: 40 ML | Refills: 2 | Status: ON HOLD | OUTPATIENT
Start: 2022-07-18 | End: 2022-08-25 | Stop reason: SDUPTHER

## 2022-07-27 ENCOUNTER — TELEPHONE (OUTPATIENT)
Dept: ENDOCRINOLOGY | Age: 37
End: 2022-07-27

## 2022-08-03 DIAGNOSIS — E78.2 MIXED HYPERLIPIDEMIA: ICD-10-CM

## 2022-08-03 RX ORDER — ICOSAPENT ETHYL 1000 MG/1
CAPSULE ORAL
Qty: 120 CAPSULE | Refills: 5 | Status: SHIPPED | OUTPATIENT
Start: 2022-08-03

## 2022-08-03 NOTE — PROGRESS NOTES
[address]  8/15/22  Referring: Dr. Agata Adams CONSULT/CHIEF COMPLAINT/HPI     Reason for visit/ Chief complaint   SOB, lightheadedness/ dizziness    HPI Halle Kennedy is a 31 y. o.referred by his PCP for SOB, lightheadedness and dizziness. PMH uncontrolled Type 2 diabetes mellitus, ess hypertension ,hyperlipidemia, hypothyroidism, ADHD, and severe obesity . He has had about a year of symptoms. He notes that pain isn't severe but has gotten more frequent and is usually associated with shortness of breath. He notes his chest discomfort gets worse with exertion but has sometimes been bad at rest.    He has had diabetes for over 20 years. Patient is adherent with medications and is tolerating them well without side effects     HISTORY/ALLERGIES/ROS     MedHx:  has a past medical history of ADHD (attention deficit hyperactivity disorder), Autism, Chicken pox, CKD (chronic kidney disease), Depression, Flu, Hx: UTI (urinary tract infection), Hyperlipidemia, Hyperlipidemia, Hypertension, Hypothyroidism, Insulin resistance, Microalbuminuria, Obesity, Type 2 diabetes mellitus with diabetic polyneuropathy, with long-term current use of insulin ([address] Type 2 diabetes mellitus without complication ([address] and Vitamin D deficiency. SurgHx:  has a past surgical history that includes Tympanostomy tube placement and Appendectomy. SocHx:  reports that he has never smoked. He has never used smokeless tobacco. He reports current alcohol use. He reports that he does not use drugs. FamHx: Mother had CAD and stents at age 39  Allergies: Amoxicillin     MEDICATIONS      Prior to Admission medications    Medication Sig Start Date End Date Taking? Authorizing Provider   aspirin 81 MG EC tablet Take 81 mg by mouth in the morning. Yes Historical Provider, MD   loratadine (CLARITIN) 10 MG tablet Take 10 mg by mouth in the morning.    Yes Historical Provider, MD   atorvastatin (LIPITOR) 80 MG tablet Take Does not apply route daily 9/21/21  Yes Mark Sage MD   traZODone (DESYREL) 100 MG tablet TAKE 1 TO 2 TABLETS BY MOUTH AT BEDTIME **MAY REFILL** **INCREASE IN DIRECTIONS** 7/10/17  Yes Historical Provider, MD   venlafaxine (EFFEXOR XR) 150 MG extended release capsule Take 150 mg by mouth 12/2/10  Yes Historical Provider, MD   hydroCHLOROthiazide (HYDRODIURIL) 25 MG tablet 1 TABLET BY MOUTH ONCE DAILY **MAY REFILL**  Patient not taking: Reported on 8/15/2022 12/27/21   Historical Provider, MD   lisinopril-hydroCHLOROthiazide (PRINZIDE;ZESTORETIC) 10-12.5 MG per tablet 1 TABLET ONCE DAILY (PRESCRIBER OK IS NEEDED FOR NEXT FILL)  Patient not taking: No sig reported 9/9/21   Mark Sage MD       PHYSICAL EXAM        Vitals:    08/15/22 1144   BP: 122/86   Pulse: (!) 102   SpO2: 96%    Weight: (!) 380 lb 12.8 oz (172.7 kg)     Gen Alert, cooperative, no distress. Very tall. Morbidly obese    Body mass index is 42.9 kg/m². CV Regular rate and rhythm, no murmur   Head Normocephalic, atraumatic, no abnormalities Abd  Soft, NT, +BS, no mass, no OM   Eyes PERRLA, conj/corn clear Ext  Ext nl, AT, no C/C, no edema   Nose Nares normal, no drain age, Non-tender Pulse 2+ and symmetric   Throat Lips, mucosa, tongue normal Skin Color/text/turg nl, no rash/lesions   Neck S/S, TM, NT, no bruit Psych Nl mood and affect   Lung CTA-B, unlabored, no DTP     Ch wall NT, no deform       LABS and Imaging     Relevant and available CV data reviewed    EKG personally interpreted: Sinus tachy otherwise normal    Stress from [facility] reviewed:     Treadmill       1. Myocardial perfusion imaging was performed in conjunction and      will be reported separately. 2. Stress ECG conclusions: [facility] scoring: exercise time of 6min;      maximum ST deviation of 0mm; angina present but did not limit      exercise; resulting score is 2. This score predicts a moderate      risk of cardiac events.      Impressions:  No EKG changes diagnostic for ischemia

## 2022-08-04 DIAGNOSIS — E06.3 HYPOTHYROIDISM DUE TO HASHIMOTO'S THYROIDITIS: ICD-10-CM

## 2022-08-04 DIAGNOSIS — E03.8 HYPOTHYROIDISM DUE TO HASHIMOTO'S THYROIDITIS: ICD-10-CM

## 2022-08-04 RX ORDER — LEVOTHYROXINE SODIUM 0.12 MG/1
TABLET ORAL
Qty: 90 TABLET | Refills: 1 | Status: SHIPPED | OUTPATIENT
Start: 2022-08-04

## 2022-08-15 ENCOUNTER — OFFICE VISIT (OUTPATIENT)
Dept: CARDIOLOGY CLINIC | Age: 37
End: 2022-08-15
Payer: COMMERCIAL

## 2022-08-15 VITALS
HEART RATE: 102 BPM | DIASTOLIC BLOOD PRESSURE: 86 MMHG | SYSTOLIC BLOOD PRESSURE: 122 MMHG | OXYGEN SATURATION: 96 % | BODY MASS INDEX: 36.45 KG/M2 | WEIGHT: 315 LBS | HEIGHT: 78 IN

## 2022-08-15 DIAGNOSIS — Z79.4 TYPE 2 DIABETES MELLITUS WITH DIABETIC POLYNEUROPATHY, WITH LONG-TERM CURRENT USE OF INSULIN (HCC): ICD-10-CM

## 2022-08-15 DIAGNOSIS — I20.8 ANGINAL EQUIVALENT (HCC): ICD-10-CM

## 2022-08-15 DIAGNOSIS — E78.2 MIXED HYPERLIPIDEMIA: ICD-10-CM

## 2022-08-15 DIAGNOSIS — E66.01 CLASS 2 SEVERE OBESITY DUE TO EXCESS CALORIES WITH SERIOUS COMORBIDITY IN ADULT, UNSPECIFIED BMI (HCC): ICD-10-CM

## 2022-08-15 DIAGNOSIS — I10 ESSENTIAL HYPERTENSION: ICD-10-CM

## 2022-08-15 DIAGNOSIS — R94.39 ABNORMAL NUCLEAR STRESS TEST: ICD-10-CM

## 2022-08-15 DIAGNOSIS — E11.42 TYPE 2 DIABETES MELLITUS WITH DIABETIC POLYNEUROPATHY, WITH LONG-TERM CURRENT USE OF INSULIN (HCC): ICD-10-CM

## 2022-08-15 DIAGNOSIS — R42 LIGHTHEADEDNESS: ICD-10-CM

## 2022-08-15 DIAGNOSIS — R06.02 SOB (SHORTNESS OF BREATH): Primary | ICD-10-CM

## 2022-08-15 PROCEDURE — 93000 ELECTROCARDIOGRAM COMPLETE: CPT | Performed by: INTERNAL MEDICINE

## 2022-08-15 PROCEDURE — 99205 OFFICE O/P NEW HI 60 MIN: CPT | Performed by: INTERNAL MEDICINE

## 2022-08-15 RX ORDER — LORATADINE 10 MG/1
10 TABLET ORAL DAILY
COMMUNITY

## 2022-08-15 RX ORDER — ASPIRIN 81 MG/1
81 TABLET ORAL DAILY
COMMUNITY

## 2022-08-15 RX ORDER — ATORVASTATIN CALCIUM 80 MG/1
80 TABLET, FILM COATED ORAL DAILY
Qty: 30 TABLET | Refills: 5 | Status: SHIPPED | OUTPATIENT
Start: 2022-08-15 | End: 2022-10-05

## 2022-08-15 NOTE — PATIENT INSTRUCTIONS
We will get you set up for a heart cath  Start atorvastatin 80 mg daily  Continue aspirin 81 mg daily    Follow up 1 month, sooner if necessary

## 2022-08-18 ENCOUNTER — TELEPHONE (OUTPATIENT)
Dept: ENDOCRINOLOGY | Age: 37
End: 2022-08-18

## 2022-08-19 NOTE — TELEPHONE ENCOUNTER
Fax from [facility]    The request was dismissed due to the following reason.  This drug was forwarded to the appropriate dept to make a coverage decision under your Medicare Part B benefit

## 2022-08-22 NOTE — TELEPHONE ENCOUNTER
Fax from [facility] with approval for test strips. Approval faxed to [phone].

## 2022-08-25 ENCOUNTER — HOSPITAL ENCOUNTER (OUTPATIENT)
Dept: CARDIAC CATH/INVASIVE PROCEDURES | Age: 37
Discharge: HOME | End: 2022-08-25
Attending: INTERNAL MEDICINE | Admitting: INTERNAL MEDICINE
Payer: COMMERCIAL

## 2022-08-25 VITALS
SYSTOLIC BLOOD PRESSURE: 147 MMHG | HEIGHT: 78 IN | DIASTOLIC BLOOD PRESSURE: 85 MMHG | BODY MASS INDEX: 36.45 KG/M2 | RESPIRATION RATE: 18 BRPM | WEIGHT: 315 LBS | HEART RATE: 87 BPM | OXYGEN SATURATION: 99 % | TEMPERATURE: 97.5 F

## 2022-08-25 DIAGNOSIS — I25.10 CORONARY ARTERY DISEASE DUE TO LIPID RICH PLAQUE: Primary | ICD-10-CM

## 2022-08-25 DIAGNOSIS — I25.83 CORONARY ARTERY DISEASE DUE TO LIPID RICH PLAQUE: Primary | ICD-10-CM

## 2022-08-25 DIAGNOSIS — Z79.4 TYPE 2 DIABETES MELLITUS WITH DIABETIC POLYNEUROPATHY, WITH LONG-TERM CURRENT USE OF INSULIN (HCC): ICD-10-CM

## 2022-08-25 DIAGNOSIS — I20.8 ANGINAL EQUIVALENT (HCC): ICD-10-CM

## 2022-08-25 DIAGNOSIS — E11.42 TYPE 2 DIABETES MELLITUS WITH DIABETIC POLYNEUROPATHY, WITH LONG-TERM CURRENT USE OF INSULIN (HCC): ICD-10-CM

## 2022-08-25 DIAGNOSIS — R94.39 ABNORMAL NUCLEAR STRESS TEST: ICD-10-CM

## 2022-08-25 LAB
ANION GAP SERPL CALCULATED.3IONS-SCNC: 11 MMOL/L (ref 3–16)
BUN BLDV-MCNC: 10 MG/DL (ref 7–20)
CALCIUM SERPL-MCNC: 8.6 MG/DL (ref 8.3–10.6)
CHLORIDE BLD-SCNC: 102 MMOL/L (ref 99–110)
CO2: 26 MMOL/L (ref 21–32)
CREAT SERPL-MCNC: 1 MG/DL (ref 0.9–1.3)
EKG ATRIAL RATE: 86 BPM
EKG DIAGNOSIS: NORMAL
EKG P AXIS: 62 DEGREES
EKG P-R INTERVAL: 132 MS
EKG Q-T INTERVAL: 398 MS
EKG QRS DURATION: 94 MS
EKG QTC CALCULATION (BAZETT): 476 MS
EKG R AXIS: 65 DEGREES
EKG T AXIS: 70 DEGREES
EKG VENTRICULAR RATE: 86 BPM
GFR AFRICAN AMERICAN: >60
GFR NON-AFRICAN AMERICAN: >60
GLUCOSE BLD-MCNC: 307 MG/DL (ref 70–99)
HCT VFR BLD CALC: 43.4 % (ref 40.5–52.5)
HEMOGLOBIN: 14.6 G/DL (ref 13.5–17.5)
LEFT VENTRICULAR EJECTION FRACTION MODE: NORMAL
LV EF: 65 %
MCH RBC QN AUTO: 28.8 PG (ref 26–34)
MCHC RBC AUTO-ENTMCNC: 33.6 G/DL (ref 31–36)
MCV RBC AUTO: 85.6 FL (ref 80–100)
PDW BLD-RTO: 12.7 % (ref 12.4–15.4)
PLATELET # BLD: 213 K/UL (ref 135–450)
PMV BLD AUTO: 9.5 FL (ref 5–10.5)
POC ACT LR: 304 SEC
POC ACT LR: 337 SEC
POTASSIUM SERPL-SCNC: 3.9 MMOL/L (ref 3.5–5.1)
RBC # BLD: 5.07 M/UL (ref 4.2–5.9)
SODIUM BLD-SCNC: 139 MMOL/L (ref 136–145)
WBC # BLD: 6.2 K/UL (ref 4–11)

## 2022-08-25 PROCEDURE — 99152 MOD SED SAME PHYS/QHP 5/>YRS: CPT | Performed by: INTERNAL MEDICINE

## 2022-08-25 PROCEDURE — C1887 CATHETER, GUIDING: HCPCS

## 2022-08-25 PROCEDURE — C9600 PERC DRUG-EL COR STENT SING: HCPCS

## 2022-08-25 PROCEDURE — 2709999900 HC NON-CHARGEABLE SUPPLY

## 2022-08-25 PROCEDURE — 93005 ELECTROCARDIOGRAM TRACING: CPT | Performed by: INTERNAL MEDICINE

## 2022-08-25 PROCEDURE — 85027 COMPLETE CBC AUTOMATED: CPT

## 2022-08-25 PROCEDURE — 36415 COLL VENOUS BLD VENIPUNCTURE: CPT

## 2022-08-25 PROCEDURE — 6370000000 HC RX 637 (ALT 250 FOR IP)

## 2022-08-25 PROCEDURE — 93458 L HRT ARTERY/VENTRICLE ANGIO: CPT | Performed by: INTERNAL MEDICINE

## 2022-08-25 PROCEDURE — 92928 PRQ TCAT PLMT NTRAC ST 1 LES: CPT | Performed by: INTERNAL MEDICINE

## 2022-08-25 PROCEDURE — 6360000004 HC RX CONTRAST MEDICATION: Performed by: INTERNAL MEDICINE

## 2022-08-25 PROCEDURE — C1874 STENT, COATED/COV W/DEL SYS: HCPCS

## 2022-08-25 PROCEDURE — 99152 MOD SED SAME PHYS/QHP 5/>YRS: CPT

## 2022-08-25 PROCEDURE — C1894 INTRO/SHEATH, NON-LASER: HCPCS

## 2022-08-25 PROCEDURE — 80048 BASIC METABOLIC PNL TOTAL CA: CPT

## 2022-08-25 PROCEDURE — 93458 L HRT ARTERY/VENTRICLE ANGIO: CPT

## 2022-08-25 PROCEDURE — 6360000002 HC RX W HCPCS

## 2022-08-25 PROCEDURE — C1769 GUIDE WIRE: HCPCS

## 2022-08-25 PROCEDURE — 85347 COAGULATION TIME ACTIVATED: CPT

## 2022-08-25 PROCEDURE — 93010 ELECTROCARDIOGRAM REPORT: CPT | Performed by: INTERNAL MEDICINE

## 2022-08-25 PROCEDURE — 99153 MOD SED SAME PHYS/QHP EA: CPT

## 2022-08-25 PROCEDURE — 2500000003 HC RX 250 WO HCPCS

## 2022-08-25 RX ORDER — SODIUM CHLORIDE 0.9 % (FLUSH) 0.9 %
5-40 SYRINGE (ML) INJECTION PRN
Status: DISCONTINUED | OUTPATIENT
Start: 2022-08-25 | End: 2022-08-26 | Stop reason: HOSPADM

## 2022-08-25 RX ORDER — ACETAMINOPHEN 325 MG/1
650 TABLET ORAL EVERY 4 HOURS PRN
Status: DISCONTINUED | OUTPATIENT
Start: 2022-08-25 | End: 2022-08-26 | Stop reason: HOSPADM

## 2022-08-25 RX ORDER — SODIUM CHLORIDE 9 MG/ML
INJECTION, SOLUTION INTRAVENOUS PRN
Status: DISCONTINUED | OUTPATIENT
Start: 2022-08-25 | End: 2022-08-26 | Stop reason: HOSPADM

## 2022-08-25 RX ORDER — SODIUM CHLORIDE 0.9 % (FLUSH) 0.9 %
5-40 SYRINGE (ML) INJECTION EVERY 12 HOURS SCHEDULED
Status: DISCONTINUED | OUTPATIENT
Start: 2022-08-25 | End: 2022-08-26 | Stop reason: HOSPADM

## 2022-08-25 RX ORDER — CLOPIDOGREL BISULFATE 75 MG/1
75 TABLET ORAL DAILY
Qty: 90 TABLET | Refills: 3 | Status: SHIPPED | OUTPATIENT
Start: 2022-08-25

## 2022-08-25 RX ORDER — SODIUM CHLORIDE 9 MG/ML
INJECTION, SOLUTION INTRAVENOUS CONTINUOUS
Status: DISCONTINUED | OUTPATIENT
Start: 2022-08-25 | End: 2022-08-26 | Stop reason: HOSPADM

## 2022-08-25 RX ORDER — ONDANSETRON 2 MG/ML
4 INJECTION INTRAMUSCULAR; INTRAVENOUS EVERY 6 HOURS PRN
Status: DISCONTINUED | OUTPATIENT
Start: 2022-08-25 | End: 2022-08-26 | Stop reason: HOSPADM

## 2022-08-25 RX ORDER — INSULIN HUMAN 500 [IU]/ML
INJECTION, SOLUTION SUBCUTANEOUS
Qty: 1 ML | Refills: 0 | Status: SHIPPED | OUTPATIENT
Start: 2022-08-25 | End: 2022-10-27

## 2022-08-25 RX ADMIN — IOPAMIDOL 133 ML: 755 INJECTION, SOLUTION INTRAVENOUS at 08:56

## 2022-08-25 NOTE — DISCHARGE INSTRUCTIONS
Radial Angiogram      Care of your puncture site:  Remove clear bandage 24 hours after the procedure. May shower in 24 hours  Inspect the site daily and gently clean using soap and water. Dry thoroughly and apply a Band-Aid. Normal Observations:  Soreness or tenderness which may last one week. Mild oozing from the incision site. Possible bruising that could last 2 weeks. Activity:  You may resume driving 24 hours following the procedure. You may resume normal activity in 3 days or after the wound heals. Avoid lifting more than 10 pounds for 3 days with affected arm. Nutrition:  Regular diet  Drink at least 8 to 10 glasses of decaffeinated, non-alcoholic fluid for the next 24 hours to flush the x-ray dye used for your angiogram out of your body. Call your doctor immediately if your condition worsens, for any other concerns, for a follow-up appointment or if you experience any of the following:  Significant bleeding that does not stop after 10 minutes of applying firm pressure on the puncture site. Increased swelling of the wrist.  Unusual pain, numbness, or tingling of the wrist/arm. Any signs of infection such as: redness, yellow drainage at the site, swelling or pain. Other Instructions:  Hold Metformin or Metformin containing drugs for 48 hours after procedure.

## 2022-08-25 NOTE — H&P
[address]  8/15/22  Referring: Dr. Austin Vega CONSULT/CHIEF COMPLAINT/HPI     Reason for visit/ Chief complaint   SOB, lightheadedness/ dizziness    HPI Haim Figueroa is a 31 y. o.referred by his PCP for SOB, lightheadedness and dizziness. PMH uncontrolled Type 2 diabetes mellitus, ess hypertension ,hyperlipidemia, hypothyroidism, ADHD, and severe obesity . He has had about a year of symptoms. He notes that pain isn't severe but has gotten more frequent and is usually associated with shortness of breath. He notes his chest discomfort gets worse with exertion but has sometimes been bad at rest.    He has had diabetes for over 20 years. Patient is adherent with medications and is tolerating them well without side effects     HISTORY/ALLERGIES/ROS     MedHx:  has a past medical history of ADHD (attention deficit hyperactivity disorder), Autism, Chicken pox, CKD (chronic kidney disease), Depression, Flu, Hx: UTI (urinary tract infection), Hyperlipidemia, Hyperlipidemia, Hypertension, Hypothyroidism, Insulin resistance, Microalbuminuria, Obesity, Type 2 diabetes mellitus with diabetic polyneuropathy, with long-term current use of insulin ([address] Type 2 diabetes mellitus without complication ([facility] [address] and Vitamin D deficiency. SurgHx:  has a past surgical history that includes Tympanostomy tube placement and Appendectomy. SocHx:  reports that he has never smoked. He has never used smokeless tobacco. He reports current alcohol use. He reports that he does not use drugs. FamHx: Mother had CAD and stents at age 39  Allergies: Amoxicillin     MEDICATIONS      Prior to Admission medications    Medication Sig Start Date End Date Taking? Authorizing Provider   aspirin 81 MG EC tablet Take 81 mg by mouth in the morning. Historical Provider, MD   loratadine (CLARITIN) 10 MG tablet Take 10 mg by mouth in the morning.     Historical Provider, MD   atorvastatin (LIPITOR) 80 MG tablet Take 1 tablet by mouth in the morning. 8/15/22   Jaja Baker MD   levothyroxine (SYNTHROID) 125 MCG tablet TAKE ONE TABLET DAILY **NO REFILLS-PRESCRIBER OK NEEDED FOR NEXT REFILL** 8/4/22   Nitin Scott MD   VASCEPA 1 g CAPS capsule 2 CAPSULES BY MOUTH 2 TIMES A DAY (PRESCRIBER OK IS NEEDED FOR NEXT FILL) 8/3/22   Nitin Scott MD   insulin regular human (HUMULIN R) 500 UNIT/ML concentrated injection vial INJECT 275 UNITS(DRAW UP TO 55 UNIT LINE ON U-100 SYRINGE) THREE TIMES A DAY  Patient taking differently: INJECT 275 UNITS(DRAW UP TO 55 UNIT LINE ON U-100 SYRINGE) THREE TIMES A DAY    55 units BID per pt 7/18/22   Nitin Scott MD   metFORMIN (GLUCOPHAGE-XR) 500 MG extended release tablet 2 TABLETS 2 TIMES A DAY (PRESCRIBER OK IS NEEDED FOR NEXT FILL) 7/5/22   Nitin Scott MD   vitamin D (ERGOCALCIFEROL) 1.25 MG (07766 UT) CAPS capsule 1 CAPSULE BY MOUTH ONCE EACH WEEK (PRESCRIBER OK IS NEEDED FOR NEXT FILL) 7/5/22   Nitin Scott MD   losartan-hydroCHLOROthiazide (HYZAAR) 50-12.5 MG per tablet 1 TABLET ONCE DAILY (REPLACES LINSINOPRIL HCTZ) (PRESCRIBER OK IS NEEDED FOR NEXT FILL) 7/5/22   Nitin Scott MD   blood glucose test strips (ONETOUCH ULTRA) strip TEST 6 TIMES A DAY AND AS NEEDED FOR SYMPTOMS OF IRREGULAR BLOOD SUGAR 5/25/22   Nitin Scott MD   Semaglutide, 1 MG/DOSE, 2 MG/1.5ML SOPN 1 mg weekly 5/25/22   Nitin Scott MD   Insulin Pen Needle ([employer] PEN NEEDLES 31G) 31G X 8 MM MISC 1 each by Does not apply route daily 5/25/22   Nitin Scott MD   Insulin Syringe/Needle U-500 (BD INSULIN SYRINGE U-500) 31G X 6MM 0.5 ML MISC Use with U500 insulin TID 5/25/22   Nitin Scott MD   hydroCHLOROthiazide (HYDRODIURIL) 25 MG tablet 1 TABLET BY MOUTH ONCE DAILY **MAY REFILL**  Patient not taking: No sig reported 12/27/21   Historical Provider, MD   Insulin Syringe-Needle U-100 (INSULIN SYRINGE 1CC/31GX5/16\") 31G X 5/16\" 1 ML MISC Use to inject insulin 3 times daily.  1/12/22   Nitin Scott MD   Blood Glucose Monitoring Suppl (ONE TOUCH ULTRA 2) w/Device KIT 1 kit by Does not apply route daily 9/21/21   Priscilla Pizarro MD   Lancets MISC 1 each by Does not apply route daily 9/21/21   Priscilla Pizarro MD   lisinopril-hydroCHLOROthiazide (PRINZIDE;ZESTORETIC) 10-12.5 MG per tablet 1 TABLET ONCE DAILY (PRESCRIBER OK IS NEEDED FOR NEXT FILL)  Patient not taking: No sig reported 9/9/21   Priscilla Pizarro MD   traZODone (DESYREL) 100 MG tablet TAKE 1 TO 2 TABLETS BY MOUTH AT BEDTIME **MAY REFILL** **INCREASE IN DIRECTIONS** 7/10/17   Historical Provider, MD   venlafaxine (EFFEXOR XR) 150 MG extended release capsule Take 150 mg by mouth 12/2/10   Historical Provider, MD       PHYSICAL EXAM        Vitals:    08/25/22 0713   BP: (!) 147/85   Pulse: 87   Resp: 18   Temp: 97.5 °F (36.4 °C)   SpO2: 99%    Weight: (!) 380 lb (172.4 kg)     Gen Alert, cooperative, no distress. Very tall. Morbidly obese    Body mass index is 42.81 kg/m². CV Regular rate and rhythm, no murmur   Head Normocephalic, atraumatic, no abnormalities Abd  Soft, NT, +BS, no mass, no OM   Eyes PERRLA, conj/corn clear Ext  Ext nl, AT, no C/C, no edema   Nose Nares normal, no drain age, Non-tender Pulse 2+ and symmetric   Throat Lips, mucosa, tongue normal Skin Color/text/turg nl, no rash/lesions   Neck S/S, TM, NT, no bruit Psych Nl mood and affect   Lung CTA-B, unlabored, no DTP     Ch wall NT, no deform       LABS and Imaging     Relevant and available CV data reviewed    EKG personally interpreted: Sinus tachy otherwise normal    Stress from [facility] reviewed:     Treadmill       1. Myocardial perfusion imaging was performed in conjunction and      will be reported separately. 2. Stress ECG conclusions: [facility] scoring: exercise time of 6min;      maximum ST deviation of 0mm; angina present but did not limit      exercise; resulting score is 2. This score predicts a moderate      risk of cardiac events. Impressions:  No EKG changes diagnostic for ischemia noted with   exercise. Moderate risk of future CAD events based on [facility]   treadmill score of 2. Nuclear portion    Area of fixed perfusion defect seen in the anteroseptal region with associated wall motion defect is most compatible with prior infarct. However, the should be correlated with patient's clinical history and there is questionable degree of a marginal degree of reperfusion and element of acute ischemia is difficult to exclude. There is resulting mildly diminished ejection fraction. 54% ejection fraction. HighRisk  HighComplexity/Medical Decision Making    Outside/Care everywhere records Reviewed  Labs Reviewed  Prior Imaging, ekg, cath, echo reviewed when available  Medications reviewed  Old Notes reviewed  ASSESSMENT AND PLAN     Shortness of breath/chest discomfort  Most likely anginal equivalent  Given his autism not certain that we can truly ascertain his symptoms  He has an abnormal stress test as well as numerous risk factors  Despite his young age we should go to cath to have a definitive answer    Plan:  Cath  I discussed risks, benefits, and alternatives of heart cath and possible PCI to the patient. I discussed risks, incluiding risk of bleeding, infection, vascular injury, arrhythmia, myocardial infarction, stroke, and even death, and the patient would like to proceed with heart cath. 2.Lightheadedness/dizziness  Plan: pending cath results will do additional workup if needed    3. Essential HTN  BP Today  Hyzaar  Plan: well controlled    4. Hyperlipidemia   5/19/22 ,  HDL 33   Vascepa   LDL goal should be < 70 given DM. Added atorvastatin 80 mg today    5. Type 2 DM/ Insulin resistance   5/19/22 A1c 8.0  Semaglutide, Metformin  Plan. Follow up with Endocrinologist Mata Whelan    6. Morbid Obesity  BMI 44.50  Plan:  Patient counseled on lifestyle modification, diet, and exercise.     Follow Up: 1 month     Murtaza Magaña MD  Cardiologist [facility]    I have reviewed the history and physical and examined the patient and find no relevant changes  I have reviewed with the patient and/or family the risks, benefits, and alternatives to the procedure. Based on these findings I recommend left heart cath for definitive evaluation of coronary arteries. Risks, benefits, expectations, and alternative treatments were discussed. Questions appropriately answered. Sacramentodavis Suh agrees to proceed and verbalized understanding.        Chrystal Scanlon MD 8/25/2022 7:55 AM

## 2022-08-25 NOTE — OP NOTE
Patient:  Igor Solorio   :   1985    Procedural Summary  ~Consent:   Obtained written and verbal consent      Risks/benefits explained in detail  ~Procedure:    Left Heart Catheterization  ~Medications:    Procedural sedation with minimal conscious sedation  ~Complications:   None  ~Blood Loss:    <10cc  ~Specimens:    None obtained  ~Pre-sedation re-evaluation: Performed immediately prior to procedure. Medication and Procedural Reconciliation:  An independent trained observer pushed medications at my direction. We monitored the patient's level of consciousness and vital signs/physiologic status throughout the procedure duration (see start and stop times below). Sedation: 4 mg Versed, 150 mcg Fentanyl  Sedation start: 0755  Sedation stop: 08    Cardiac Cath PCI:  Anatomy:   LM-nml   LAD-prox 50%, mid 80%, distal 90%  Cx-nml  OM- nml  RCA-dom nml  RPDA- nml  LVEF- 60  LVG- 65%  LVEDP- 15    Intervention  ~Successful PCI to LAD with 3.0x38 Bacilio, 3.5x33 BACILIO. Excellent Result. Contrast: 133  Flouro Time: 7.5  Access: R radial a    Impression  ~Coronary Angiography w/ severe single vessel CAD  ~LVG with LVEF of 65 and no regional wall motion abnormalities  ~Successful complex angioplasty and stenting of LAD        Recommendation  ~Aggressive medical treatment and risk factor modification  ~1. Post cath IVF. Bedrest.   2. Recommend beta blocker, high potency statin, aspirin and plavix for 6-12 months. No ace/arb required given normal LVEF. 3. Referral to outpatient cardiac rehab phase II will be deferred until patient follow-up in office and then determine patient safety and appropriateness to proceed  4. Patient has been advised on the importance of regular exercise of at least 20-30 minutes daily. 5. Patient counseled about and offered assistance for smoking cessation   6.  Follow up in 2 weeks with cardiology            Janelle Moss MD, MD 2022 8:41 AM

## 2022-08-25 NOTE — PRE SEDATION
Brief Pre-Op Note/Sedation Assessment      Estephania Rajput  1985  [id number]  7:53 AM    Planned Procedure: Cardiac Catheterization Procedure  Post Procedure Plan: Return to same level of care  Consent: I have discussed with the patient and/or the patient representative the indication, alternatives, and the possible risks and/or complications of the planned procedure and the anesthesia methods. The patient and/or patient representative appear to understand and agree to proceed. Chief Complaint:   Chest Pain/Pressure  Anginal Equivalent      Indications for Cath Procedure:  Presentation:  New Onset Angina <= 2 months, Worsening Angina, and Suspected CAD  2. Anginal Classification within 2 weeks:  CCS III - Symptoms with everyday living activities, i.e., moderate limitation  3. Angina Symptoms Assessment:  Typical Chest Pain  4. Heart Failure Class within last 2 weeks:  No symptoms  5. Cardiovascular Instability:  No    Prior Ischemic Workup/Eval:  Pre-Procedural Medications: Yes: Aspirin, Beta Blockers, and STATIN  2. Stress Test Completed? Yes:  Stress or Imaging Studies Performed (within ANY time period):   Type:  Stress Nuclear  Results:  Positive:  Myocardial Perfusion Defects (Nuclear) Extent of Ischemia:  Intermediate    Does Patient need surgery? Cath Valve Surgery:  No    Pre-Procedure Medical History:  Vital Signs:  BP (!) 147/85   Pulse 87   Temp 97.5 °F (36.4 °C) (Infrared)   Resp 18   Ht 6' 7\" (2.007 m)   Wt (!) 380 lb (172.4 kg)   SpO2 99%   BMI 42.81 kg/m²     Allergies:     Allergies   Allergen Reactions    Amoxicillin      Bladder infection     Medications:    Current Facility-Administered Medications   Medication Dose Route Frequency Provider Last Rate Last Admin    sodium chloride flush 0.9 % injection 5-40 mL  5-40 mL IntraVENous PRN Murl Kocher, MD           Past Medical History:    Past Medical History:   Diagnosis Date    ADHD (attention deficit hyperactivity disorder)     Autism     Chicken pox     CKD (chronic kidney disease)     Depression     Flu 03/06/2019    Hx: UTI (urinary tract infection)     Hyperlipidemia     Hyperlipidemia 08/23/2018    Hypertension     Hypothyroidism     Insulin resistance 02/05/2018    Microalbuminuria     Obesity     Type 2 diabetes mellitus with diabetic polyneuropathy, with long-term current use of insulin ([facility] [address] 02/05/2018    Type 2 diabetes mellitus without complication (HCC)     Vitamin D deficiency        Surgical History:    Past Surgical History:   Procedure Laterality Date    APPENDECTOMY      TYMPANOSTOMY TUBE PLACEMENT               Pre-Sedation:  Pre-Sedation Documentation and Exam:  I have personally completed a history, physical exam & review of systems for this patient (see notes). Prior History of Anesthesia Complications:   none    Modified Mallampati:  II (soft palate, uvula, fauces visible)    ASA Classification:  Class 2 - A normal healthy patient with mild systemic disease    Chandana Scale: Activity:  2 - Able to move 4 extremities voluntarily on command  Respiration:  2 - Able to breathe deeply and cough freely  Circulation:  2 - BP+/- 20mmHg of normal  Consciousness:  2 - Fully awake  Oxygen Saturation (color):  2 - Able to maintain oxygen saturation >92% on room air    Sedation/Anesthesia Plan:  Guard the patient's safety and welfare. Minimize physical discomfort and pain. Minimize negative psychological responses to treatment by providing sedation and analgesia and maximize the potential amnesia. Patient to meet pre-procedure discharge plan.     Medication Planned:  midazolam intravenously and fentanyl intravenously    Patient is an appropriate candidate for plan of sedation:   yes      Electronically signed by Beatriz Newsome MD on 8/25/2022 at 7:53 AM

## 2022-08-29 DIAGNOSIS — Z79.4 TYPE 2 DIABETES MELLITUS WITH DIABETIC POLYNEUROPATHY, WITH LONG-TERM CURRENT USE OF INSULIN (HCC): ICD-10-CM

## 2022-08-29 DIAGNOSIS — E11.42 TYPE 2 DIABETES MELLITUS WITH DIABETIC POLYNEUROPATHY, WITH LONG-TERM CURRENT USE OF INSULIN (HCC): ICD-10-CM

## 2022-08-29 RX ORDER — BLOOD SUGAR DIAGNOSTIC
STRIP MISCELLANEOUS
Qty: 100 EACH | Refills: 5 | Status: SHIPPED | OUTPATIENT
Start: 2022-08-29

## 2022-09-13 ENCOUNTER — HOSPITAL ENCOUNTER (OUTPATIENT)
Age: 37
Discharge: HOME | End: 2022-09-13
Payer: COMMERCIAL

## 2022-09-13 ENCOUNTER — OFFICE VISIT (OUTPATIENT)
Dept: CARDIOLOGY CLINIC | Age: 37
End: 2022-09-13
Payer: COMMERCIAL

## 2022-09-13 VITALS
HEIGHT: 78 IN | HEART RATE: 90 BPM | SYSTOLIC BLOOD PRESSURE: 98 MMHG | WEIGHT: 315 LBS | BODY MASS INDEX: 36.45 KG/M2 | OXYGEN SATURATION: 96 % | DIASTOLIC BLOOD PRESSURE: 62 MMHG

## 2022-09-13 DIAGNOSIS — E03.8 HYPOTHYROIDISM DUE TO HASHIMOTO'S THYROIDITIS: ICD-10-CM

## 2022-09-13 DIAGNOSIS — E11.42 TYPE 2 DIABETES MELLITUS WITH DIABETIC POLYNEUROPATHY, WITH LONG-TERM CURRENT USE OF INSULIN (HCC): ICD-10-CM

## 2022-09-13 DIAGNOSIS — Z79.4 TYPE 2 DIABETES MELLITUS WITH DIABETIC POLYNEUROPATHY, WITH LONG-TERM CURRENT USE OF INSULIN (HCC): ICD-10-CM

## 2022-09-13 DIAGNOSIS — I10 ESSENTIAL HYPERTENSION: ICD-10-CM

## 2022-09-13 DIAGNOSIS — I25.10 CORONARY ARTERY DISEASE DUE TO LIPID RICH PLAQUE: Primary | ICD-10-CM

## 2022-09-13 DIAGNOSIS — R06.83 SNORES: ICD-10-CM

## 2022-09-13 DIAGNOSIS — I25.83 CORONARY ARTERY DISEASE DUE TO LIPID RICH PLAQUE: Primary | ICD-10-CM

## 2022-09-13 DIAGNOSIS — E78.2 MIXED HYPERLIPIDEMIA: ICD-10-CM

## 2022-09-13 DIAGNOSIS — I10 PRIMARY HYPERTENSION: ICD-10-CM

## 2022-09-13 DIAGNOSIS — E06.3 HYPOTHYROIDISM DUE TO HASHIMOTO'S THYROIDITIS: ICD-10-CM

## 2022-09-13 LAB
A/G RATIO: 1.4 (ref 1.1–2.2)
ALBUMIN SERPL-MCNC: 4.6 G/DL (ref 3.4–5)
ALP BLD-CCNC: 70 U/L (ref 40–129)
ALT SERPL-CCNC: 41 U/L (ref 10–40)
ANION GAP SERPL CALCULATED.3IONS-SCNC: 17 MMOL/L (ref 3–16)
AST SERPL-CCNC: 26 U/L (ref 15–37)
BILIRUB SERPL-MCNC: 0.4 MG/DL (ref 0–1)
BUN BLDV-MCNC: 10 MG/DL (ref 7–20)
CALCIUM SERPL-MCNC: 9.2 MG/DL (ref 8.3–10.6)
CHLORIDE BLD-SCNC: 100 MMOL/L (ref 99–110)
CHOLESTEROL, FASTING: 100 MG/DL (ref 0–199)
CO2: 23 MMOL/L (ref 21–32)
CREAT SERPL-MCNC: 1.1 MG/DL (ref 0.9–1.3)
CREATININE URINE: 401.1 MG/DL (ref 39–259)
GFR AFRICAN AMERICAN: >60
GFR NON-AFRICAN AMERICAN: >60
GLUCOSE BLD-MCNC: 198 MG/DL (ref 70–99)
HDLC SERPL-MCNC: 31 MG/DL (ref 40–60)
LDL CHOLESTEROL CALCULATED: 34 MG/DL
MICROALBUMIN UR-MCNC: 18.8 MG/DL
MICROALBUMIN/CREAT UR-RTO: 46.9 MG/G (ref 0–30)
POTASSIUM SERPL-SCNC: 4.5 MMOL/L (ref 3.5–5.1)
SODIUM BLD-SCNC: 140 MMOL/L (ref 136–145)
TOTAL PROTEIN: 7.8 G/DL (ref 6.4–8.2)
TRIGLYCERIDE, FASTING: 177 MG/DL (ref 0–150)
TSH SERPL DL<=0.05 MIU/L-ACNC: 1.75 UIU/ML (ref 0.27–4.2)
VLDLC SERPL CALC-MCNC: 35 MG/DL

## 2022-09-13 PROCEDURE — 82043 UR ALBUMIN QUANTITATIVE: CPT

## 2022-09-13 PROCEDURE — 36415 COLL VENOUS BLD VENIPUNCTURE: CPT

## 2022-09-13 PROCEDURE — 80053 COMPREHEN METABOLIC PANEL: CPT

## 2022-09-13 PROCEDURE — 82570 ASSAY OF URINE CREATININE: CPT

## 2022-09-13 PROCEDURE — 83036 HEMOGLOBIN GLYCOSYLATED A1C: CPT

## 2022-09-13 PROCEDURE — 80061 LIPID PANEL: CPT

## 2022-09-13 PROCEDURE — 99214 OFFICE O/P EST MOD 30 MIN: CPT | Performed by: NURSE PRACTITIONER

## 2022-09-13 PROCEDURE — 84443 ASSAY THYROID STIM HORMONE: CPT

## 2022-09-13 NOTE — PROGRESS NOTES
[place] 83     Outpatient Follow Up Note    CHIEF COMPLAINT / HPI: Hospital Follow Up secondary to status post coronary angioplasty    Hospital record has been reviewed  Hospital Course progressed as follows per discharge summary: Elective procedure with c/o CP & SOB with subsequent abnl myoview stress. He had undergone a successful PTCA to the LAD on 8/25     Wash Oxana is 31 y.o. male who presents today for a routine follow up after a recent hospitalization related to the above mentioned issues. He recalls having had discomfort in his chest, sometimes pain and SOB. Subjective:   Since the time of discharge, the patient admits their symptoms have improved. He denies significant chest pain. He's had discomfort in his Rt shoulder and upper chest off/on. He thinks its from leaning on his Rt elbow when playing video games. There is SOB/PIRES, ie getting up off the floor. He snores off/on and has bad allergies. He tried having a sleep study years ago but didn't go so well (his twin is treated). The patient is not experiencing palpitations. These symptoms are stable over the last many days. With regard to medication therapy the patient has been compliant with prescribed regimen. They have tolerated therapy to date.      Past Medical History:   Diagnosis Date    ADHD (attention deficit hyperactivity disorder)     Autism     Chicken pox     CKD (chronic kidney disease)     Depression     Flu 03/06/2019    Hx: UTI (urinary tract infection)     Hyperlipidemia     Hyperlipidemia 08/23/2018    Hypertension     Hypothyroidism     Insulin resistance 02/05/2018    Microalbuminuria     Obesity     Type 2 diabetes mellitus with diabetic polyneuropathy, with long-term current use of insulin ([facility] [address] 02/05/2018    Type 2 diabetes mellitus without complication (HCC)     Vitamin D deficiency      Social History:    Social History     Tobacco Use   Smoking Status Never   Smokeless Tobacco Never     Current Medications:  Current Outpatient Medications   Medication Sig Dispense Refill    Insulin Syringe-Needle U-100 (BD INSULIN SYRINGE U/F) 31G X 5/16\" 1 ML MISC INSULIN SYRINGES 1ML SIZE USE FOR INSULIN INJECTION 3 TIMES A DAY (PRESCRIBER OK IS NEEDED FOR NEXT FILL) 100 each 5    clopidogrel (PLAVIX) 75 MG tablet Take 1 tablet by mouth daily 90 tablet 3    metoprolol tartrate (LOPRESSOR) 25 MG tablet Take 1 tablet by mouth 2 times daily 180 tablet 1    HUMULIN R 500 UNIT/ML concentrated injection vial INJECT 275 UNITS(DRAW UP TO 55 UNIT LINE ON U-100 SYRINGE) THREE TIMES A DAY    55 units BID per pt (Patient taking differently: INJECT 275 UNITS(DRAW UP TO 55 UNIT LINE ON U-100 SYRINGE) THREE TIMES A DAY) 1 mL 0    aspirin 81 MG EC tablet Take 81 mg by mouth in the morning. loratadine (CLARITIN) 10 MG tablet Take 10 mg by mouth in the morning. atorvastatin (LIPITOR) 80 MG tablet Take 1 tablet by mouth in the morning.  30 tablet 5    levothyroxine (SYNTHROID) 125 MCG tablet TAKE ONE TABLET DAILY **NO REFILLS-PRESCRIBER OK NEEDED FOR NEXT REFILL** 90 tablet 1    VASCEPA 1 g CAPS capsule 2 CAPSULES BY MOUTH 2 TIMES A DAY (PRESCRIBER OK IS NEEDED FOR NEXT FILL) 120 capsule 5    metFORMIN (GLUCOPHAGE-XR) 500 MG extended release tablet 2 TABLETS 2 TIMES A DAY (PRESCRIBER OK IS NEEDED FOR NEXT FILL) 120 tablet 2    vitamin D (ERGOCALCIFEROL) 1.25 MG (07189 UT) CAPS capsule 1 CAPSULE BY MOUTH ONCE EACH WEEK (PRESCRIBER OK IS NEEDED FOR NEXT FILL) 4 capsule 5    losartan-hydroCHLOROthiazide (HYZAAR) 50-12.5 MG per tablet 1 TABLET ONCE DAILY (REPLACES LINSINOPRIL HCTZ) (PRESCRIBER OK IS NEEDED FOR NEXT FILL) 30 tablet 5    blood glucose test strips (ONETOUCH ULTRA) strip TEST 6 TIMES A DAY AND AS NEEDED FOR SYMPTOMS OF IRREGULAR BLOOD SUGAR 300 strip 5    Semaglutide, 1 MG/DOSE, 2 MG/1.5ML SOPN 1 mg weekly 1.5 mL 5    Insulin Pen Needle ([employer] PEN NEEDLES 31G) 31G X 8 MM MISC 1 each by Does not apply route daily 100 each 3    Insulin Syringe/Needle U-500 (BD INSULIN SYRINGE U-500) 31G X 6MM 0.5 ML MISC Use with U500 insulin  each 3    Blood Glucose Monitoring Suppl (ONE TOUCH ULTRA 2) w/Device KIT 1 kit by Does not apply route daily 1 kit 0    Lancets MISC 1 each by Does not apply route daily 100 each 3    traZODone (DESYREL) 100 MG tablet TAKE 1 TO 2 TABLETS BY MOUTH AT BEDTIME **MAY REFILL** **INCREASE IN DIRECTIONS**      venlafaxine (EFFEXOR XR) 150 MG extended release capsule Take 150 mg by mouth daily       No current facility-administered medications for this visit. REVIEW OF SYSTEMS:   CONSTITUTIONAL: No major weight gain or loss, fatigue, weakness, night sweats or fever. There's been no change in energy level, sleep pattern, or activity level. HEENT: No new vision difficulties or ringing in the ears. RESPIRATORY: No new SOB, PND, orthopnea or cough. CARDIOVASCULAR: See HPI  GI: No nausea, vomiting, diarrhea, constipation, abdominal pain or changes in bowel habits. : No urinary frequency, urgency, incontinence hematuria or dysuria. SKIN: No cyanosis or skin lesions. MUSCULOSKELETAL: No new muscle or joint pain. NEUROLOGICAL: No syncope or TIA-like symptoms. PSYCHIATRIC: No anxiety, pain, insomnia or depression    Objective:   PHYSICAL EXAM:       Vitals:    09/13/22 1123 09/13/22 1200   BP: 90/70 98/62   Site: Right Upper Arm Left Upper Arm   Position: Sitting    Cuff Size: Large Adult Large Adult   Pulse: 90    SpO2: 96%    Weight: (!) 382 lb 3.2 oz (173.4 kg)    Height: 6' 7\" (2.007 m)         VITALS:  BP 90/70 (Site: Right Upper Arm, Position: Sitting, Cuff Size: Large Adult)   Pulse 90   Ht 6' 7\" (2.007 m)   Wt (!) 382 lb 3.2 oz (173.4 kg)   SpO2 96%   BMI 43.06 kg/m²     CONSTITUTIONAL: Cooperative, no apparent distress, and appears well nourished / obese  NEUROLOGIC:  Awake and orientated to person, place and time. PSYCH: Calm affect. SKIN: Warm and dry; Rt radial unremarkable.   HEENT: Sclera non-icteric, normocephalic, neck supple, no elevation of JVP, normal carotid pulses with no bruits and thyroid normal size. LUNGS:  No increased work of breathing and clear to auscultation, no crackles or wheezing. CARDIOVASCULAR:  Regular rate 84 and rhythm with no murmurs, gallops, rubs, or abnormal heart sounds, normal PMI. The apical impulses not displaced. Heart tones are crisp and normal                                                                                          Cervical veins are not engorged                 JVP less than 8 cm H2O                                                                              The carotid upstroke is normal in amplitude and contour without delay or bruit    ABDOMEN:  Normal bowel sounds, non-distended and non-tender to palpation   EXT: No edema, no calf tenderness. Pulses are present bilaterally.     DATA:    Lab Results   Component Value Date    ALT 47 (H) 05/19/2022    AST 24 05/19/2022    ALKPHOS 59 05/19/2022    BILITOT <0.2 05/19/2022     Lab Results   Component Value Date    CREATININE 1.0 08/25/2022    BUN 10 08/25/2022     08/25/2022    K 3.9 08/25/2022     08/25/2022    CO2 26 08/25/2022     Lab Results   Component Value Date    TSH 2.39 05/19/2022     Lab Results   Component Value Date    WBC 6.2 08/25/2022    HGB 14.6 08/25/2022    HCT 43.4 08/25/2022    MCV 85.6 08/25/2022     08/25/2022     No components found for: CHLPL  Lab Results   Component Value Date    TRIG 248 (H) 05/19/2022    TRIG 226 (H) 01/06/2022    TRIG 196 (H) 08/30/2021     Lab Results   Component Value Date    HDL 33 (L) 05/19/2022    HDL 33 (L) 01/06/2022    HDL 32 (L) 08/30/2021     Lab Results   Component Value Date    LDLCALC 114 (H) 05/19/2022    LDLCALC 100 (H) 01/06/2022    LDLCALC 105 (H) 08/30/2021     Lab Results   Component Value Date    LABVLDL 50 05/19/2022    LABVLDL 45 01/06/2022    LABVLDL 39 08/30/2021     Radiology Review:  Pertinent images / reports were reviewed as a part of this visit and reveals the following:      Stress Test: July '22:  Area of fixed perfusion defect seen in the anteroseptal region with associated wall motion defect is most compatible with prior infarct. However, the should be correlated with patient's clinical history and there is questionable degree of a marginal degree of reperfusion and element of acute ischemia is difficult to exclude. There is resulting mildly diminished ejection fraction. 54% ejection fraction. Cardiac Cath PCI: 8/25/22  Anatomy:   LM-nml   LAD-prox 50%, mid 80%, distal 90%  Cx-nml  OM- nml  RCA-dom nml  RPDA- nml  LVEF- 60  LVG- 65%  LVEDP- 15     Intervention  ~Successful PCI to LAD with 3.0x38 Bacilio, 3.5x33 BACILIO. Excellent Result. Impression  ~Coronary Angiography w/ severe single vessel CAD  ~LVG with LVEF of 65 and no regional wall motion abnormalities  ~Successful complex angioplasty and stenting of LAD      Assessment:      Diagnosis Orders   1. Coronary artery disease due to lipid rich plaque   ~stable: s/p PTCA LAD 8/25/22; normal LVEF  ~DAPT / statin / BB  ~RF: sedentary , second hand tobacco , DM [address]      2. Mixed hyperlipidemia   ~new : LDL goal < 70  ~atorvastatin 80 mg daily  ~last A1c 8.0% : diabetes and thyroid disease managed by endocrinology Lipid, Fasting    Comprehensive Metabolic Panel      3. Primary hypertension   ~well controlled on Hyzaar  ~tolerating the addition of metoprolol        4. Snores   ~reported remote evaluation which did not turn out favorable  Chad Baker MD, Pulmonary, [facility]          Patient  is stable since hospital discharge.     Plan:  Cardiac rehab phase II vs Silver Sneakers   Lipid profile/CMP as routine after starting atorvastatin   Referral to pul : sleep evaluation    F/U in 8-10 weeks     I have addressed the patient's cardiac risk factors and adjusted pharmacologic treatment

## 2022-09-13 NOTE — PATIENT INSTRUCTIONS
Cholesterol levels as routine    Cardiac rehab or Silver Sneakers    Referral to pul : sleep evaluation    Appt in 8-10 weeks

## 2022-09-14 ENCOUNTER — TELEPHONE (OUTPATIENT)
Dept: CARDIOLOGY CLINIC | Age: 37
End: 2022-09-14

## 2022-09-14 DIAGNOSIS — I10 BENIGN ESSENTIAL HTN: Primary | ICD-10-CM

## 2022-09-14 DIAGNOSIS — E78.2 MIXED HYPERLIPIDEMIA: ICD-10-CM

## 2022-09-14 LAB
ESTIMATED AVERAGE GLUCOSE: 174.3 MG/DL
HBA1C MFR BLD: 7.7 %

## 2022-09-14 NOTE — TELEPHONE ENCOUNTER
Call placed to patient who was not available for message below. Per Hippa left message on vm. Patient encouraged to call with any questions.  Orders placed

## 2022-09-14 NOTE — TELEPHONE ENCOUNTER
----- Message from RICARDO Michael CNP sent at 9/14/2022  1:19 PM EDT -----  LDL much better    Trig up along with glucose : hx diabetes    Recheck LFTs in three months ; recheck lipids in six months

## 2022-09-26 ENCOUNTER — OFFICE VISIT (OUTPATIENT)
Dept: PULMONOLOGY | Age: 37
End: 2022-09-26
Payer: COMMERCIAL

## 2022-09-26 VITALS — HEART RATE: 91 BPM | BODY MASS INDEX: 43.48 KG/M2 | OXYGEN SATURATION: 96 % | WEIGHT: 315 LBS

## 2022-09-26 DIAGNOSIS — Z79.4 TYPE 2 DIABETES MELLITUS WITH DIABETIC POLYNEUROPATHY, WITH LONG-TERM CURRENT USE OF INSULIN (HCC): ICD-10-CM

## 2022-09-26 DIAGNOSIS — G47.33 OSA (OBSTRUCTIVE SLEEP APNEA): Primary | ICD-10-CM

## 2022-09-26 DIAGNOSIS — E11.42 TYPE 2 DIABETES MELLITUS WITH DIABETIC POLYNEUROPATHY, WITH LONG-TERM CURRENT USE OF INSULIN (HCC): ICD-10-CM

## 2022-09-26 DIAGNOSIS — E66.01 MORBID OBESITY (HCC): ICD-10-CM

## 2022-09-26 DIAGNOSIS — I10 ESSENTIAL HYPERTENSION: ICD-10-CM

## 2022-09-26 PROCEDURE — 99204 OFFICE O/P NEW MOD 45 MIN: CPT | Performed by: INTERNAL MEDICINE

## 2022-09-26 RX ORDER — LOSARTAN POTASSIUM 50 MG/1
50 TABLET ORAL DAILY
COMMUNITY

## 2022-09-26 RX ORDER — METFORMIN HYDROCHLORIDE 500 MG/1
TABLET, EXTENDED RELEASE ORAL
Qty: 120 TABLET | Refills: 2 | Status: SHIPPED | OUTPATIENT
Start: 2022-09-26 | End: 2022-10-05

## 2022-09-26 RX ORDER — METHOCARBAMOL 750 MG/1
750 TABLET, FILM COATED ORAL 4 TIMES DAILY
COMMUNITY

## 2022-09-26 NOTE — PROGRESS NOTES
PULMONARY OFFICE NEW PATIENT VISIT    CONSULTING PHYSICIAN:      REASON FOR VISIT:   Chief Complaint   Patient presents with    New Patient       DATE OF VISIT: 9/26/2022    HISTORY OF PRESENT ILLNESS: 31y.o. year old male with past medical history of CAD, hypertension, hyperlipidemia, DM, autism who is here for evaluation of sleep apnea. Patient complains of snoring with excessive daytime sleepiness and fatigue. He complains of gasping and choking in sleep. He has never had a sleep study. Patient has CAD with recent PCI. He also has history of hypertension and diabetes. He is morbidly obese with a BMI of 43.4. REVIEW OF SYSTEMS:   CONSTITUTIONAL SYMPTOMS: The patient denies fever, fatigue, night sweats, weight loss or weight gain. HEENT: No vision changes. No tinnitus, Denies sinus pain. No hoarseness, or dysphagia. NECK: Patient denies swelling in the neck. CARDIOVASCULAR: Denies chest pain, palpitation, syncope. RESPIRATORY: see above   GASTROINTESTINAL: Denies nausea, abdominal pain or change in bowel function. GENITOURINARY: Denies obstructive symptoms. No history of incontinence. BREASTS: No masses or lumps in the breasts. SKIN: No rashes or itching. MUSCULOSKELETAL: Denies weakness or bone pain. NEUROLOGICAL: No headaches or seizures. PSYCHIATRIC: Denies mood swings or depression. ENDOCRINE: Denies heat or cold intolerance or excessive thirst.  HEMATOLOGIC/LYMPHATIC: Denies easy bruising or lymph node swelling. ALLERGIC/IMMUNOLOGIC: No environmental allergies.     PAST MEDICAL HISTORY:   Past Medical History:   Diagnosis Date    ADHD (attention deficit hyperactivity disorder)     Autism     Chicken pox     CKD (chronic kidney disease)     Depression     Flu 03/06/2019    Hx: UTI (urinary tract infection)     Hyperlipidemia     Hyperlipidemia 08/23/2018    Hypertension     Hypothyroidism     Insulin resistance 02/05/2018    Microalbuminuria     Obesity     Type 2 diabetes mellitus with diabetic polyneuropathy, with long-term current use of insulin ([facility] [address] 02/05/2018    Type 2 diabetes mellitus without complication (HCC)     Vitamin D deficiency        PAST SURGICAL HISTORY:   Past Surgical History:   Procedure Laterality Date    APPENDECTOMY      TYMPANOSTOMY TUBE PLACEMENT          SOCIAL HISTORY:   Social History     Tobacco Use    Smoking status: Never    Smokeless tobacco: Never   Vaping Use    Vaping Use: Never used   Substance Use Topics    Alcohol use: Yes     Comment: occasional    Drug use: No       FAMILY HISTORY:   Family History   Problem Relation Age of Onset    Diabetes Mother     High Blood Pressure Mother     High Blood Pressure Father     Other Maternal Grandmother         lymphoma    Heart Disease Maternal Grandfather        MEDICATIONS:     Current Outpatient Medications on File Prior to Visit   Medication Sig Dispense Refill    methocarbamol (ROBAXIN) 750 MG tablet Take 750 mg by mouth 4 times daily      losartan (COZAAR) 50 MG tablet Take 50 mg by mouth daily      Insulin Syringe-Needle U-100 (BD INSULIN SYRINGE U/F) 31G X 5/16\" 1 ML MISC INSULIN SYRINGES 1ML SIZE USE FOR INSULIN INJECTION 3 TIMES A DAY (PRESCRIBER OK IS NEEDED FOR NEXT FILL) 100 each 5    clopidogrel (PLAVIX) 75 MG tablet Take 1 tablet by mouth daily 90 tablet 3    metoprolol tartrate (LOPRESSOR) 25 MG tablet Take 1 tablet by mouth 2 times daily 180 tablet 1    HUMULIN R 500 UNIT/ML concentrated injection vial INJECT 275 UNITS(DRAW UP TO 55 UNIT LINE ON U-100 SYRINGE) THREE TIMES A DAY    55 units BID per pt (Patient taking differently: INJECT 275 UNITS(DRAW UP TO 55 UNIT LINE ON U-100 SYRINGE) THREE TIMES A DAY) 1 mL 0    aspirin 81 MG EC tablet Take 81 mg by mouth in the morning. loratadine (CLARITIN) 10 MG tablet Take 10 mg by mouth in the morning. atorvastatin (LIPITOR) 80 MG tablet Take 1 tablet by mouth in the morning.  30 tablet 5    levothyroxine (SYNTHROID) 125 MCG tablet TAKE ONE TABLET DAILY **NO REFILLS-PRESCRIBER OK NEEDED FOR NEXT REFILL** 90 tablet 1    VASCEPA 1 g CAPS capsule 2 CAPSULES BY MOUTH 2 TIMES A DAY (PRESCRIBER OK IS NEEDED FOR NEXT FILL) 120 capsule 5    metFORMIN (GLUCOPHAGE-XR) 500 MG extended release tablet 2 TABLETS 2 TIMES A DAY (PRESCRIBER OK IS NEEDED FOR NEXT FILL) 120 tablet 2    vitamin D (ERGOCALCIFEROL) 1.25 MG (48523 UT) CAPS capsule 1 CAPSULE BY MOUTH ONCE EACH WEEK (PRESCRIBER OK IS NEEDED FOR NEXT FILL) 4 capsule 5    losartan-hydroCHLOROthiazide (HYZAAR) 50-12.5 MG per tablet 1 TABLET ONCE DAILY (REPLACES LINSINOPRIL HCTZ) (PRESCRIBER OK IS NEEDED FOR NEXT FILL) 30 tablet 5    blood glucose test strips (ONETOUCH ULTRA) strip TEST 6 TIMES A DAY AND AS NEEDED FOR SYMPTOMS OF IRREGULAR BLOOD SUGAR 300 strip 5    Semaglutide, 1 MG/DOSE, 2 MG/1.5ML SOPN 1 mg weekly 1.5 mL 5    Insulin Pen Needle ([employer] PEN NEEDLES 31G) 31G X 8 MM MISC 1 each by Does not apply route daily 100 each 3    Insulin Syringe/Needle U-500 (BD INSULIN SYRINGE U-500) 31G X 6MM 0.5 ML MISC Use with U500 insulin  each 3    Blood Glucose Monitoring Suppl (ONE TOUCH ULTRA 2) w/Device KIT 1 kit by Does not apply route daily 1 kit 0    Lancets MISC 1 each by Does not apply route daily 100 each 3    traZODone (DESYREL) 100 MG tablet TAKE 1 TO 2 TABLETS BY MOUTH AT BEDTIME **MAY REFILL** **INCREASE IN DIRECTIONS**      venlafaxine (EFFEXOR XR) 150 MG extended release capsule Take 150 mg by mouth daily      [DISCONTINUED] hydroCHLOROthiazide (HYDRODIURIL) 25 MG tablet 1 TABLET BY MOUTH ONCE DAILY **MAY REFILL** (Patient not taking: No sig reported)      [DISCONTINUED] lisinopril-hydroCHLOROthiazide (PRINZIDE;ZESTORETIC) 10-12.5 MG per tablet 1 TABLET ONCE DAILY (PRESCRIBER OK IS NEEDED FOR NEXT FILL) (Patient not taking: No sig reported) 30 tablet 5     No current facility-administered medications on file prior to visit.                ALLERGIES:   Allergies as of 09/26/2022 - Fully Reviewed 09/26/2022   Allergen Reaction Noted    Amoxicillin  12/21/2016      OBJECTIVE:   weight is 386 lb (175.1 kg) (abnormal). His pulse is 91. His oxygen saturation is 96%. PHYSICAL EXAM:    CONSTITUTIONAL: He is a 31y.o.-year-old who appears his stated age. He is alert and oriented x 3 and in no acute distress. Morbid obesity  HEENT: PERRL. No scleral icterus. No thrush, atraumatic, normocephalic. NECK: Supple, without cervical or supraclavicular lymphadenopathy:  CARDIOVASCULAR: S1 S2 RRR. Without murmer  RESPIRATORY & CHEST: Lungs are clear to auscultation and percussion. No wheezing, no crackles. Good air movement  GASTROINTESTINAL & ABDOMEN: Soft, nontender, positive bowel sounds in all quadrants, non-distended, without hepatosplenomegaly. GENITOURINARY: Deferred. MUSCULOSKELETAL: No tenderness to palpation of the axial skeleton. There is no clubbing. No cyanosis. No edema of the lower extremities. SKIN OF BODY: No rash or jaundice. PSYCHIATRIC EVALUATION: Normal affect. Patient answers questions appropriately. HEMATOLOGIC/LYMPHATIC/ IMMUNOLOGIC: No palpable lymphadenopathy. NEUROLOGIC: Alert and oriented x 3. Groslly non-focal. Motor strength is 5+/5 in all muscle groups. The patient has a normal sensorium globally.       LABS:  Lab Results   Component Value Date    WBC 6.2 08/25/2022    HGB 14.6 08/25/2022    HCT 43.4 08/25/2022     08/25/2022    CHOL 197 05/19/2022    TRIG 248 (H) 05/19/2022    HDL 31 (L) 09/13/2022    LDLDIRECT 119 (H) 01/24/2020    ALT 41 (H) 09/13/2022    AST 26 09/13/2022     09/13/2022    K 4.5 09/13/2022     09/13/2022    CREATININE 1.1 09/13/2022    BUN 10 09/13/2022    CO2 23 09/13/2022    TSH 1.75 09/13/2022       Lab Results   Component Value Date    GLUCOSE 198 (H) 09/13/2022    CALCIUM 9.2 09/13/2022     09/13/2022    K 4.5 09/13/2022    CO2 23 09/13/2022     09/13/2022    BUN 10 09/13/2022    CREATININE 1.1 09/13/2022 ASSESSMENT AND PLAN:     1. BRENDEN (obstructive sleep apnea)  Patient has complaints of snoring with excessive daytime sleepiness and fatigue. Also complains of gasping and choking in sleep. He would benefit from obstructive sleep apnea evaluation.  - Home Sleep Study; Future    2. Essential hypertension  Patient is on antihypertensives. Patient was educated about effect of uncontrolled sleep apnea on hypertension. 3. Morbid obesity ([address]  Patient was educated about diet and lifestyle modification to help lose weight. Return in about 3 months (around 12/26/2022). Savage Mike MD  Pulmonary Critical Care and Sleep Medicine  Electronically signed by Savage Mike MD on 9/26/2022 at 1:43 PM     This note was completed using dragon medical speech recognition software. Grammatical errors, random word insertions, pronoun errors and incomplete sentences are occasional consequences of this technology due to software limitations. If there are questions or concerns about the content of this note of information contained within the body of this dictation they should be addressed with the provider for clarification.

## 2022-10-05 ENCOUNTER — OFFICE VISIT (OUTPATIENT)
Dept: ENDOCRINOLOGY | Age: 37
End: 2022-10-05
Payer: COMMERCIAL

## 2022-10-05 VITALS
HEART RATE: 89 BPM | HEIGHT: 78 IN | WEIGHT: 315 LBS | BODY MASS INDEX: 36.45 KG/M2 | RESPIRATION RATE: 16 BRPM | SYSTOLIC BLOOD PRESSURE: 123 MMHG | DIASTOLIC BLOOD PRESSURE: 82 MMHG

## 2022-10-05 DIAGNOSIS — E06.3 HYPOTHYROIDISM DUE TO HASHIMOTO'S THYROIDITIS: ICD-10-CM

## 2022-10-05 DIAGNOSIS — E03.8 HYPOTHYROIDISM DUE TO HASHIMOTO'S THYROIDITIS: ICD-10-CM

## 2022-10-05 DIAGNOSIS — I10 ESSENTIAL HYPERTENSION: ICD-10-CM

## 2022-10-05 DIAGNOSIS — E78.2 MIXED HYPERLIPIDEMIA: ICD-10-CM

## 2022-10-05 DIAGNOSIS — Z79.4 TYPE 2 DIABETES MELLITUS WITH DIABETIC POLYNEUROPATHY, WITH LONG-TERM CURRENT USE OF INSULIN (HCC): Primary | ICD-10-CM

## 2022-10-05 DIAGNOSIS — E11.42 TYPE 2 DIABETES MELLITUS WITH DIABETIC POLYNEUROPATHY, WITH LONG-TERM CURRENT USE OF INSULIN (HCC): Primary | ICD-10-CM

## 2022-10-05 PROCEDURE — 99215 OFFICE O/P EST HI 40 MIN: CPT | Performed by: INTERNAL MEDICINE

## 2022-10-05 PROCEDURE — 3051F HG A1C>EQUAL 7.0%<8.0%: CPT | Performed by: INTERNAL MEDICINE

## 2022-10-05 RX ORDER — ROSUVASTATIN CALCIUM 40 MG/1
40 TABLET, COATED ORAL DAILY
Qty: 30 TABLET | Refills: 3 | Status: SHIPPED | OUTPATIENT
Start: 2022-10-05 | End: 2023-10-05

## 2022-10-05 NOTE — PROGRESS NOTES
Elias Truong is a 37 y.o. male who follows in endocrine clinic for uncontrolled Type 2 diabetes mellitus, ess hypertension ,hyperlipidemia, hypothyroidism  and  severe obesity . He was  diagnosed with diabetes at age 17 years he was started on Oral pills metfromin for years and then he was switched to insulin he was on 70/30 combination LYNNETTE is a high function autistic adult , he was  followed at [facility] in the past , and he has a twin brother who is a diabetic too. Pt  lives with his mom but still fixes his own meals and skips his insulin doses occasionally and doesn't follow a diabetic diet and doesn't exercise much either ---Mom is a diabetic too but she lets Zane Peers make his own decision and according to mom she has noticed a lot of noncompliance on pt's part . He is also noted to have Vit d level was only 5 initially   He also has hypertension and is on meds   He was diagnoseed with ADHD and follows with his PCP   He  lost 40 lbs in summer 2016 and his finger stick blood glucose  improved , he gained  the weight back   Pt also has hyperlipidemia and is on medication  He is diagnosed with coronary artery disease status post stents in August 2022 and now follows with cardiology  INTERIM:    Diabetes  He presents for his follow-up diabetic visit. He has type 2 diabetes mellitus. No MedicAlert identification noted. The initial diagnosis of diabetes was made 18 years ago. His disease course has been stable. Hypoglycemia symptoms include nervousness/anxiousness and sweats. Pertinent negatives for hypoglycemia include no speech difficulty or tremors. Associated symptoms include fatigue and weakness. There are no hypoglycemic complications. Pertinent negatives for hypoglycemia complications include no required glucagon injection. Symptoms are stable. Diabetic complications include nephropathy. Risk factors for coronary artery disease include diabetes mellitus, dyslipidemia, hypertension and male sex.  Current diabetic treatment includes intensive insulin program. He is compliant with treatment some of the time. He is following a high fat/cholesterol diet. When asked about meal planning, he reported none. He has not had a previous visit with a dietitian. He rarely participates in exercise. There is no change in his home blood glucose trend. His breakfast blood glucose is taken after 10 am. His breakfast blood glucose range is generally 110-130 mg/dl. An ACE inhibitor/angiotensin II receptor blocker is being taken. He sees a podiatrist.Eye exam is current.       Stress test abnormal after chest pain and SOB  He needed heart cath and had 2 stents in august 2022     Past Medical History:   Diagnosis Date    ADHD (attention deficit hyperactivity disorder)     Autism     Chicken pox     CKD (chronic kidney disease)     Depression     Down syndrome     Flu 03/06/2019    Hx: UTI (urinary tract infection)     Hyperlipidemia     Hyperlipidemia 8/23/2018    Hypertension     Hypothyroidism     Insulin resistance 2/5/2018    Microalbuminuria     Obesity     Type 2 diabetes mellitus with diabetic polyneuropathy, with long-term current use of insulin ([address] 2/5/2018    Type 2 diabetes mellitus without complication ([facility])     Vitamin D deficiency       Patient Active Problem List   Diagnosis    Allergic rhinitis    Allergy to mold spores    Attention deficit hyperactivity disorder (ADHD)    CKD (chronic kidney disease) stage 2, GFR 60-89 ml/min    Down syndrome    Essential hypertension    Hypothyroidism    Obesity    Pes cavus    Vitamin D deficiency    Type 2 diabetes mellitus with diabetic polyneuropathy, with long-term current use of insulin ([facility])    Insulin resistance    Hyperlipidemia     Past Surgical History:   Procedure Laterality Date    APPENDECTOMY      TYMPANOSTOMY TUBE PLACEMENT       Social History     Socioeconomic History    Marital status: Single     Spouse name: Not on file    Number of children: Not on file    Years of education: Not on file    Highest education level: Not on file   Occupational History    Not on file   Social Needs    Financial resource strain: Not on file    Food insecurity     Worry: Not on file     Inability: Not on file    Transportation needs     Medical: Not on file     Non-medical: Not on file   Tobacco Use    Smoking status: Never Smoker    Smokeless tobacco: Never Used   Substance and Sexual Activity    Alcohol use: No    Drug use: No    Sexual activity: Not on file   Lifestyle    Physical activity     Days per week: Not on file     Minutes per session: Not on file    Stress: Not on file   Relationships    Social connections     Talks on phone: Not on file     Gets together: Not on file     Attends Orthodox service: Not on file     Active member of club or organization: Not on file     Attends meetings of clubs or organizations: Not on file     Relationship status: Not on file    Intimate partner violence     Fear of current or ex partner: Not on file     Emotionally abused: Not on file     Physically abused: Not on file     Forced sexual activity: Not on file   Other Topics Concern    Not on file   Social History Narrative    Not on file     Family History   Problem Relation Age of Onset    Diabetes Mother     High Blood Pressure Mother     High Blood Pressure Father     Other Maternal Grandmother         lymphoma    Heart Disease Maternal Grandfather      Current Outpatient Medications   Medication Sig Dispense Refill    Insulin Syringe-Needle U-100 (INSULIN SYRINGE 1CC/31GX5/16\") 31G X 5/16\" 1 ML MISC 1 each by Does not apply route daily 100 Syringe 6    metFORMIN (GLUCOPHAGE-XR) 500 MG extended release tablet 2 TABLETS 2 TIMES A  tablet 2    HUMULIN R 500 UNIT/ML concentrated injection vial INJECT 175 UNITS(DRAW UP TO 35 UNIT LINE ON U-100 SYRINGE) THREE TIMES A DAY **MAY REFILL** 20 mL 5    lisinopril-hydroCHLOROthiazide (PRINZIDE;ZESTORETIC) 10-12.5 MG per tablet 1 TABLET ONCE DAILY (NEW RX OR MD NEEDS TO BE CONTACTED FOR MORE REFILLS) 30 tablet 5    Icosapent Ethyl (VASCEPA) 1 g CAPS capsule 2 CAPSULES BY MOUTH 2 TIMES A DAY (PRESCRIBER OK IS NEEDED FOR NEXT FILL) 120 capsule 2    blood glucose test strips (FREESTYLE LITE) strip USE TO CHECK BLOOD SUGAR 6 TIMES DAILY 600 strip 5    vitamin D (ERGOCALCIFEROL) 1.25 MG (37749 UT) CAPS capsule TAKE 1 CAPSULE (50,000 UNITS TOTAL) BY MOUTH ONCE A WEEK. (PRESCRIBER OK IS NEEDED FOR NEXT FILL) 4 capsule 5    levothyroxine (SYNTHROID) 125 MCG tablet Take 1 tablet by mouth daily 90 tablet 1    Insulin Syringe-Needle U-100 (TRUEPLUS INSULIN SYRINGE) 31G X 5/16\" 0.5 ML MISC USE WITH INSULIN THREE TIMES A DAY **MAY REFILL** 300 each 5    Lancets MISC Use 4 times a day      traZODone (DESYREL) 100 MG tablet TAKE 1 TO 2 TABLETS BY MOUTH AT BEDTIME **MAY REFILL** **INCREASE IN DIRECTIONS**      venlafaxine (EFFEXOR XR) 150 MG extended release capsule Take 150 mg by mouth      Liraglutide (VICTOZA) 18 MG/3ML SOPN SC injection Inject 1.8mg daily into the skin 3 pen 3     No current facility-administered medications for this visit. Allergies   Allergen Reactions    Amoxicillin      Bladder infection       ROS   I have reviewed the review of system questionnaire filled by the patient .   Patient was advised to contact PCP for non endocrine signs and symptoms         OBJECTIVE:  Vitals:    10/05/22 1545   BP: 123/82   Pulse:    Resp:        Wt Readings from Last 3 Encounters:   10/05/22 (!) 386 lb (175.1 kg)   09/26/22 (!) 386 lb (175.1 kg)   09/13/22 (!) 382 lb 3.2 oz (173.4 kg)     Constitutional: no acute distress, well appearing, well nourished  Psychiatric: oriented to person, place and time, judgement, insight and normal, recent and remote memory and intact and mood, affect are normal  Skin: skin and subcutaneous tissue is normal without mass,   Head and Face: examination of head and face revealed no abnormalities  Eyes: no lid or conjunctival swelling, no erythema or discharge, pupils are normal,   Ears/Nose: external inspection of ears and nose revealed no abnormalities, hearing is grossly normal  Oropharynx/Mouth/Face: lips, tongue and gums are normal with no lesions, the voice quality was normal  Neck: neck is supple and symmetric, with midline trachea and no masses, thyroid is nonpalpable due to body habitus  Pulmonary: no increased work of breathing or signs of respiratory distress, lungs are clear to auscultation  Cardiovascular: normal heart rate and rhythm, normal S1 and S2,   Musculoskeletal: normal gait and station,   Neurological: normal coordination, normal general cortical function      Lab Results   Component Value Date    LABA1C 8.6 05/03/2021    LABA1C 8.1 01/19/2021    LABA1C 8.8 01/24/2020         ASSESSMENT/PLAN:      Uncontrolled TYPE 2 DIABETES WITH COMPLICATIONS - Microalbuminuria --nephropathy a1c 7.5>>8.8>>7.8>>8.2>>8.8>>8.1>>7.8   A1c 7.7  LYNNETTE is on U 500 insulin he has been taking 55---70  units TID  via insulin syringe  --- sent prescription for U500 syringes. --Discussed kiara Cisneros again he is not interested  Start Synjardy 12.5/1000 mg twice a day, patient advised to get renal panel done in 2 to 3 weeks after starting Synjardy. On metformin 1000 twice a day stop metformin  On OZempic 1 mg weekly will increase the dose to 2 mg weekly  RX sent   Today again we discussed dietary modification and the need to change eating habits   Hypoglycemia protocol reviewed in detail with patient Patient was advised to carry glucose tablets and also have glucagon emergency kit. Provided with RX for glucagon. Patient was advised that sending of his fingerstick blood glucose logs is crucial in management of his diabetes. We will adjust his dose of insulin according to sent data.      Health maintenance   -advised to have annual dilated eye exam 2018 no retinopathy   - microalb/creat ratio was elevated in  april 2019  --saw Dr Cristina Adame in the past, most recent value still elevated at 125  -foot exam performed  follows with Podiatry Dr Donald Tellez every 3 month ---      -for his buffalo hump I did MSCx 2 which came back abnl I am not positive if he even did at the right time saw her in jan 2018 .  -1 mg dex suppression test was normal cortisol was <0.1.   --- repeat 24 hr urine cortisol in August 2019 was again normal    CAD s/p stents  Follows with cardiology       Hypothyroidism   On 112 mcg daily tsh 1.31 in august 2021   ---on Lt4 125 mcg daily and recheck labs at follow up in 3 months   --Patient has been taking his medication regularly he denies any use of iron or calcium with his thyroid medication   Pt appears clinically and biochemically euthyroid on current dose of Levothyroxine , will continue on same dose. Pt was advised to avoid taking Levothyroxine with concomitant Iron and calcium containing supplements and try taking 1/2 hr before eating.    --thyroid uls was normal 8/2014   his Abs were negative. Elevated LFTS now getting better   usg showed fatty liver   Discussed the need to follow up with PCP   Discussed weight loss in detail     Autism   stable     Hypertension   He is on Losaratn /hctz  Lisinopril stopped in jan 2022 due to dry cough       Hyperlipidemia hypertriglyceridemia   Start Crestor 40 mg daily      VIT D DEF   levels improved from 5 ----24 >>26 >>28>>40   Advised to take 5000 iu dialy   advised to increase sun exposure     OBesity   Advised to step up his diet and exercise. He lost 20 lbs in summer 2016   I had a lengthy discussion with the patient regarding caloric restriction as well as stepping up exercise. We discussed caloric goal in detail. Also discussed with patient the utility of mobile phone apps like \" my fitness Pal \" or \"Lose It \". Patient verbalized understanding and agrees to work on this aspect. he has no stigma of  Cushing disease or untreated thyroid disorder.    Previous workup was negative     Depression   On effexor and welbutrin as per PCP      Reviewed and/or ordered clinical lab results Yes  Reviewed and/or ordered radiology tests Yes  Reviewed and/or ordered other diagnostic tests Yes  Made a decision to obtain old records Yes  Reviewed and summarized old records Yes      Karolina Corley was counseled regarding symptoms of current diagnosis, course and complications of disease if inadequately treated, side effects of medications, diagnosis, treatment options, and prognosis, risks, benefits, complications, and alternatives of treatment, labs, imaging and other studies and treatment targets and goals. He understands instructions and counseling. These diagnosis were discussed and reviewed with the patient including the advantages of drug therapy. He was counseled at this visit on the following: diabetes complication prevention and foot care. I spent  40 + minutes which includes reviewing patient chart , interpreting previous lab results  , discussing and providing counseling and coordinating care of patient's multiple health issues with  the patient. Return in about 3 months (around 8/5/2021).

## 2022-10-19 ENCOUNTER — HOSPITAL ENCOUNTER (OUTPATIENT)
Dept: SLEEP CENTER | Age: 37
Discharge: HOME | End: 2022-10-19
Payer: COMMERCIAL

## 2022-10-19 DIAGNOSIS — G47.33 OSA (OBSTRUCTIVE SLEEP APNEA): ICD-10-CM

## 2022-10-19 PROCEDURE — 95806 SLEEP STUDY UNATT&RESP EFFT: CPT

## 2022-10-25 DIAGNOSIS — G47.33 OSA (OBSTRUCTIVE SLEEP APNEA): Primary | ICD-10-CM

## 2022-10-26 ENCOUNTER — TELEPHONE (OUTPATIENT)
Dept: PULMONOLOGY | Age: 37
End: 2022-10-26

## 2022-10-27 DIAGNOSIS — E11.42 TYPE 2 DIABETES MELLITUS WITH DIABETIC POLYNEUROPATHY, WITH LONG-TERM CURRENT USE OF INSULIN (HCC): ICD-10-CM

## 2022-10-27 DIAGNOSIS — Z79.4 TYPE 2 DIABETES MELLITUS WITH DIABETIC POLYNEUROPATHY, WITH LONG-TERM CURRENT USE OF INSULIN (HCC): ICD-10-CM

## 2022-10-27 RX ORDER — INSULIN HUMAN 500 [IU]/ML
INJECTION, SOLUTION SUBCUTANEOUS
Qty: 40 ML | Refills: 2 | Status: SHIPPED | OUTPATIENT
Start: 2022-10-27

## 2022-10-27 NOTE — TELEPHONE ENCOUNTER
Spoke with Vidal boles pt's mother. HST results discussed. Patient to be scheduled for titration study. Order sent to sleep lab.

## 2022-10-31 ENCOUNTER — TELEPHONE (OUTPATIENT)
Dept: SLEEP CENTER | Age: 37
End: 2022-10-31

## 2022-11-04 DIAGNOSIS — E06.3 HYPOTHYROIDISM DUE TO HASHIMOTO'S THYROIDITIS: ICD-10-CM

## 2022-11-04 DIAGNOSIS — Z79.4 TYPE 2 DIABETES MELLITUS WITH DIABETIC POLYNEUROPATHY, WITH LONG-TERM CURRENT USE OF INSULIN (HCC): ICD-10-CM

## 2022-11-04 DIAGNOSIS — E03.8 HYPOTHYROIDISM DUE TO HASHIMOTO'S THYROIDITIS: ICD-10-CM

## 2022-11-04 DIAGNOSIS — E11.42 TYPE 2 DIABETES MELLITUS WITH DIABETIC POLYNEUROPATHY, WITH LONG-TERM CURRENT USE OF INSULIN (HCC): ICD-10-CM

## 2022-11-04 LAB
A/G RATIO: 1.7 (ref 1.1–2.2)
ALBUMIN SERPL-MCNC: 4.8 G/DL (ref 3.4–5)
ALP BLD-CCNC: 58 U/L (ref 40–129)
ALT SERPL-CCNC: 33 U/L (ref 10–40)
ANION GAP SERPL CALCULATED.3IONS-SCNC: 16 MMOL/L (ref 3–16)
AST SERPL-CCNC: 24 U/L (ref 15–37)
BILIRUB SERPL-MCNC: 0.5 MG/DL (ref 0–1)
BUN BLDV-MCNC: 15 MG/DL (ref 7–20)
CALCIUM SERPL-MCNC: 9.5 MG/DL (ref 8.3–10.6)
CHLORIDE BLD-SCNC: 99 MMOL/L (ref 99–110)
CHOLESTEROL, TOTAL: 72 MG/DL (ref 0–199)
CO2: 25 MMOL/L (ref 21–32)
CREAT SERPL-MCNC: 1 MG/DL (ref 0.9–1.3)
GFR SERPL CREATININE-BSD FRML MDRD: >60 ML/MIN/{1.73_M2}
GLUCOSE BLD-MCNC: 138 MG/DL (ref 70–99)
HDLC SERPL-MCNC: 27 MG/DL (ref 40–60)
LDL CHOLESTEROL CALCULATED: 18 MG/DL
POTASSIUM SERPL-SCNC: 4.3 MMOL/L (ref 3.5–5.1)
SODIUM BLD-SCNC: 140 MMOL/L (ref 136–145)
TOTAL PROTEIN: 7.6 G/DL (ref 6.4–8.2)
TRIGL SERPL-MCNC: 137 MG/DL (ref 0–150)
VLDLC SERPL CALC-MCNC: 27 MG/DL

## 2022-11-08 RX ORDER — SEMAGLUTIDE 1.34 MG/ML
INJECTION, SOLUTION SUBCUTANEOUS
Qty: 3 ML | OUTPATIENT
Start: 2022-11-08

## 2022-11-09 ENCOUNTER — OFFICE VISIT (OUTPATIENT)
Dept: CARDIOLOGY CLINIC | Age: 37
End: 2022-11-09
Payer: COMMERCIAL

## 2022-11-09 VITALS
WEIGHT: 315 LBS | HEIGHT: 78 IN | HEART RATE: 58 BPM | SYSTOLIC BLOOD PRESSURE: 100 MMHG | DIASTOLIC BLOOD PRESSURE: 70 MMHG | BODY MASS INDEX: 36.45 KG/M2 | OXYGEN SATURATION: 97 %

## 2022-11-09 DIAGNOSIS — I25.83 CORONARY ARTERY DISEASE DUE TO LIPID RICH PLAQUE: Primary | ICD-10-CM

## 2022-11-09 DIAGNOSIS — E78.2 MIXED HYPERLIPIDEMIA: ICD-10-CM

## 2022-11-09 DIAGNOSIS — I25.10 CORONARY ARTERY DISEASE DUE TO LIPID RICH PLAQUE: Primary | ICD-10-CM

## 2022-11-09 DIAGNOSIS — I10 PRIMARY HYPERTENSION: ICD-10-CM

## 2022-11-09 PROCEDURE — 3074F SYST BP LT 130 MM HG: CPT | Performed by: NURSE PRACTITIONER

## 2022-11-09 PROCEDURE — 99214 OFFICE O/P EST MOD 30 MIN: CPT | Performed by: NURSE PRACTITIONER

## 2022-11-09 PROCEDURE — 3078F DIAST BP <80 MM HG: CPT | Performed by: NURSE PRACTITIONER

## 2022-11-09 NOTE — PROGRESS NOTES
[address]     Outpatient Follow Up Note    Zack Mancia is [address] y.o. male who presents today with a history of CAD s/p PTCA LAD Aug '22, HTN and hyperlipidemia      CHIEF COMPLAINT / HPI:  Follow Up secondary to coronary artery disease  He feels the same as usual  Subjective:   Sometimes he has CP walking around the store or doing things. It lasts about 1 hr and goes away with rest. Sometimes his Rt shoulder hurts (arm he leans on when playing video games)    He denies significant chest pain. There is SOB mostly after bending at the waist. Sometimes walking will trigger it. The patient denies orthopnea/PND. He had a sleep study and will f/u for treatment. The patient does not have swelling. The patients weight is down 6# / weeks by office scales . The patient is not experiencing palpitations or dizziness. These symptoms show no change since the last OV. With regard to medication therapy the patient has been compliant with prescribed regimen. They have tolerated therapy to date.      Past Medical History:   Diagnosis Date    ADHD (attention deficit hyperactivity disorder)     Autism     Chicken pox     CKD (chronic kidney disease)     Depression     Flu 03/06/2019    Hx: UTI (urinary tract infection)     Hyperlipidemia     Hyperlipidemia 08/23/2018    Hypertension     Hypothyroidism     Insulin resistance 02/05/2018    Microalbuminuria     Obesity     Type 2 diabetes mellitus with diabetic polyneuropathy, with long-term current use of insulin ([facility] [address] 02/05/2018    Type 2 diabetes mellitus without complication ([facility])     Vitamin D deficiency      Social History:    Social History     Tobacco Use   Smoking Status Never   Smokeless Tobacco Never     Current Medications:  Current Outpatient Medications   Medication Sig Dispense Refill    insulin regular human (HUMULIN R) 500 UNIT/ML concentrated injection vial INJECT 275 UNITS(DRAW UP TO 55 UNIT LINE ON U-100 SYRINGE) THREE TIMES A DAY 40 mL 2    rosuvastatin (CRESTOR) 40 MG tablet Take 1 tablet by mouth daily 30 tablet 3    Empagliflozin-metFORMIN HCl ER 12.5-1000 MG TB24 Take 1 tab BID 60 tablet 5    Semaglutide, 2 MG/DOSE, 8 MG/3ML SOPN 2mg weekly 9 mL 2    clopidogrel (PLAVIX) 75 MG tablet Take 1 tablet by mouth daily 90 tablet 3    metoprolol tartrate (LOPRESSOR) 25 MG tablet Take 1 tablet by mouth 2 times daily 180 tablet 1    aspirin 81 MG EC tablet Take 81 mg by mouth in the morning. loratadine (CLARITIN) 10 MG tablet Take 10 mg by mouth in the morning.       levothyroxine (SYNTHROID) 125 MCG tablet TAKE ONE TABLET DAILY **NO REFILLS-PRESCRIBER OK NEEDED FOR NEXT REFILL** 90 tablet 1    VASCEPA 1 g CAPS capsule 2 CAPSULES BY MOUTH 2 TIMES A DAY (PRESCRIBER OK IS NEEDED FOR NEXT FILL) 120 capsule 5    vitamin D (ERGOCALCIFEROL) 1.25 MG (79823 UT) CAPS capsule 1 CAPSULE BY MOUTH ONCE EACH WEEK (PRESCRIBER OK IS NEEDED FOR NEXT FILL) 4 capsule 5    losartan-hydroCHLOROthiazide (HYZAAR) 50-12.5 MG per tablet 1 TABLET ONCE DAILY (REPLACES LINSINOPRIL HCTZ) (PRESCRIBER OK IS NEEDED FOR NEXT FILL) 30 tablet 5    traZODone (DESYREL) 100 MG tablet TAKE 1 TO 2 TABLETS BY MOUTH AT BEDTIME **MAY REFILL** **INCREASE IN DIRECTIONS**      venlafaxine (EFFEXOR XR) 150 MG extended release capsule Take 150 mg by mouth daily      methocarbamol (ROBAXIN) 750 MG tablet Take 750 mg by mouth 4 times daily (Patient not taking: Reported on 11/9/2022)      Insulin Syringe-Needle U-100 (BD INSULIN SYRINGE U/F) 31G X 5/16\" 1 ML MISC INSULIN SYRINGES 1ML SIZE USE FOR INSULIN INJECTION 3 TIMES A DAY (PRESCRIBER OK IS NEEDED FOR NEXT FILL) 100 each 5    blood glucose test strips (ONETOUCH ULTRA) strip TEST 6 TIMES A DAY AND AS NEEDED FOR SYMPTOMS OF IRREGULAR BLOOD SUGAR 300 strip 5    Insulin Pen Needle ([employer] PEN NEEDLES 31G) 31G X 8 MM MISC 1 each by Does not apply route daily 100 each 3    Insulin Syringe/Needle U-500 (BD INSULIN SYRINGE U-500) 31G X 6MM 0.5 ML MISC Use with U500 insulin  each 3    Blood Glucose Monitoring Suppl (ONE TOUCH ULTRA 2) w/Device KIT 1 kit by Does not apply route daily 1 kit 0    Lancets MISC 1 each by Does not apply route daily 100 each 3     No current facility-administered medications for this visit. REVIEW OF SYSTEMS:    CONSTITUTIONAL: No major weight gain or loss, fatigue, weakness, night sweats or fever. HEENT: No new vision difficulties or ringing in the ears. RESPIRATORY: No new SOB, PND, orthopnea or cough. CARDIOVASCULAR: See HPI  GI: No nausea, vomiting, diarrhea, constipation, abdominal pain or changes in bowel habits. : No urinary frequency, urgency, incontinence hematuria or dysuria. SKIN: No cyanosis or skin lesions. MUSCULOSKELETAL: No new muscle or joint pain. NEUROLOGICAL: No syncope or TIA-like symptoms. PSYCHIATRIC: No anxiety, pain, insomnia or depression    Objective:   PHYSICAL EXAM:       Vitals:    11/09/22 1118 11/09/22 1142   BP: 90/60 100/70   Site: Left Upper Arm Right Upper Arm   Position: Sitting    Cuff Size: Large Adult Large Adult   Pulse: 58    SpO2: 97%    Weight: (!) 376 lb (170.6 kg)    Height: 6' 7\" (2.007 m)         VITALS:  BP 90/60 (Site: Left Upper Arm, Position: Sitting, Cuff Size: Large Adult)   Pulse 58   Ht 6' 7\" (2.007 m)   Wt (!) 376 lb (170.6 kg)   SpO2 97%   BMI 42.36 kg/m²   CONSTITUTIONAL: Cooperative, no apparent distress, and appears well nourished / obese  NEUROLOGIC:  Awake and orientated to person, place and time. PSYCH: Calm affect. SKIN: Warm and dry. HEENT: Sclera non-icteric, normocephalic, neck supple, no elevation of JVP, normal carotid pulses with no bruits and thyroid normal size. LUNGS:  No increased work of breathing and clear to auscultation, no crackles or wheezing  CARDIOVASCULAR:  Regular rate 88 and rhythm with no murmurs, gallops, rubs, or abnormal heart sounds, normal PMI. The apical impulses not displaced  JVP less than 8 cm H2O  Heart tones are crisp and normal  Cervical veins are not engorged  The carotid upstroke is normal in amplitude and contour without delay or bruit  JVP is not elevated  ABDOMEN:  Normal bowel sounds, non-distended and non-tender to palpation  EXT: No edema, no calf tenderness. Pulses are present bilaterally. DATA:    Lab Results   Component Value Date    ALT 33 11/04/2022    AST 24 11/04/2022    ALKPHOS 58 11/04/2022    BILITOT 0.5 11/04/2022     Lab Results   Component Value Date    CREATININE 1.0 11/04/2022    BUN 15 11/04/2022     11/04/2022    K 4.3 11/04/2022    CL 99 11/04/2022    CO2 25 11/04/2022     Lab Results   Component Value Date    TSH 1.75 09/13/2022     Lab Results   Component Value Date    WBC 6.2 08/25/2022    HGB 14.6 08/25/2022    HCT 43.4 08/25/2022    MCV 85.6 08/25/2022     08/25/2022     No components found for: CHLPL  Lab Results   Component Value Date    TRIG 137 11/04/2022    TRIG 248 (H) 05/19/2022    TRIG 226 (H) 01/06/2022     Lab Results   Component Value Date    HDL 27 (L) 11/04/2022    HDL 31 (L) 09/13/2022    HDL 33 (L) 05/19/2022     Lab Results   Component Value Date    LDLCALC 18 11/04/2022    LDLCALC 34 09/13/2022    LDLCALC 114 (H) 05/19/2022     Lab Results   Component Value Date    LABVLDL 27 11/04/2022    LABVLDL 35 09/13/2022    LABVLDL 50 05/19/2022       Radiology Review:  Pertinent images / reports were reviewed as a part of this visit and reveals the following:    Stress Test: July '22:  Area of fixed perfusion defect seen in the anteroseptal region with associated wall motion defect is most compatible with prior infarct. However, the should be correlated with patient's clinical history and there is questionable degree of a marginal degree of reperfusion and element of acute ischemia is difficult to exclude. There is resulting mildly diminished ejection fraction. 54% ejection fraction.       Cardiac Cath PCI: 8/25/22  Anatomy:   LM-nml   LAD-prox 50%, mid 80%, distal 90%  Cx-nml  OM- nml  RCA-dom nml  RPDA- nml  LVEF- 60  LVG- 65%  LVEDP- 15     Intervention  ~Successful PCI to LAD with 3.0x38 Bacilio, 3.5x33 BACILIO. Excellent Result. Impression  ~Coronary Angiography w/ severe single vessel CAD  ~LVG with LVEF of 65 and no regional wall motion abnormalities  ~Successful complex angioplasty and stenting of LAD       Assessment:      Diagnosis Orders   1. Coronary artery disease due to lipid rich plaque   ~stable  ~c/o atypical discomfort, possibly MCSK d/t positioning when playing video games  ~s/p PTCA LAD Aug '22  ~declines CR II  ~DAPT / statin / BB       2. Primary hypertension   ~controlled at low end normal  ~denies light headednse       3. Mixed hyperlipidemia   ~LDL at goal ; HDL low  ~crestor / vascepa  ~DM followed by endocrinology         I had the opportunity to review the clinical symptoms and presentation of Lorenzo Bravo. Plan:     Encouraged to develop a routine walking plan  F/U in 3 months with Dr. Hung Zamora    Overall the patient is stable from CV standpoint    I have addresed the patient's cardiac risk factors and adjusted pharmacologic treatment as needed. In addition, I have reinforced the need for patient directed risk factor modification. Further evaluation will be based upon the patient's clinical course and testing results. All questions and concerns were addressed to the patient/family. Alternatives to my treatment were discussed. The patient is not currently smoking. The risks related to smoking were reviewed with the patient. Recommend maintaining a smoke-free lifestyle    Patient is on a beta-blocker  Patient is on an ace-i/ARB  Patient is on a statin    Dual Antiplatelet therapy has been recommended / prescribed for this patient. Education conducted on adverse reactions including bleeding was discussed. The patient verbalizes understanding not to stop medications without discussing with us.     Discussed exercise: 30-60 minutes 7 days/week ; has no routine. Unable to participate in CR II  Discussed Low saturated fat/MARITZA diet. SMBG 77 then rechecked at 80 (has been waking in the 60s which is unusual for him)    Thank you for allowing to us to participate in the care of Hai Mcknight.     RICARDO Garces    Documentation of today's visit sent to PCP

## 2022-12-13 DIAGNOSIS — E55.9 VITAMIN D DEFICIENCY: ICD-10-CM

## 2022-12-13 RX ORDER — ERGOCALCIFEROL 1.25 MG/1
CAPSULE ORAL
Qty: 4 CAPSULE | Refills: 5 | Status: SHIPPED | OUTPATIENT
Start: 2022-12-13

## 2023-01-06 DIAGNOSIS — E03.8 HYPOTHYROIDISM DUE TO HASHIMOTO'S THYROIDITIS: ICD-10-CM

## 2023-01-06 DIAGNOSIS — E78.2 MIXED HYPERLIPIDEMIA: ICD-10-CM

## 2023-01-06 DIAGNOSIS — E06.3 HYPOTHYROIDISM DUE TO HASHIMOTO'S THYROIDITIS: ICD-10-CM

## 2023-01-06 DIAGNOSIS — I10 BENIGN ESSENTIAL HTN: ICD-10-CM

## 2023-01-06 DIAGNOSIS — Z79.4 TYPE 2 DIABETES MELLITUS WITH DIABETIC POLYNEUROPATHY, WITH LONG-TERM CURRENT USE OF INSULIN (HCC): ICD-10-CM

## 2023-01-06 DIAGNOSIS — E11.42 TYPE 2 DIABETES MELLITUS WITH DIABETIC POLYNEUROPATHY, WITH LONG-TERM CURRENT USE OF INSULIN (HCC): ICD-10-CM

## 2023-01-06 DIAGNOSIS — I10 ESSENTIAL HYPERTENSION: ICD-10-CM

## 2023-01-06 LAB
A/G RATIO: 1.5 (ref 1.1–2.2)
ALBUMIN SERPL-MCNC: 4.3 G/DL (ref 3.4–5)
ALP BLD-CCNC: 53 U/L (ref 40–129)
ALT SERPL-CCNC: 24 U/L (ref 10–40)
ANION GAP SERPL CALCULATED.3IONS-SCNC: 12 MMOL/L (ref 3–16)
AST SERPL-CCNC: 20 U/L (ref 15–37)
BILIRUB SERPL-MCNC: 0.3 MG/DL (ref 0–1)
BUN BLDV-MCNC: 9 MG/DL (ref 7–20)
CALCIUM SERPL-MCNC: 9.5 MG/DL (ref 8.3–10.6)
CHLORIDE BLD-SCNC: 101 MMOL/L (ref 99–110)
CHOLESTEROL, FASTING: 82 MG/DL (ref 0–199)
CO2: 28 MMOL/L (ref 21–32)
CREAT SERPL-MCNC: 1.1 MG/DL (ref 0.9–1.3)
CREATININE URINE: 156.7 MG/DL (ref 39–259)
GFR SERPL CREATININE-BSD FRML MDRD: >60 ML/MIN/{1.73_M2}
GLUCOSE BLD-MCNC: 106 MG/DL (ref 70–99)
HDLC SERPL-MCNC: 29 MG/DL (ref 40–60)
LDL CHOLESTEROL CALCULATED: 26 MG/DL
MICROALBUMIN UR-MCNC: 6.3 MG/DL
MICROALBUMIN/CREAT UR-RTO: 40.2 MG/G (ref 0–30)
POTASSIUM SERPL-SCNC: 4 MMOL/L (ref 3.5–5.1)
SODIUM BLD-SCNC: 141 MMOL/L (ref 136–145)
TOTAL PROTEIN: 7.2 G/DL (ref 6.4–8.2)
TRIGLYCERIDE, FASTING: 135 MG/DL (ref 0–150)
TSH SERPL DL<=0.05 MIU/L-ACNC: 0.62 UIU/ML (ref 0.27–4.2)
VLDLC SERPL CALC-MCNC: 27 MG/DL

## 2023-01-06 RX ORDER — LOSARTAN POTASSIUM AND HYDROCHLOROTHIAZIDE 12.5; 5 MG/1; MG/1
TABLET ORAL
Qty: 30 TABLET | Refills: 5 | Status: SHIPPED | OUTPATIENT
Start: 2023-01-06

## 2023-01-07 LAB
ESTIMATED AVERAGE GLUCOSE: 137 MG/DL
HBA1C MFR BLD: 6.4 %

## 2023-01-09 ENCOUNTER — TELEPHONE (OUTPATIENT)
Dept: ENDOCRINOLOGY | Age: 38
End: 2023-01-09

## 2023-01-09 ENCOUNTER — TELEPHONE (OUTPATIENT)
Dept: CARDIOLOGY CLINIC | Age: 38
End: 2023-01-09

## 2023-01-09 DIAGNOSIS — B37.9 YEAST INFECTION: Primary | ICD-10-CM

## 2023-01-09 RX ORDER — FLUCONAZOLE 150 MG/1
150 TABLET ORAL ONCE
Qty: 1 TABLET | Refills: 0 | Status: SHIPPED | OUTPATIENT
Start: 2023-01-09 | End: 2023-01-09

## 2023-01-09 NOTE — TELEPHONE ENCOUNTER
Pt's mom calling and states that Dr Columba Munoz had prescribed a new medication for the [facility] and he has a yeast infection .  He was prescribed Diflucan & a cream from family  but mom states he still has yeast infection    # [phone] [facility]

## 2023-01-09 NOTE — TELEPHONE ENCOUNTER
----- Message from RICARDO Morley CNP sent at 1/9/2023  8:57 AM EST -----  Ok; HDL remains below goal; encourage walking

## 2023-01-09 NOTE — TELEPHONE ENCOUNTER
[facility] [place] [facility] Returned patients moms call and advised her to tell patient to stop taking the [place]. Sent a prescription for diflucan. If patient continues with a yeast injection in the next few days after stopping the Synjardy and taking the diflucan, patient needs to call the office back. Advised him to go back to taking the Metformin 1000mg twice daily and we will discuss other options at his appt next week.

## 2023-01-18 ENCOUNTER — OFFICE VISIT (OUTPATIENT)
Dept: ENDOCRINOLOGY | Age: 38
End: 2023-01-18
Payer: COMMERCIAL

## 2023-01-18 VITALS
HEART RATE: 101 BPM | WEIGHT: 315 LBS | SYSTOLIC BLOOD PRESSURE: 101 MMHG | DIASTOLIC BLOOD PRESSURE: 56 MMHG | HEIGHT: 78 IN | BODY MASS INDEX: 36.45 KG/M2 | RESPIRATION RATE: 16 BRPM

## 2023-01-18 DIAGNOSIS — E88.81 INSULIN RESISTANCE: ICD-10-CM

## 2023-01-18 DIAGNOSIS — E03.8 HYPOTHYROIDISM DUE TO HASHIMOTO'S THYROIDITIS: ICD-10-CM

## 2023-01-18 DIAGNOSIS — E11.42 TYPE 2 DIABETES MELLITUS WITH DIABETIC POLYNEUROPATHY, WITH LONG-TERM CURRENT USE OF INSULIN (HCC): Primary | ICD-10-CM

## 2023-01-18 DIAGNOSIS — Z79.4 TYPE 2 DIABETES MELLITUS WITH DIABETIC POLYNEUROPATHY, WITH LONG-TERM CURRENT USE OF INSULIN (HCC): Primary | ICD-10-CM

## 2023-01-18 DIAGNOSIS — E06.3 HYPOTHYROIDISM DUE TO HASHIMOTO'S THYROIDITIS: ICD-10-CM

## 2023-01-18 PROCEDURE — 3078F DIAST BP <80 MM HG: CPT | Performed by: INTERNAL MEDICINE

## 2023-01-18 PROCEDURE — 99215 OFFICE O/P EST HI 40 MIN: CPT | Performed by: INTERNAL MEDICINE

## 2023-01-18 PROCEDURE — 3044F HG A1C LEVEL LT 7.0%: CPT | Performed by: INTERNAL MEDICINE

## 2023-01-18 PROCEDURE — 3074F SYST BP LT 130 MM HG: CPT | Performed by: INTERNAL MEDICINE

## 2023-01-18 RX ORDER — ATORVASTATIN CALCIUM 80 MG/1
TABLET, FILM COATED ORAL
COMMUNITY
Start: 2022-11-16 | End: 2023-01-18

## 2023-01-18 NOTE — PROGRESS NOTES
Jeri Gregorio is a 37 y.o. male who follows in endocrine clinic for uncontrolled Type 2 diabetes mellitus, ess hypertension ,hyperlipidemia, hypothyroidism  and  severe obesity . He was  diagnosed with diabetes at age 17 years he was started on Oral pills metfromin for years and then he was switched to insulin he was on 70/30 combination LYNNETTE is a high function autistic adult , he was  followed at [facility] in the past , and he has a twin brother who is a diabetic too. Pt  lives with his mom but still fixes his own meals and skips his insulin doses occasionally and doesn't follow a diabetic diet and doesn't exercise much either ---Mom is a diabetic too but she lets Partida Gum make his own decision and according to mom she has noticed a lot of noncompliance on pt's part . He is also noted to have Vit d level was only 5 initially   He also has hypertension and is on meds   He was diagnoseed with ADHD and follows with his PCP   He  lost 40 lbs in summer 2016 and his finger stick blood glucose  improved , he gained  the weight back   Pt also has hyperlipidemia and is on medication  He is diagnosed with coronary artery disease status post stents in August 2022 and now follows with cardiology  INTERIM:    Diabetes  He presents for his follow-up diabetic visit. He has type 2 diabetes mellitus. No MedicAlert identification noted. The initial diagnosis of diabetes was made 18 years ago. His disease course has been stable. Hypoglycemia symptoms include nervousness/anxiousness and sweats. Pertinent negatives for hypoglycemia include no speech difficulty or tremors. Associated symptoms include fatigue and weakness. There are no hypoglycemic complications. Pertinent negatives for hypoglycemia complications include no required glucagon injection. Symptoms are stable. Diabetic complications include nephropathy. Risk factors for coronary artery disease include diabetes mellitus, dyslipidemia, hypertension and male sex.  Current diabetic treatment includes intensive insulin program. He is compliant with treatment some of the time. He is following a high fat/cholesterol diet. When asked about meal planning, he reported none. He has not had a previous visit with a dietitian. He rarely participates in exercise. There is no change in his home blood glucose trend. His breakfast blood glucose is taken after 10 am. His breakfast blood glucose range is generally 110-130 mg/dl. An ACE inhibitor/angiotensin II receptor blocker is being taken. He sees a podiatrist.Eye exam is current.       Stress test abnormal after chest pain and SOB  He needed heart cath and had 2 stents in august 2022     Past Medical History:   Diagnosis Date    ADHD (attention deficit hyperactivity disorder)     Autism     Chicken pox     CKD (chronic kidney disease)     Depression     Down syndrome     Flu 03/06/2019    Hx: UTI (urinary tract infection)     Hyperlipidemia     Hyperlipidemia 8/23/2018    Hypertension     Hypothyroidism     Insulin resistance 2/5/2018    Microalbuminuria     Obesity     Type 2 diabetes mellitus with diabetic polyneuropathy, with long-term current use of insulin ([address] 2/5/2018    Type 2 diabetes mellitus without complication ([facility])     Vitamin D deficiency       Patient Active Problem List   Diagnosis    Allergic rhinitis    Allergy to mold spores    Attention deficit hyperactivity disorder (ADHD)    CKD (chronic kidney disease) stage 2, GFR 60-89 ml/min    Down syndrome    Essential hypertension    Hypothyroidism    Obesity    Pes cavus    Vitamin D deficiency    Type 2 diabetes mellitus with diabetic polyneuropathy, with long-term current use of insulin ([facility])    Insulin resistance    Hyperlipidemia     Past Surgical History:   Procedure Laterality Date    APPENDECTOMY      TYMPANOSTOMY TUBE PLACEMENT       Social History     Socioeconomic History    Marital status: Single     Spouse name: Not on file    Number of children: Not on file    Years of education: Not on file    Highest education level: Not on file   Occupational History    Not on file   Social Needs    Financial resource strain: Not on file    Food insecurity     Worry: Not on file     Inability: Not on file    Transportation needs     Medical: Not on file     Non-medical: Not on file   Tobacco Use    Smoking status: Never Smoker    Smokeless tobacco: Never Used   Substance and Sexual Activity    Alcohol use: No    Drug use: No    Sexual activity: Not on file   Lifestyle    Physical activity     Days per week: Not on file     Minutes per session: Not on file    Stress: Not on file   Relationships    Social connections     Talks on phone: Not on file     Gets together: Not on file     Attends Yazdanism service: Not on file     Active member of club or organization: Not on file     Attends meetings of clubs or organizations: Not on file     Relationship status: Not on file    Intimate partner violence     Fear of current or ex partner: Not on file     Emotionally abused: Not on file     Physically abused: Not on file     Forced sexual activity: Not on file   Other Topics Concern    Not on file   Social History Narrative    Not on file     Family History   Problem Relation Age of Onset    Diabetes Mother     High Blood Pressure Mother     High Blood Pressure Father     Other Maternal Grandmother         lymphoma    Heart Disease Maternal Grandfather      Current Outpatient Medications   Medication Sig Dispense Refill    Insulin Syringe-Needle U-100 (INSULIN SYRINGE 1CC/31GX5/16\") 31G X 5/16\" 1 ML MISC 1 each by Does not apply route daily 100 Syringe 6    metFORMIN (GLUCOPHAGE-XR) 500 MG extended release tablet 2 TABLETS 2 TIMES A  tablet 2    HUMULIN R 500 UNIT/ML concentrated injection vial INJECT 175 UNITS(DRAW UP TO 35 UNIT LINE ON U-100 SYRINGE) THREE TIMES A DAY **MAY REFILL** 20 mL 5    lisinopril-hydroCHLOROthiazide (PRINZIDE;ZESTORETIC) 10-12.5 MG per tablet 1 TABLET ONCE DAILY (NEW RX OR MD NEEDS TO BE CONTACTED FOR MORE REFILLS) 30 tablet 5    Icosapent Ethyl (VASCEPA) 1 g CAPS capsule 2 CAPSULES BY MOUTH 2 TIMES A DAY (PRESCRIBER OK IS NEEDED FOR NEXT FILL) 120 capsule 2    blood glucose test strips (FREESTYLE LITE) strip USE TO CHECK BLOOD SUGAR 6 TIMES DAILY 600 strip 5    vitamin D (ERGOCALCIFEROL) 1.25 MG (64846 UT) CAPS capsule TAKE 1 CAPSULE (50,000 UNITS TOTAL) BY MOUTH ONCE A WEEK. (PRESCRIBER OK IS NEEDED FOR NEXT FILL) 4 capsule 5    levothyroxine (SYNTHROID) 125 MCG tablet Take 1 tablet by mouth daily 90 tablet 1    Insulin Syringe-Needle U-100 (TRUEPLUS INSULIN SYRINGE) 31G X 5/16\" 0.5 ML MISC USE WITH INSULIN THREE TIMES A DAY **MAY REFILL** 300 each 5    Lancets MISC Use 4 times a day      traZODone (DESYREL) 100 MG tablet TAKE 1 TO 2 TABLETS BY MOUTH AT BEDTIME **MAY REFILL** **INCREASE IN DIRECTIONS**      venlafaxine (EFFEXOR XR) 150 MG extended release capsule Take 150 mg by mouth      Liraglutide (VICTOZA) 18 MG/3ML SOPN SC injection Inject 1.8mg daily into the skin 3 pen 3     No current facility-administered medications for this visit. Allergies   Allergen Reactions    Amoxicillin      Bladder infection       ROS   I have reviewed the review of system questionnaire filled by the patient .   Patient was advised to contact PCP for non endocrine signs and symptoms         OBJECTIVE:  Vitals:    01/18/23 1420   BP: (!) 101/56   Pulse: (!) 101   Resp: 16       Wt Readings from Last 3 Encounters:   01/18/23 (!) 376 lb (170.6 kg)   11/09/22 (!) 376 lb (170.6 kg)   10/05/22 (!) 386 lb (175.1 kg)     Constitutional: no acute distress, well appearing, well nourished  Psychiatric: oriented to person, place and time, judgement, insight and normal, recent and remote memory and intact and mood, affect are normal  Skin: skin and subcutaneous tissue is normal without mass,   Head and Face: examination of head and face revealed no abnormalities  Eyes: no lid or conjunctival swelling, no erythema or discharge, pupils are normal,   Ears/Nose: external inspection of ears and nose revealed no abnormalities, hearing is grossly normal  Oropharynx/Mouth/Face: lips, tongue and gums are normal with no lesions, the voice quality was normal  Neck: neck is supple and symmetric, with midline trachea and no masses, thyroid is nonpalpable due to body habitus  Pulmonary: no increased work of breathing or signs of respiratory distress, lungs are clear to auscultation  Cardiovascular: normal heart rate and rhythm, normal S1 and S2,   Musculoskeletal: normal gait and station,   Neurological: normal coordination, normal general cortical function      Lab Results   Component Value Date    LABA1C 8.6 05/03/2021    LABA1C 8.1 01/19/2021    LABA1C 8.8 01/24/2020         ASSESSMENT/PLAN:      Uncontrolled TYPE 2 DIABETES WITH COMPLICATIONS - Microalbuminuria --nephropathy a1c 7.5>>8.8>>7.8>>8.2>>8.8>>8.1>>7.8   A1c 7.7  LYNNETTE is on U 500 insulin he has been taking 55---70  units TID  via insulin syringe  --- sent prescription for U500 syringes. --Discussed kiara Melton again he is not interested  Start Synjardy 12.5/1000 mg twice a day, patient advised to get renal panel done in 2 to 3 weeks after starting Synjardy. On metformin 1000 twice a day stop metformin  On OZempic 1 mg weekly will increase the dose to 2 mg weekly  RX sent   Today again we discussed dietary modification and the need to change eating habits   Hypoglycemia protocol reviewed in detail with patient Patient was advised to carry glucose tablets and also have glucagon emergency kit. Provided with RX for glucagon. Patient was advised that sending of his fingerstick blood glucose logs is crucial in management of his diabetes. We will adjust his dose of insulin according to sent data.      Health maintenance   -advised to have annual dilated eye exam 2018 no retinopathy   - microalb/creat ratio was elevated in  april 2019  --saw Dr Alyx Nicole in the past, most recent value still elevated at 125  -foot exam performed  follows with Podiatry Dr Nette Mosher every 3 month ---      -for his [place] hump I did MSCx 2 which came back abnl I am not positive if he even did at the right time saw her in jan 2018 .  -1 mg dex suppression test was normal cortisol was <0.1.   --- repeat 24 hr urine cortisol in August 2019 was again normal    CAD s/p stents  Follows with cardiology       Hypothyroidism   On 112 mcg daily tsh 1.31 in august 2021   ---on Lt4 125 mcg daily and recheck labs at follow up in 3 months   --Patient has been taking his medication regularly he denies any use of iron or calcium with his thyroid medication   Pt appears clinically and biochemically euthyroid on current dose of Levothyroxine , will continue on same dose. Pt was advised to avoid taking Levothyroxine with concomitant Iron and calcium containing supplements and try taking 1/2 hr before eating.    --thyroid uls was normal 8/2014   his Abs were negative. Elevated LFTS now getting better   usg showed fatty liver   Discussed the need to follow up with PCP   Discussed weight loss in detail     Autism   stable     Hypertension   He is on Losaratn /hctz  Lisinopril stopped in jan 2022 due to dry cough       Hyperlipidemia hypertriglyceridemia   Start Crestor 40 mg daily      VIT D DEF   levels improved from 5 ----24 >>26 >>28>>40   Advised to take 5000 iu dialy   advised to increase sun exposure     OBesity   Advised to step up his diet and exercise. He lost 20 lbs in summer 2016   I had a lengthy discussion with the patient regarding caloric restriction as well as stepping up exercise. We discussed caloric goal in detail. Also discussed with patient the utility of mobile phone apps like \" my fitness Pal \" or \"Lose It \". Patient verbalized understanding and agrees to work on this aspect. he has no stigma of  Cushing disease or untreated thyroid disorder.    Previous workup was negative     Depression   On effexor and welbutrin as per PCP      Reviewed and/or ordered clinical lab results Yes  Reviewed and/or ordered radiology tests Yes  Reviewed and/or ordered other diagnostic tests Yes  Made a decision to obtain old records Yes  Reviewed and summarized old records Yes      Jenny Stone was counseled regarding symptoms of current diagnosis, course and complications of disease if inadequately treated, side effects of medications, diagnosis, treatment options, and prognosis, risks, benefits, complications, and alternatives of treatment, labs, imaging and other studies and treatment targets and goals. He understands instructions and counseling. These diagnosis were discussed and reviewed with the patient including the advantages of drug therapy. He was counseled at this visit on the following: diabetes complication prevention and foot care. I spent  40 + minutes which includes reviewing patient chart , interpreting previous lab results  , discussing and providing counseling and coordinating care of patient's multiple health issues with  the patient. Return in about 3 months (around 8/5/2021).

## 2023-01-18 NOTE — PROGRESS NOTES
Mahesh Carlos is a 37 y.o. male who follows in endocrine clinic for uncontrolled Type 2 diabetes mellitus, ess hypertension ,hyperlipidemia, hypothyroidism  and  severe obesity . He was  diagnosed with diabetes at age 17 years he was started on Oral pills metfromin for years and then he was switched to insulin he was on 70/30 combination LYNNETTE is a high function autistic adult , he was  followed at [facility] in the past , and he has a twin brother who is a diabetic too. Pt  lives with his mom but still fixes his own meals and skips his insulin doses occasionally and doesn't follow a diabetic diet and doesn't exercise much either ---Mom is a diabetic too but she lets Shellie Hebert make his own decision and according to mom she has noticed a lot of noncompliance on pt's part . He is also noted to have Vit d level was only 5 initially   He also has hypertension and is on meds   He was diagnoseed with ADHD and follows with his PCP   He  lost 40 lbs in summer 2016 and his finger stick blood glucose  improved , he gained  the weight back   Pt also has hyperlipidemia and is on medication  He is diagnosed with coronary artery disease status post stents in August 2022 and now follows with cardiology    INTERIM:    Denies any hypoglycemia   Has been  using less U500   He has lost 20 lbs since last visit   Happy with diabetes control  Struggle with general just infection while he was taking Synjardy but according to the mom he always had this problem prior to starting Bailey he will discuss it with urology and then get back with me.     Past Medical History:   Diagnosis Date    ADHD (attention deficit hyperactivity disorder)     Autism     Chicken pox     CKD (chronic kidney disease)     Depression     Down syndrome     Flu 03/06/2019    Hx: UTI (urinary tract infection)     Hyperlipidemia     Hyperlipidemia 8/23/2018    Hypertension     Hypothyroidism     Insulin resistance 2/5/2018    Microalbuminuria     Obesity     Type 2 diabetes mellitus with diabetic polyneuropathy, with long-term current use of insulin (formerly [facility]) 2/5/2018    Type 2 diabetes mellitus without complication (formerly [facility])     Vitamin D deficiency       Patient Active Problem List   Diagnosis    Allergic rhinitis    Allergy to mold spores    Attention deficit hyperactivity disorder (ADHD)    CKD (chronic kidney disease) stage 2, GFR 60-89 ml/min    Down syndrome    Essential hypertension    Hypothyroidism    Obesity    Pes cavus    Vitamin D deficiency    Type 2 diabetes mellitus with diabetic polyneuropathy, with long-term current use of insulin (formerly [facility])    Insulin resistance    Hyperlipidemia     Past Surgical History:   Procedure Laterality Date    APPENDECTOMY      TYMPANOSTOMY TUBE PLACEMENT       Social History     Socioeconomic History    Marital status: Single     Spouse name: Not on file    Number of children: Not on file    Years of education: Not on file    Highest education level: Not on file   Occupational History    Not on file   Social Needs    Financial resource strain: Not on file    Food insecurity     Worry: Not on file     Inability: Not on file    Transportation needs     Medical: Not on file     Non-medical: Not on file   Tobacco Use    Smoking status: Never Smoker    Smokeless tobacco: Never Used   Substance and Sexual Activity    Alcohol use: No    Drug use: No    Sexual activity: Not on file   Lifestyle    Physical activity     Days per week: Not on file     Minutes per session: Not on file    Stress: Not on file   Relationships    Social connections     Talks on phone: Not on file     Gets together: Not on file     Attends [employer] service: Not on file     Active member of club or organization: Not on file     Attends meetings of clubs or organizations: Not on file     Relationship status: Not on file    Intimate partner violence     Fear of current or ex partner: Not on file     Emotionally abused: Not on file     Physically abused: Not on file     Forced  sexual activity: Not on file   Other Topics Concern    Not on file   Social History Narrative    Not on file     Family History   Problem Relation Age of Onset    Diabetes Mother     High Blood Pressure Mother     High Blood Pressure Father     Other Maternal Grandmother         lymphoma    Heart Disease Maternal Grandfather      Current Outpatient Medications   Medication Sig Dispense Refill    Insulin Syringe-Needle U-100 (INSULIN SYRINGE 1CC/31GX5/16\") 31G X 5/16\" 1 ML MISC 1 each by Does not apply route daily 100 Syringe 6    metFORMIN (GLUCOPHAGE-XR) 500 MG extended release tablet 2 TABLETS 2 TIMES A  tablet 2    HUMULIN R 500 UNIT/ML concentrated injection vial INJECT 175 UNITS(DRAW UP TO 35 UNIT LINE ON U-100 SYRINGE) THREE TIMES A DAY **MAY REFILL** 20 mL 5    lisinopril-hydroCHLOROthiazide (PRINZIDE;ZESTORETIC) 10-12.5 MG per tablet 1 TABLET ONCE DAILY (NEW RX OR MD NEEDS TO BE CONTACTED FOR MORE REFILLS) 30 tablet 5    Icosapent Ethyl (VASCEPA) 1 g CAPS capsule 2 CAPSULES BY MOUTH 2 TIMES A DAY (PRESCRIBER OK IS NEEDED FOR NEXT FILL) 120 capsule 2    blood glucose test strips (FREESTYLE LITE) strip USE TO CHECK BLOOD SUGAR 6 TIMES DAILY 600 strip 5    vitamin D (ERGOCALCIFEROL) 1.25 MG (60124 UT) CAPS capsule TAKE 1 CAPSULE (50,000 UNITS TOTAL) BY MOUTH ONCE A WEEK.  (PRESCRIBER OK IS NEEDED FOR NEXT FILL) 4 capsule 5    levothyroxine (SYNTHROID) 125 MCG tablet Take 1 tablet by mouth daily 90 tablet 1    Insulin Syringe-Needle U-100 (TRUEPLUS INSULIN SYRINGE) 31G X 5/16\" 0.5 ML MISC USE WITH INSULIN THREE TIMES A DAY **MAY REFILL** 300 each 5    Lancets MISC Use 4 times a day      traZODone (DESYREL) 100 MG tablet TAKE 1 TO 2 TABLETS BY MOUTH AT BEDTIME **MAY REFILL** **INCREASE IN DIRECTIONS**      venlafaxine (EFFEXOR XR) 150 MG extended release capsule Take 150 mg by mouth      Liraglutide (VICTOZA) 18 MG/3ML SOPN SC injection Inject 1.8mg daily into the skin 3 pen 3     No current facility-administered medications for this visit. Allergies   Allergen Reactions    Amoxicillin      Bladder infection       ROS   I have reviewed the review of system questionnaire filled by the patient .   Patient was advised to contact PCP for non endocrine signs and symptoms         OBJECTIVE:  Vitals:    01/18/23 1420   BP: (!) 101/56   Pulse: (!) 101   Resp: 16       Wt Readings from Last 3 Encounters:   01/18/23 (!) 367 lb (166.5 kg)   11/09/22 (!) 376 lb (170.6 kg)   10/05/22 (!) 386 lb (175.1 kg)     Constitutional: no acute distress, well appearing, well nourished  Psychiatric: oriented to person, place and time, judgement, insight and normal, recent and remote memory and intact and mood, affect are normal  Skin: skin and subcutaneous tissue is normal without mass,   Head and Face: examination of head and face revealed no abnormalities  Eyes: no lid or conjunctival swelling, no erythema or discharge, pupils are normal,   Ears/Nose: external inspection of ears and nose revealed no abnormalities, hearing is grossly normal  Oropharynx/Mouth/Face: lips, tongue and gums are normal with no lesions, the voice quality was normal  Neck: neck is supple and symmetric, with midline trachea and no masses, thyroid is nonpalpable due to body habitus  Pulmonary: no increased work of breathing or signs of respiratory distress, lungs are clear to auscultation  Cardiovascular: normal heart rate and rhythm, normal S1 and S2,   Musculoskeletal: normal gait and station,   Neurological: normal coordination, normal general cortical function        ASSESSMENT/PLAN:      Uncontrolled TYPE 2 DIABETES WITH COMPLICATIONS - Microalbuminuria --nephropathy a1c 7.5>>8.8>>7.8>>8.2>>8.8>>8.1>>7.8   A1c 7.7  LYNNETTE is on U 500 insulin he has been taking 15--35   units TID  via insulin syringe, he has reduced the dose as he has been losing weight controlled improved significantly since the addition of Synjardy but unfortunately we have to stop it due to genital yeast infection. ---stopped  Synjardy due to yeast infection   ---On metformin ER 1000 twice a day  reduce the dose to 500 Er with breakfast and dinner and see if it improves diarrhea otherwise he can stop it if the diarrhea persist.  On OZempic 2 mg weekly patient continues to lose weight and has lost approximately 20 pounds in the last 6 months. Today again we discussed dietary modification and the need to change eating habits   Hypoglycemia protocol reviewed in detail with patient Patient was advised to carry glucose tablets and also have glucagon emergency kit. Provided with RX for glucagon. Patient was advised that sending of his fingerstick blood glucose logs is crucial in management of his diabetes. We will adjust his dose of insulin according to sent data. Health maintenance   -advised to have annual dilated eye exam 2018 no retinopathy   - microalb/creat ratio was elevated in  april 2019  --saw Dr Alyx Nicole in the past, most recent value still elevated at 125  -foot exam performed  follows with Podiatry Dr Matthew Haney every 3 month ---      -for his buffalo hump I did MSCx 2 which came back abnl I am not positive if he even did at the right time saw her in jan 2018 .  -1 mg dex suppression test was normal cortisol was <0.1.   --- repeat 24 hr urine cortisol in August 2019 was again normal.    CAD s/p stents  Follows with cardiology     Hypothyroidism   On 112 mcg daily tsh normal in January 2023    --Patient has been taking his medication regularly he denies any use of iron or calcium with his thyroid medication   Pt appears clinically and biochemically euthyroid on current dose of Levothyroxine , will continue on same dose. --thyroid uls was normal 8/2014   his Abs were negative.      Elevated LFTS now getting better   usg showed fatty liver   Discussed the need to follow up with PCP   Discussed weight loss in detail     Autism   stable     Hypertension   He is on Losaratn /hctz  Lisinopril stopped in jan 2022 due to dry cough       Hyperlipidemia hypertriglyceridemia   Start Crestor 40 mg daily      VIT D DEF   levels improved from 5 ----24 >>26 >>28>>40   Advised to take 5000 iu dialy   advised to increase sun exposure     OBesity   Advised to step up his diet and exercise. He lost 20 lbs in summer 2016   I had a lengthy discussion with the patient regarding caloric restriction as well as stepping up exercise. We discussed caloric goal in detail. Also discussed with patient the utility of mobile phone apps like \" my fitness Pal \" or \"Lose It \". Patient verbalized understanding and agrees to work on this aspect. he has no stigma of  Cushing disease or untreated thyroid disorder. Previous workup was negative     Depression   On effexor and welbutrin as per PCP      Reviewed and/or ordered clinical lab results Yes  Reviewed and/or ordered radiology tests Yes  Reviewed and/or ordered other diagnostic tests Yes  Made a decision to obtain old records Yes  Reviewed and summarized old records Yes      Jeri Gregorio was counseled regarding symptoms of current diagnosis, course and complications of disease if inadequately treated, side effects of medications, diagnosis, treatment options, and prognosis, risks, benefits, complications, and alternatives of treatment, labs, imaging and other studies and treatment targets and goals. He understands instructions and counseling. These diagnosis were discussed and reviewed with the patient including the advantages of drug therapy. He was counseled at this visit on the following: diabetes complication prevention and foot care. I spent  40 + minutes which includes reviewing patient chart , interpreting previous lab results  , discussing and providing counseling and coordinating care of patient's multiple health issues with  the patient. Return in about 3 months (around 8/5/2021).

## 2023-01-25 NOTE — PROGRESS NOTES
[address]  2/2/23  Referring: Dr. Artemus Gitelman CONSULT/CHIEF COMPLAINT/HPI     Reason for visit/ Chief complaint   SOB, lightheadedness/ dizziness    HPI Christiana Galdamez is a 30 y. o.here for re-evaluation of CAD after having a recent LAD stent in 2022. He has had diabetes for over 20 years. History is complicated by his autism. He had a LHC in August 22' by Dr Samantha Crespo with PCI to LAD. Today he states he has SOB with exertion. Denies CP, lightheaded when he stands sometimes. Patient is adherent with medications and is tolerating them well without side effects     HISTORY/ALLERGIES/ROS     MedHx:  has a past medical history of ADHD (attention deficit hyperactivity disorder), Autism, Chicken pox, CKD (chronic kidney disease), Depression, Flu, Hx: UTI (urinary tract infection), Hyperlipidemia, Hyperlipidemia, Hypertension, Hypothyroidism, Insulin resistance, Microalbuminuria, Obesity, Type 2 diabetes mellitus with diabetic polyneuropathy, with long-term current use of insulin ([facility] [address] Type 2 diabetes mellitus without complication ([facility] [address] and Vitamin D deficiency. SurgHx:  has a past surgical history that includes Tympanostomy tube placement and Appendectomy. SocHx:  reports that he has never smoked. He has never used smokeless tobacco. He reports current alcohol use. He reports that he does not use drugs. FamHx: Mother had CAD and stents at age 39  Allergies: Amoxicillin and Lisinopril     MEDICATIONS      Prior to Admission medications    Medication Sig Start Date End Date Taking?  Authorizing Provider   VASCEPA 1 g CAPS capsule 2 CAPSULES BY MOUTH 2 TIMES A DAY 1/26/23  Yes Bertha Hampton MD   levothyroxine (SYNTHROID) 125 MCG tablet TAKE ONE TABLET DAILY **NO REFILLS-PRESCRIBER OK NEEDED FOR NEXT REFILL** 1/26/23  Yes Bertha Hampton MD   metFORMIN (GLUCOPHAGE) 1000 MG tablet Take 1,000 mg by mouth 2 times daily (with meals)   Yes Historical Provider, MD losartan-hydroCHLOROthiazide (HYZAAR) 50-12.5 MG per tablet 1 TABLET ONCE DAILY (REPLACES LINSINOPRIL HCTZ) 1/6/23  Yes Bertha Hampton MD   vitamin D (ERGOCALCIFEROL) 1.25 MG (06458 UT) CAPS capsule 1 CAPSULE BY MOUTH ONCE EACH WEEK (PRESCRIBER OK IS NEEDED FOR NEXT FILL) 12/13/22  Yes Bertha Hampton MD   insulin regular human (HUMULIN R) 500 UNIT/ML concentrated injection vial INJECT 275 UNITS(DRAW UP TO 55 UNIT LINE ON U-100 SYRINGE) THREE TIMES A DAY 10/27/22  Yes Bertha Hampton MD   rosuvastatin (CRESTOR) 40 MG tablet Take 1 tablet by mouth daily 10/5/22 10/5/23 Yes Bertha Hampton MD   Empagliflozin-metFORMIN HCl ER 12.5-1000 MG TB24 Take 1 tab BID 10/5/22  Yes Bertha Hampton MD   Semaglutide, 2 MG/DOSE, 8 MG/3ML SOPN 2mg weekly 10/5/22  Yes Bertha Hampton MD   methocarbamol (ROBAXIN) 750 MG tablet Take 750 mg by mouth 4 times daily   Yes Historical Provider, MD   Insulin Syringe-Needle U-100 (BD INSULIN SYRINGE U/F) 31G X 5/16\" 1 ML MISC INSULIN SYRINGES 1ML SIZE USE FOR INSULIN INJECTION 3 TIMES A DAY (PRESCRIBER OK IS NEEDED FOR NEXT FILL) 8/29/22  Yes Bertha Hampton MD   clopidogrel (PLAVIX) 75 MG tablet Take 1 tablet by mouth daily 8/25/22  Yes Nick Jane MD   metoprolol tartrate (LOPRESSOR) 25 MG tablet Take 1 tablet by mouth 2 times daily 8/25/22  Yes Nick Jane MD   aspirin 81 MG EC tablet Take 81 mg by mouth in the morning. Yes Historical Provider, MD   loratadine (CLARITIN) 10 MG tablet Take 10 mg by mouth in the morning.    Yes Historical Provider, MD   blood glucose test strips (ONETOUCH ULTRA) strip TEST 6 TIMES A DAY AND AS NEEDED FOR SYMPTOMS OF IRREGULAR BLOOD SUGAR 5/25/22  Yes Bertha Hampton MD   Insulin Pen Needle ([employer] PEN NEEDLES 31G) 31G X 8 MM MISC 1 each by Does not apply route daily 5/25/22  Yes Bertha Hampton MD   Insulin Syringe/Needle U-500 (BD INSULIN SYRINGE U-500) 31G X 6MM 0.5 ML MISC Use with U500 insulin TID 5/25/22  Yes Bertha Hampton MD   Blood Glucose Monitoring Suppl [facility] ULTRA 2) w/Device KIT 1 kit by Does not apply route daily 9/21/21  Yes Sarahy Powell MD   Lancets MISC 1 each by Does not apply route daily 9/21/21  Yes Sarahy Powell MD   traZODone (DESYREL) 100 MG tablet TAKE 1 TO 2 TABLETS BY MOUTH AT BEDTIME **MAY REFILL** **INCREASE IN DIRECTIONS** 7/10/17  Yes Historical Provider, MD   venlafaxine (EFFEXOR XR) 150 MG extended release capsule Take 150 mg by mouth daily 12/2/10  Yes Historical Provider, MD   hydroCHLOROthiazide (HYDRODIURIL) 25 MG tablet 1 TABLET BY MOUTH ONCE DAILY **MAY REFILL**  Patient not taking: No sig reported 12/27/21 8/25/22  Historical Provider, MD   lisinopril-hydroCHLOROthiazide (PRINZIDE;ZESTORETIC) 10-12.5 MG per tablet 1 TABLET ONCE DAILY (PRESCRIBER OK IS NEEDED FOR NEXT FILL)  Patient not taking: No sig reported 9/9/21 8/25/22  Sarahy Powell MD       PHYSICAL EXAM        Vitals:    02/02/23 1123   BP: 136/80   Pulse: 81   SpO2: 97%      Weight: (!) 370 lb 12.8 oz (168.2 kg)     Gen Alert, cooperative, no distress. Very tall. Morbidly obese    Body mass index is 41.77 kg/m².    CV Regular rate and rhythm, no murmur   Head Normocephalic, atraumatic, no abnormalities Abd  Soft, NT, +BS, no mass, no OM   Eyes PERRLA, conj/corn clear Ext  Ext nl, AT, no C/C, no edema   Nose Nares normal, no drain age, Non-tender Pulse 2+ and symmetric   Throat Lips, mucosa, tongue normal Skin Color/text/turg nl, no rash/lesions   Neck S/S, TM, NT, no bruit Psych Nl mood and affect   Lung CTA-B, unlabored, no DTP     Ch wall NT, no deform       LABS and Imaging     Relevant and available CV data reviewed    Lipid 1/6/23 TC82  HDL29 LDL26   A1c 1/6/23 6.4  TSH 1/6/23 0.62    EKG 2/2/23   Sinus  Rhythm   WITHIN NORMAL LIMITS    EKG personally interpreted: 8/25/22  Normal sinus rhythmNormal     Cath 8/25/22 PSC  Anatomy:   LM-nml   LAD-prox 50%, mid 80%, distal 90%  Cx-nml  OM- nml  RCA-dom nml  RPDA- nml  LVEF- 60  LVG- 65%  LVEDP- 15   Intervention  ~Successful PCI to LAD with 3.0x38 Bacilio, 3.5x33 BACILIO. Excellent Result. Impression  ~Coronary Angiography w/ severe single vessel CAD  ~LVG with LVEF of 65 and no regional wall motion abnormalities  ~Successful complex angioplasty and stenting of LAD     Stress from [address] reviewed:   Treadmill 7/15/22  1. Myocardial perfusion imaging was performed in conjunction and      will be reported separately. 2. Stress ECG conclusions: [facility] scoring: exercise time of 6min;      maximum ST deviation of 0mm; angina present but did not limit      exercise; resulting score is 2. This score predicts a moderate      risk of cardiac events. Impressions:  No EKG changes diagnostic for ischemia noted with   exercise. Moderate risk of future CAD events based on [facility]   treadmill score of 2. Nuclear portion 7/15/22 CE  Area of fixed perfusion defect seen in the anteroseptal region with associated wall motion defect is most compatible with prior infarct. However, the should be correlated with patient's clinical history and there is questionable degree of a marginal degree of reperfusion and element of acute ischemia is difficult to exclude. There is resulting mildly diminished ejection fraction. 54% ejection fraction. Outside/Care everywhere records Reviewed  Labs Reviewed  Prior Imaging, ekg, cath, echo reviewed when available  Medications reviewed  Old Notes reviewed  ASSESSMENT AND PLAN     Shortness of breath/chest discomfort  EKG w/o evidence of stent thrombosis  Plavix 75mg daily, Metoprolol 25mg BID, asa   Plan:     Continue asa and Plavix. We will stop the Plavix 6mths s/p stenting. He can stop it  March 1, 2023. Echo to evaluate symptoms as he may have CHF    2. Lightheadedness/dizziness   pending cath results will do additional workup if needed  Plan: Continue to monitor     3. Essential HTN  /80 (Site: Left Upper Arm, Position: Sitting, Cuff Size: Large Adult)   Pulse 81   Ht 6' 7\" (2.007 m)   Wt (!) 370 lb 12.8 oz (168.2 kg)   SpO2 97%   BMI 41.77 kg/m²   Hyzaar 50-12.5mg   Plan: well controlled. Continued Hyzaar     4. MHyperlipidemia   5/19/22 ,  HDL 33   Vascepa 2g BID and Added atorvastatin 80 mg today  LDL goal should be < 70 given DM. Plan: Continue Vascepa and Atorvastatin     5. Type 2 DM/ Insulin resistance   5/19/22 A1c 8.0  Semaglutide, Metformin 1000mg BID   Plan. Follow up with Endocrinologist Mata Loza    6. Severe Obesity  BMI 41.34  Plan: Patient counseled on lifestyle modification, diet, and exercise. Follow Up: 1 year follow up unless testing is abnormal     Frantz Navarro MD  Cardiologist [place] [address]    Physician Attestation  The scribe wrote this note in the presence of me Frantz Navarro MD). The scribe may have prepopulated components of this note with my historical  intellectual property under my direct supervision. Any additions to this intellectual property were performed in my presence and at my direction. Furthermore, the content and accuracy of this note have been reviewed by me with edits by me as needed.   Frantz Navarro MD 2/2/2023 5:16 PM

## 2023-01-26 DIAGNOSIS — E03.8 HYPOTHYROIDISM DUE TO HASHIMOTO'S THYROIDITIS: ICD-10-CM

## 2023-01-26 DIAGNOSIS — E78.2 MIXED HYPERLIPIDEMIA: ICD-10-CM

## 2023-01-26 DIAGNOSIS — E06.3 HYPOTHYROIDISM DUE TO HASHIMOTO'S THYROIDITIS: ICD-10-CM

## 2023-01-26 RX ORDER — ICOSAPENT ETHYL 1000 MG/1
CAPSULE ORAL
Qty: 120 CAPSULE | Refills: 5 | Status: SHIPPED | OUTPATIENT
Start: 2023-01-26

## 2023-01-26 RX ORDER — LEVOTHYROXINE SODIUM 0.12 MG/1
TABLET ORAL
Qty: 30 TABLET | Refills: 5 | Status: SHIPPED | OUTPATIENT
Start: 2023-01-26

## 2023-02-01 PROBLEM — E78.2 MIXED HYPERLIPIDEMIA: Status: ACTIVE | Noted: 2018-08-23

## 2023-02-01 PROBLEM — I25.10 CORONARY ARTERY DISEASE DUE TO LIPID RICH PLAQUE: Status: ACTIVE | Noted: 2023-02-01

## 2023-02-01 PROBLEM — I25.83 CORONARY ARTERY DISEASE DUE TO LIPID RICH PLAQUE: Status: ACTIVE | Noted: 2023-02-01

## 2023-02-02 ENCOUNTER — OFFICE VISIT (OUTPATIENT)
Dept: CARDIOLOGY CLINIC | Age: 38
End: 2023-02-02
Payer: COMMERCIAL

## 2023-02-02 VITALS
OXYGEN SATURATION: 97 % | HEART RATE: 81 BPM | BODY MASS INDEX: 36.45 KG/M2 | WEIGHT: 315 LBS | SYSTOLIC BLOOD PRESSURE: 136 MMHG | DIASTOLIC BLOOD PRESSURE: 80 MMHG | HEIGHT: 78 IN

## 2023-02-02 DIAGNOSIS — E66.01 CLASS 3 SEVERE OBESITY DUE TO EXCESS CALORIES WITH SERIOUS COMORBIDITY AND BODY MASS INDEX (BMI) OF 40.0 TO 44.9 IN ADULT (HCC): ICD-10-CM

## 2023-02-02 DIAGNOSIS — I25.83 CORONARY ARTERY DISEASE DUE TO LIPID RICH PLAQUE: Primary | ICD-10-CM

## 2023-02-02 DIAGNOSIS — E78.2 MIXED HYPERLIPIDEMIA: ICD-10-CM

## 2023-02-02 DIAGNOSIS — I10 ESSENTIAL HYPERTENSION: ICD-10-CM

## 2023-02-02 DIAGNOSIS — E88.81 INSULIN RESISTANCE: ICD-10-CM

## 2023-02-02 DIAGNOSIS — R06.02 SOB (SHORTNESS OF BREATH): ICD-10-CM

## 2023-02-02 DIAGNOSIS — R42 LIGHTHEADEDNESS: ICD-10-CM

## 2023-02-02 DIAGNOSIS — E11.42 TYPE 2 DIABETES MELLITUS WITH DIABETIC POLYNEUROPATHY, WITH LONG-TERM CURRENT USE OF INSULIN (HCC): ICD-10-CM

## 2023-02-02 DIAGNOSIS — I25.10 CORONARY ARTERY DISEASE DUE TO LIPID RICH PLAQUE: Primary | ICD-10-CM

## 2023-02-02 DIAGNOSIS — Z79.4 TYPE 2 DIABETES MELLITUS WITH DIABETIC POLYNEUROPATHY, WITH LONG-TERM CURRENT USE OF INSULIN (HCC): ICD-10-CM

## 2023-02-02 PROCEDURE — 3044F HG A1C LEVEL LT 7.0%: CPT | Performed by: INTERNAL MEDICINE

## 2023-02-02 PROCEDURE — 3075F SYST BP GE 130 - 139MM HG: CPT | Performed by: INTERNAL MEDICINE

## 2023-02-02 PROCEDURE — 93000 ELECTROCARDIOGRAM COMPLETE: CPT | Performed by: INTERNAL MEDICINE

## 2023-02-02 PROCEDURE — 99214 OFFICE O/P EST MOD 30 MIN: CPT | Performed by: INTERNAL MEDICINE

## 2023-02-02 PROCEDURE — 3079F DIAST BP 80-89 MM HG: CPT | Performed by: INTERNAL MEDICINE

## 2023-02-02 NOTE — PATIENT INSTRUCTIONS
Follow up with Dr Cleo Camejo in 1 year   Echo soon   It is okay to stop Plavix March 1, 2023. Call for any questions or concerns.

## 2023-02-07 DIAGNOSIS — E78.2 MIXED HYPERLIPIDEMIA: Primary | ICD-10-CM

## 2023-02-07 RX ORDER — ROSUVASTATIN CALCIUM 40 MG/1
TABLET, COATED ORAL
Qty: 30 TABLET | Refills: 3 | Status: SHIPPED | OUTPATIENT
Start: 2023-02-07

## 2023-02-15 ENCOUNTER — PROCEDURE VISIT (OUTPATIENT)
Dept: CARDIOLOGY CLINIC | Age: 38
End: 2023-02-15
Payer: COMMERCIAL

## 2023-02-15 DIAGNOSIS — R06.02 SOB (SHORTNESS OF BREATH): ICD-10-CM

## 2023-02-15 DIAGNOSIS — I25.10 CORONARY ARTERY DISEASE DUE TO LIPID RICH PLAQUE: ICD-10-CM

## 2023-02-15 DIAGNOSIS — I25.83 CORONARY ARTERY DISEASE DUE TO LIPID RICH PLAQUE: ICD-10-CM

## 2023-02-15 LAB
LV EF: 58 %
LVEF MODALITY: NORMAL

## 2023-02-15 PROCEDURE — 93306 TTE W/DOPPLER COMPLETE: CPT | Performed by: INTERNAL MEDICINE

## 2023-02-22 ENCOUNTER — TELEPHONE (OUTPATIENT)
Dept: ENDOCRINOLOGY | Age: 38
End: 2023-02-22

## 2023-02-22 DIAGNOSIS — Z79.4 TYPE 2 DIABETES MELLITUS WITH DIABETIC POLYNEUROPATHY, WITH LONG-TERM CURRENT USE OF INSULIN (HCC): Primary | ICD-10-CM

## 2023-02-22 DIAGNOSIS — E11.42 TYPE 2 DIABETES MELLITUS WITH DIABETIC POLYNEUROPATHY, WITH LONG-TERM CURRENT USE OF INSULIN (HCC): Primary | ICD-10-CM

## 2023-02-22 NOTE — TELEPHONE ENCOUNTER
Pt's mother calling and states pt needs his Synjardy rx to be sent to [facility]      CB# Kashmir Ross # [phone]

## 2023-03-03 ENCOUNTER — TELEPHONE (OUTPATIENT)
Dept: ENDOCRINOLOGY | Age: 38
End: 2023-03-03

## 2023-03-03 DIAGNOSIS — Z79.4 TYPE 2 DIABETES MELLITUS WITH DIABETIC POLYNEUROPATHY, WITH LONG-TERM CURRENT USE OF INSULIN (HCC): ICD-10-CM

## 2023-03-03 DIAGNOSIS — E11.42 TYPE 2 DIABETES MELLITUS WITH DIABETIC POLYNEUROPATHY, WITH LONG-TERM CURRENT USE OF INSULIN (HCC): ICD-10-CM

## 2023-03-03 NOTE — TELEPHONE ENCOUNTER
[facility]   [facility] and requires Prior Auth

## 2023-03-06 RX ORDER — INSULIN HUMAN 500 [IU]/ML
INJECTION, SOLUTION SUBCUTANEOUS
Qty: 20 ML | Refills: 3 | Status: SHIPPED | OUTPATIENT
Start: 2023-03-06

## 2023-03-09 DIAGNOSIS — E11.42 TYPE 2 DIABETES MELLITUS WITH DIABETIC POLYNEUROPATHY, WITH LONG-TERM CURRENT USE OF INSULIN (HCC): ICD-10-CM

## 2023-03-09 DIAGNOSIS — Z79.4 TYPE 2 DIABETES MELLITUS WITH DIABETIC POLYNEUROPATHY, WITH LONG-TERM CURRENT USE OF INSULIN (HCC): ICD-10-CM

## 2023-03-09 RX ORDER — SYRINGE-NEEDLE,INSULIN,0.5 ML 27GX1/2"
SYRINGE, EMPTY DISPOSABLE MISCELLANEOUS
Qty: 100 EACH | Refills: 5 | Status: SHIPPED | OUTPATIENT
Start: 2023-03-09

## 2023-03-17 ENCOUNTER — TELEPHONE (OUTPATIENT)
Dept: CARDIOLOGY CLINIC | Age: 38
End: 2023-03-17

## 2023-03-21 ENCOUNTER — TELEPHONE (OUTPATIENT)
Dept: ENDOCRINOLOGY | Age: 38
End: 2023-03-21

## 2023-03-21 DIAGNOSIS — E11.42 TYPE 2 DIABETES MELLITUS WITH DIABETIC POLYNEUROPATHY, WITH LONG-TERM CURRENT USE OF INSULIN (HCC): Primary | ICD-10-CM

## 2023-03-21 DIAGNOSIS — Z79.4 TYPE 2 DIABETES MELLITUS WITH DIABETIC POLYNEUROPATHY, WITH LONG-TERM CURRENT USE OF INSULIN (HCC): Primary | ICD-10-CM

## 2023-03-21 RX ORDER — SYRINGE,INSUL U-500,NDL,0.5ML 31GX15/64"
SYRINGE, EMPTY DISPOSABLE MISCELLANEOUS
Qty: 100 EACH | Refills: 5 | Status: SHIPPED | OUTPATIENT
Start: 2023-03-21

## 2023-03-21 NOTE — TELEPHONE ENCOUNTER
Called Kristen to check status of PA. Advised by rep that it was cancelled. Initiated another PA by phone and asked for urgent review. 24 hr turnaround time.   PA # [id number]

## 2023-03-21 NOTE — TELEPHONE ENCOUNTER
Pt's mother calling and states pt still needs the U500 insulin sent to [facility]. It needed a Prior Auth originally    She also states that the needles he has are not the ones he likes.  He likes the ones that count in increments of 5, 10, 15 etc.    Tiarra Bower (Parent)   [phone] (Home Phone)

## 2023-05-26 ENCOUNTER — HOSPITAL ENCOUNTER (OUTPATIENT)
Age: 38
Discharge: HOME | End: 2023-05-26
Payer: COMMERCIAL

## 2023-05-26 DIAGNOSIS — E88.81 INSULIN RESISTANCE: ICD-10-CM

## 2023-05-26 DIAGNOSIS — Z79.4 TYPE 2 DIABETES MELLITUS WITH DIABETIC POLYNEUROPATHY, WITH LONG-TERM CURRENT USE OF INSULIN (HCC): ICD-10-CM

## 2023-05-26 DIAGNOSIS — E03.8 HYPOTHYROIDISM DUE TO HASHIMOTO'S THYROIDITIS: ICD-10-CM

## 2023-05-26 DIAGNOSIS — E11.42 TYPE 2 DIABETES MELLITUS WITH DIABETIC POLYNEUROPATHY, WITH LONG-TERM CURRENT USE OF INSULIN (HCC): ICD-10-CM

## 2023-05-26 DIAGNOSIS — E06.3 HYPOTHYROIDISM DUE TO HASHIMOTO'S THYROIDITIS: ICD-10-CM

## 2023-05-26 LAB
ALBUMIN SERPL-MCNC: 4.7 G/DL (ref 3.4–5)
ALBUMIN/GLOB SERPL: 1.6 {RATIO} (ref 1.1–2.2)
ALP SERPL-CCNC: 58 U/L (ref 40–129)
ALT SERPL-CCNC: 24 U/L (ref 10–40)
ANION GAP SERPL CALCULATED.3IONS-SCNC: 15 MMOL/L (ref 3–16)
AST SERPL-CCNC: 20 U/L (ref 15–37)
BILIRUB SERPL-MCNC: 0.5 MG/DL (ref 0–1)
BUN SERPL-MCNC: 8 MG/DL (ref 7–20)
CALCIUM SERPL-MCNC: 9 MG/DL (ref 8.3–10.6)
CHLORIDE SERPL-SCNC: 104 MMOL/L (ref 99–110)
CHOLEST SERPL-MCNC: 82 MG/DL (ref 0–199)
CO2 SERPL-SCNC: 23 MMOL/L (ref 21–32)
CREAT SERPL-MCNC: 1 MG/DL (ref 0.9–1.3)
CREAT UR-MCNC: 141.6 MG/DL (ref 39–259)
GFR SERPLBLD CREATININE-BSD FMLA CKD-EPI: >60 ML/MIN/{1.73_M2}
GLUCOSE SERPL-MCNC: 121 MG/DL (ref 70–99)
HDLC SERPL-MCNC: 32 MG/DL (ref 40–60)
LDLC SERPL CALC-MCNC: 24 MG/DL
MICROALBUMIN UR DL<=1MG/L-MCNC: 2.7 MG/DL
MICROALBUMIN/CREAT UR: 19.1 MG/G (ref 0–30)
POTASSIUM SERPL-SCNC: 4.2 MMOL/L (ref 3.5–5.1)
PROT SERPL-MCNC: 7.6 G/DL (ref 6.4–8.2)
SODIUM SERPL-SCNC: 142 MMOL/L (ref 136–145)
TRIGL SERPL-MCNC: 132 MG/DL (ref 0–150)
TSH SERPL DL<=0.005 MIU/L-ACNC: 0.58 UIU/ML (ref 0.27–4.2)
VLDLC SERPL CALC-MCNC: 26 MG/DL

## 2023-05-26 PROCEDURE — 36415 COLL VENOUS BLD VENIPUNCTURE: CPT

## 2023-05-26 PROCEDURE — 82043 UR ALBUMIN QUANTITATIVE: CPT

## 2023-05-26 PROCEDURE — 84443 ASSAY THYROID STIM HORMONE: CPT

## 2023-05-26 PROCEDURE — 82570 ASSAY OF URINE CREATININE: CPT

## 2023-05-26 PROCEDURE — 83036 HEMOGLOBIN GLYCOSYLATED A1C: CPT

## 2023-05-26 PROCEDURE — 80053 COMPREHEN METABOLIC PANEL: CPT

## 2023-05-26 PROCEDURE — 80061 LIPID PANEL: CPT

## 2023-05-27 LAB
EST. AVERAGE GLUCOSE BLD GHB EST-MCNC: 131.2 MG/DL
HBA1C MFR BLD: 6.2 %

## 2023-06-01 ENCOUNTER — OFFICE VISIT (OUTPATIENT)
Dept: ENDOCRINOLOGY | Age: 38
End: 2023-06-01

## 2023-06-01 VITALS
HEIGHT: 78 IN | TEMPERATURE: 98 F | DIASTOLIC BLOOD PRESSURE: 80 MMHG | SYSTOLIC BLOOD PRESSURE: 135 MMHG | WEIGHT: 315 LBS | RESPIRATION RATE: 14 BRPM | BODY MASS INDEX: 36.45 KG/M2 | HEART RATE: 75 BPM

## 2023-06-01 DIAGNOSIS — E06.3 HYPOTHYROIDISM DUE TO HASHIMOTO'S THYROIDITIS: ICD-10-CM

## 2023-06-01 DIAGNOSIS — E78.2 MIXED HYPERLIPIDEMIA: ICD-10-CM

## 2023-06-01 DIAGNOSIS — I10 ESSENTIAL HYPERTENSION: ICD-10-CM

## 2023-06-01 DIAGNOSIS — E11.42 TYPE 2 DIABETES MELLITUS WITH DIABETIC POLYNEUROPATHY, WITH LONG-TERM CURRENT USE OF INSULIN (HCC): ICD-10-CM

## 2023-06-01 DIAGNOSIS — E03.8 HYPOTHYROIDISM DUE TO HASHIMOTO'S THYROIDITIS: ICD-10-CM

## 2023-06-01 DIAGNOSIS — Z79.4 TYPE 2 DIABETES MELLITUS WITH DIABETIC POLYNEUROPATHY, WITH LONG-TERM CURRENT USE OF INSULIN (HCC): ICD-10-CM

## 2023-06-01 DIAGNOSIS — E55.9 VITAMIN D DEFICIENCY: ICD-10-CM

## 2023-06-01 RX ORDER — ERGOCALCIFEROL 1.25 MG/1
CAPSULE ORAL
Qty: 4 CAPSULE | Refills: 5 | Status: SHIPPED | OUTPATIENT
Start: 2023-06-01

## 2023-06-01 RX ORDER — LOSARTAN POTASSIUM AND HYDROCHLOROTHIAZIDE 12.5; 5 MG/1; MG/1
TABLET ORAL
Qty: 30 TABLET | Refills: 5 | Status: SHIPPED | OUTPATIENT
Start: 2023-06-01

## 2023-06-01 RX ORDER — ROSUVASTATIN CALCIUM 40 MG/1
TABLET, COATED ORAL
Qty: 30 TABLET | Refills: 5 | Status: SHIPPED | OUTPATIENT
Start: 2023-06-01

## 2023-06-01 RX ORDER — LEVOTHYROXINE SODIUM 0.12 MG/1
TABLET ORAL
Qty: 30 TABLET | Refills: 5 | Status: SHIPPED | OUTPATIENT
Start: 2023-06-01

## 2023-06-01 RX ORDER — INSULIN HUMAN 500 [IU]/ML
INJECTION, SOLUTION SUBCUTANEOUS
Qty: 20 ML | Refills: 3 | Status: SHIPPED | OUTPATIENT
Start: 2023-06-01

## 2023-06-01 RX ORDER — ICOSAPENT ETHYL 1000 MG/1
CAPSULE ORAL
Qty: 120 CAPSULE | Refills: 5 | Status: SHIPPED | OUTPATIENT
Start: 2023-06-01

## 2023-06-01 NOTE — PROGRESS NOTES
cough       Hyperlipidemia hypertriglyceridemia   Start Crestor 40 mg daily      VIT D DEF   levels improved from 5 ----24 >>26 >>28>>40   Advised to take 5000 iu dialy   advised to increase sun exposure     OBesity   Advised to step up his diet and exercise. He lost 20 lbs in summer 2016   I had a lengthy discussion with the patient regarding caloric restriction as well as stepping up exercise. We discussed caloric goal in detail. Also discussed with patient the utility of mobile phone apps like \" my fitness Pal \" or \"Lose It \". Patient verbalized understanding and agrees to work on this aspect. he has no stigma of  Cushing disease or untreated thyroid disorder. Previous workup was negative     Depression   On effexor and welbutrin as per PCP      Reviewed and/or ordered clinical lab results Yes  Reviewed and/or ordered radiology tests Yes  Reviewed and/or ordered other diagnostic tests Yes  Made a decision to obtain old records Yes  Reviewed and summarized old records Yes      Maira Joel was counseled regarding symptoms of current diagnosis, course and complications of disease if inadequately treated, side effects of medications, diagnosis, treatment options, and prognosis, risks, benefits, complications, and alternatives of treatment, labs, imaging and other studies and treatment targets and goals. He understands instructions and counseling. These diagnosis were discussed and reviewed with the patient including the advantages of drug therapy. He was counseled at this visit on the following: diabetes complication prevention and foot care. Return in about 3 months (around 8/5/2021).

## 2023-07-06 ENCOUNTER — TELEPHONE (OUTPATIENT)
Dept: ENDOCRINOLOGY | Age: 38
End: 2023-07-06

## 2023-07-06 NOTE — TELEPHONE ENCOUNTER
Jermaine ZHANG 500    Per Lanie @ [facility] advised the script states inject 50 units 3x per day/ prior scripts 275 unts 3x per day.       [facility] was just verifying the change    #[phone]    insulin regular human (HUMULIN R) 500 UNIT/ML   Sig: INJECT [address], [address] [place] [phone]   [address] [place] [address]   Phone:  [phone]  Fax:  [phone]

## 2023-08-18 ENCOUNTER — TELEPHONE (OUTPATIENT)
Dept: ENDOCRINOLOGY | Age: 38
End: 2023-08-18

## 2023-08-18 DIAGNOSIS — E11.42 TYPE 2 DIABETES MELLITUS WITH DIABETIC POLYNEUROPATHY, WITH LONG-TERM CURRENT USE OF INSULIN (HCC): ICD-10-CM

## 2023-08-18 DIAGNOSIS — Z79.4 TYPE 2 DIABETES MELLITUS WITH DIABETIC POLYNEUROPATHY, WITH LONG-TERM CURRENT USE OF INSULIN (HCC): ICD-10-CM

## 2023-08-18 NOTE — TELEPHONE ENCOUNTER
Per patients mother Dorman Jamee patient got the Humulin  R 500 and states he only has one vial left    The pharmacy will not give him anymore refills because they state he should have more than this left.     He draws up 20-40 each time he eats    insulin regular human (HUMULIN R) 500 UNIT/ML concentrated injection vial     Patient [address], [address] -  [phone]   [address] [place] [address] Hu  [address]   Phone:  [phone]  Fax:  [phone]

## 2023-08-18 NOTE — TELEPHONE ENCOUNTER
Spoke to patient's mother. She states patient is injecting 20-40 units 3 times a day. Last prescription was picked up in July and was for 1 vial. I explained that this should have lasted him at least 2 months, however she states his current vial is almost empty. Reports that his blood sugars are good, no hypoglycemic episodes, and the definitely doesn't inject more than 3 times a day. Please advise.

## 2023-08-21 ENCOUNTER — TELEPHONE (OUTPATIENT)
Dept: ENDOCRINOLOGY | Age: 38
End: 2023-08-21

## 2023-08-21 DIAGNOSIS — E11.42 TYPE 2 DIABETES MELLITUS WITH DIABETIC POLYNEUROPATHY, WITH LONG-TERM CURRENT USE OF INSULIN (HCC): ICD-10-CM

## 2023-08-21 DIAGNOSIS — Z79.4 TYPE 2 DIABETES MELLITUS WITH DIABETIC POLYNEUROPATHY, WITH LONG-TERM CURRENT USE OF INSULIN (HCC): ICD-10-CM

## 2023-08-21 RX ORDER — INSULIN HUMAN 500 [IU]/ML
INJECTION, SOLUTION SUBCUTANEOUS
Qty: 20 ML | Refills: 3 | Status: SHIPPED | OUTPATIENT
Start: 2023-08-21

## 2023-08-21 RX ORDER — BLOOD SUGAR DIAGNOSTIC
STRIP MISCELLANEOUS
Qty: 300 STRIP | Refills: 5 | Status: SHIPPED | OUTPATIENT
Start: 2023-08-21

## 2023-08-21 NOTE — TELEPHONE ENCOUNTER
Fax from [address] w/ refill request for One Touch Ultra Blue Test Strips    LOV   6-1-23  FOV   11-2-23

## 2023-08-29 RX ORDER — CLOPIDOGREL BISULFATE 75 MG/1
TABLET ORAL
Qty: 90 TABLET | Refills: 3 | OUTPATIENT
Start: 2023-08-29

## 2023-08-29 NOTE — TELEPHONE ENCOUNTER
Per [facility] 2/2/23 note,    Instructions      Return in about 1 year (around 2/2/2024). Follow up with Dr Gina Garcia in 1 year   Echo soon   It is okay to stop Plavix March 1, 2023. Call for any questions or concerns.               Last OV: 2/2/23 wak  Last Labs: 8/25/22 cbc  Last refill:8/25/22  Next appt:  none

## 2023-09-11 DIAGNOSIS — Z79.4 TYPE 2 DIABETES MELLITUS WITH DIABETIC POLYNEUROPATHY, WITH LONG-TERM CURRENT USE OF INSULIN (HCC): ICD-10-CM

## 2023-09-11 DIAGNOSIS — E11.42 TYPE 2 DIABETES MELLITUS WITH DIABETIC POLYNEUROPATHY, WITH LONG-TERM CURRENT USE OF INSULIN (HCC): ICD-10-CM

## 2023-09-11 RX ORDER — SYRINGE-NEEDLE,INSULIN,0.5 ML 27GX1/2"
SYRINGE, EMPTY DISPOSABLE MISCELLANEOUS
Qty: 100 EACH | Refills: 5 | Status: SHIPPED | OUTPATIENT
Start: 2023-09-11 | End: 2023-11-02

## 2023-09-29 DIAGNOSIS — Z79.4 TYPE 2 DIABETES MELLITUS WITH DIABETIC POLYNEUROPATHY, WITH LONG-TERM CURRENT USE OF INSULIN (HCC): ICD-10-CM

## 2023-09-29 DIAGNOSIS — E78.2 MIXED HYPERLIPIDEMIA: ICD-10-CM

## 2023-09-29 DIAGNOSIS — E11.42 TYPE 2 DIABETES MELLITUS WITH DIABETIC POLYNEUROPATHY, WITH LONG-TERM CURRENT USE OF INSULIN (HCC): ICD-10-CM

## 2023-09-29 DIAGNOSIS — I10 ESSENTIAL HYPERTENSION: ICD-10-CM

## 2023-09-29 DIAGNOSIS — E03.8 HYPOTHYROIDISM DUE TO HASHIMOTO'S THYROIDITIS: ICD-10-CM

## 2023-09-29 DIAGNOSIS — E06.3 HYPOTHYROIDISM DUE TO HASHIMOTO'S THYROIDITIS: ICD-10-CM

## 2023-09-30 LAB
ALBUMIN SERPL-MCNC: 4.7 G/DL (ref 3.4–5)
ALBUMIN/GLOB SERPL: 1.6 {RATIO} (ref 1.1–2.2)
ALP SERPL-CCNC: 65 U/L (ref 40–129)
ALT SERPL-CCNC: 26 U/L (ref 10–40)
ANION GAP SERPL CALCULATED.3IONS-SCNC: 11 MMOL/L (ref 3–16)
AST SERPL-CCNC: 22 U/L (ref 15–37)
BILIRUB SERPL-MCNC: 0.7 MG/DL (ref 0–1)
BUN SERPL-MCNC: 10 MG/DL (ref 7–20)
CALCIUM SERPL-MCNC: 9.4 MG/DL (ref 8.3–10.6)
CHLORIDE SERPL-SCNC: 102 MMOL/L (ref 99–110)
CHOLEST SERPL-MCNC: 85 MG/DL (ref 0–199)
CO2 SERPL-SCNC: 27 MMOL/L (ref 21–32)
CREAT SERPL-MCNC: 1 MG/DL (ref 0.9–1.3)
CREAT UR-MCNC: 148.5 MG/DL (ref 39–259)
EST. AVERAGE GLUCOSE BLD GHB EST-MCNC: 119.8 MG/DL
GFR SERPLBLD CREATININE-BSD FMLA CKD-EPI: >60 ML/MIN/{1.73_M2}
GLUCOSE SERPL-MCNC: 96 MG/DL (ref 70–99)
HBA1C MFR BLD: 5.8 %
HDLC SERPL-MCNC: 32 MG/DL (ref 40–60)
LDLC SERPL CALC-MCNC: 26 MG/DL
MICROALBUMIN UR DL<=1MG/L-MCNC: 1.7 MG/DL
MICROALBUMIN/CREAT UR: 11.4 MG/G (ref 0–30)
POTASSIUM SERPL-SCNC: 4.1 MMOL/L (ref 3.5–5.1)
PROT SERPL-MCNC: 7.7 G/DL (ref 6.4–8.2)
SODIUM SERPL-SCNC: 140 MMOL/L (ref 136–145)
TRIGL SERPL-MCNC: 136 MG/DL (ref 0–150)
TSH SERPL DL<=0.005 MIU/L-ACNC: 1.97 UIU/ML (ref 0.27–4.2)
VLDLC SERPL CALC-MCNC: 27 MG/DL

## 2023-10-09 RX ORDER — CLOPIDOGREL BISULFATE 75 MG/1
TABLET ORAL
Qty: 90 TABLET | Refills: 3 | Status: SHIPPED | OUTPATIENT
Start: 2023-10-09

## 2023-11-02 ENCOUNTER — OFFICE VISIT (OUTPATIENT)
Dept: ENDOCRINOLOGY | Age: 38
End: 2023-11-02
Payer: COMMERCIAL

## 2023-11-02 VITALS
SYSTOLIC BLOOD PRESSURE: 129 MMHG | DIASTOLIC BLOOD PRESSURE: 73 MMHG | HEIGHT: 78 IN | BODY MASS INDEX: 36.45 KG/M2 | WEIGHT: 315 LBS | RESPIRATION RATE: 16 BRPM | HEART RATE: 94 BPM

## 2023-11-02 DIAGNOSIS — E06.3 HYPOTHYROIDISM DUE TO HASHIMOTO'S THYROIDITIS: ICD-10-CM

## 2023-11-02 DIAGNOSIS — E88.819 INSULIN RESISTANCE: ICD-10-CM

## 2023-11-02 DIAGNOSIS — E11.42 TYPE 2 DIABETES MELLITUS WITH DIABETIC POLYNEUROPATHY, WITH LONG-TERM CURRENT USE OF INSULIN (HCC): Primary | ICD-10-CM

## 2023-11-02 DIAGNOSIS — Z79.4 TYPE 2 DIABETES MELLITUS WITH DIABETIC POLYNEUROPATHY, WITH LONG-TERM CURRENT USE OF INSULIN (HCC): Primary | ICD-10-CM

## 2023-11-02 DIAGNOSIS — E03.8 HYPOTHYROIDISM DUE TO HASHIMOTO'S THYROIDITIS: ICD-10-CM

## 2023-11-02 PROCEDURE — 99214 OFFICE O/P EST MOD 30 MIN: CPT | Performed by: INTERNAL MEDICINE

## 2023-11-02 PROCEDURE — 3078F DIAST BP <80 MM HG: CPT | Performed by: INTERNAL MEDICINE

## 2023-11-02 PROCEDURE — 3044F HG A1C LEVEL LT 7.0%: CPT | Performed by: INTERNAL MEDICINE

## 2023-11-02 PROCEDURE — 3074F SYST BP LT 130 MM HG: CPT | Performed by: INTERNAL MEDICINE

## 2023-11-02 RX ORDER — TIRZEPATIDE 15 MG/.5ML
15 INJECTION, SOLUTION SUBCUTANEOUS WEEKLY
Qty: 4 ADJUSTABLE DOSE PRE-FILLED PEN SYRINGE | Refills: 4 | Status: SHIPPED | OUTPATIENT
Start: 2023-11-02

## 2023-11-02 RX ORDER — INSULIN GLARGINE 300 U/ML
INJECTION, SOLUTION SUBCUTANEOUS
Qty: 5 ADJUSTABLE DOSE PRE-FILLED PEN SYRINGE | Refills: 4 | Status: SHIPPED | OUTPATIENT
Start: 2023-11-02

## 2023-11-02 NOTE — PATIENT INSTRUCTIONS
Patient Education        phentermine  Pronunciation:  FEN ter Geanie Setters  Brand: Adipex-P, Shruthipolo May  What is the most important information I should know about phentermine? You should not use this medicine if you have glaucoma, overactive thyroid, severe heart problems, uncontrolled high blood pressure, advanced coronary artery disease, extreme agitation, or a history of drug abuse. Do not use this medicine if you have used an MAO inhibitor in the past 14 days, such as isocarboxazid, linezolid, methylene blue injection, phenelzine, rasagiline, selegiline, or tranylcypromine. What is phentermine? Phentermine is similar to an amphetamine. Phentermine stimulates the central nervous system (nerves and brain), which increases your heart rate and blood pressure and decreases your appetite. Phentermine is used together with diet and exercise to treat obesity, especially in people with risk factors such as high blood pressure, high cholesterol, or diabetes. Phentermine may also be used for purposes not listed in this medication guide. What should I discuss with my healthcare provider before taking phentermine? You should not use phentermine if you are allergic to it, or if you have:  a history of heart disease (coronary artery disease, heart rhythm problems, congestive heart failure, stroke);  severe or uncontrolled high blood pressure;  overactive thyroid;  glaucoma;  extreme agitation or nervousness;  a history of drug abuse; or  if you take other diet pills. Do not use phentermine if you have used an MAO inhibitor in the past 14 days. A dangerous drug interaction could occur. MAO inhibitors include isocarboxazid, linezolid, methylene blue injection, phenelzine, rasagiline, selegiline, tranylcypromine, and others. Weight loss during pregnancy can harm an unborn baby, even if you are overweight. Do not use phentermine if you are pregnant.  Tell your doctor right away if you become pregnant during

## 2023-11-03 ENCOUNTER — TELEPHONE (OUTPATIENT)
Dept: ENDOCRINOLOGY | Age: 38
End: 2023-11-03

## 2023-11-03 NOTE — TELEPHONE ENCOUNTER
Submitted PA for [facility] Key: [id number]   STATUS: Approved today  Your PA request has been approved.  he request was approved for the following time period:  08/01/2023 - 12/31/2024

## 2023-11-06 ENCOUNTER — TELEPHONE (OUTPATIENT)
Dept: ENDOCRINOLOGY | Age: 38
End: 2023-11-06

## 2023-11-06 NOTE — TELEPHONE ENCOUNTER
Call from [address] asking if the Dr would like to switch back to Ozempic 2mg dose because they do have that in stock or go ahead and [place] the [facility]     # [phone] - Shaere Cm

## 2023-11-06 NOTE — TELEPHONE ENCOUNTER
Returned pharmacies call and advised to go ahead and dispense the [facility]. D/c the Ozempic.

## 2023-11-21 DIAGNOSIS — E55.9 VITAMIN D DEFICIENCY: ICD-10-CM

## 2023-11-21 RX ORDER — ERGOCALCIFEROL 1.25 MG/1
CAPSULE ORAL
Qty: 4 CAPSULE | Refills: 5 | Status: SHIPPED | OUTPATIENT
Start: 2023-11-21

## 2023-12-05 ENCOUNTER — TELEPHONE (OUTPATIENT)
Dept: CARDIOLOGY CLINIC | Age: 38
End: 2023-12-05

## 2023-12-12 DIAGNOSIS — E11.42 TYPE 2 DIABETES MELLITUS WITH DIABETIC POLYNEUROPATHY, WITH LONG-TERM CURRENT USE OF INSULIN (HCC): ICD-10-CM

## 2023-12-12 DIAGNOSIS — Z79.4 TYPE 2 DIABETES MELLITUS WITH DIABETIC POLYNEUROPATHY, WITH LONG-TERM CURRENT USE OF INSULIN (HCC): ICD-10-CM

## 2023-12-12 DIAGNOSIS — E78.2 MIXED HYPERLIPIDEMIA: ICD-10-CM

## 2023-12-12 DIAGNOSIS — E06.3 HYPOTHYROIDISM DUE TO HASHIMOTO'S THYROIDITIS: ICD-10-CM

## 2023-12-12 DIAGNOSIS — E03.8 HYPOTHYROIDISM DUE TO HASHIMOTO'S THYROIDITIS: ICD-10-CM

## 2023-12-12 RX ORDER — LEVOTHYROXINE SODIUM 0.12 MG/1
TABLET ORAL
Qty: 30 TABLET | Refills: 5 | Status: SHIPPED | OUTPATIENT
Start: 2023-12-12

## 2023-12-12 RX ORDER — ICOSAPENT ETHYL 1000 MG/1
CAPSULE ORAL
Qty: 120 CAPSULE | Refills: 5 | Status: SHIPPED | OUTPATIENT
Start: 2023-12-12

## 2023-12-12 RX ORDER — EMPAGLIFLOZIN, METFORMIN HYDROCHLORIDE 12.5; 1 MG/1; MG/1
TABLET, EXTENDED RELEASE ORAL
Qty: 60 TABLET | Refills: 5 | Status: SHIPPED | OUTPATIENT
Start: 2023-12-12

## 2023-12-12 NOTE — TELEPHONE ENCOUNTER
Requested Prescriptions     Signed Prescriptions Disp Refills    Empagliflozin-metFORMIN HCl ER (SYNJARDY XR) 12.5-1000 MG TB24 60 tablet 5     Si TABLET BY MOUTH 2 TIMES A DAY     Authorizing Provider: Lb Bowers     Ordering User: ABDI ARMIJO    VASCEPA 1 g CAPS capsule 120 capsule 5     Si CAPSULES BY MOUTH 2 TIMES A DAY (PRESCRIBER OK IS NEEDED FOR NEXT REFILL)     Authorizing Provider: Lb Bowers     Ordering User: ABDI ARMIJO    levothyroxine (SYNTHROID) 125 MCG tablet 30 tablet 5     Sig: TAKE ONE TABLET DAILY (PRESCRIBER OK IS NEEDED FOR NEXT REFILL)     Authorizing Provider: Lb Taylor User: Gabino Kunkle

## 2024-03-04 RX ORDER — PEN NEEDLE, DIABETIC 29 G X1/2"
NEEDLE, DISPOSABLE MISCELLANEOUS
Qty: 100 EACH | Refills: 5 | Status: SHIPPED | OUTPATIENT
Start: 2024-03-04

## 2024-03-06 ENCOUNTER — TELEPHONE (OUTPATIENT)
Dept: ENDOCRINOLOGY | Age: 39
End: 2024-03-06

## 2024-03-08 NOTE — TELEPHONE ENCOUNTER
[facility] Humulin R U 500 from 12/1/23-12/31/24   no Zyclara Pregnancy And Lactation Text: This medication is Pregnancy Category C. It is unknown if this medication is excreted in breast milk.

## 2024-03-27 NOTE — PROGRESS NOTES
[facility]  4/5/24  Referring: Dr. Briana Pinto    REASON FOR CONSULT/CHIEF COMPLAINT/HPI     Reason for visit/ Chief complaint   SOB, lightheadedness/ dizziness    HPI Buzz Bower is a 38 y.o.here for re-evaluation of CAD after having a recent LAD stent in 2022.  He has had diabetes for over 20 years.    History is complicated by his autism. He is 6.7\" tall. He has a twin brother with Asperger's.    He had a LHC in August 2022 by Dr. Claros with PCI to LAD.     Today, he states that he has been having SOB and mild chest pain for awhile that is \"different\" than what he experienced prior to his cath. Exertional efforts don't make it worse. He denies PND, palpitations, light-headedness, edema. He enjoys playing video games. He drinks a lot of energy drinks. His mother is with him for the visit; she assists with questions but Buzz mostly answers for himself.    Patient is adherent with medications and is tolerating them well without side effects.     HISTORY/ALLERGIES/ROS     MedHx:  has a past medical history of ADHD (attention deficit hyperactivity disorder), Autism, Chicken pox, CKD (chronic kidney disease), Depression, Flu, Hx: UTI (urinary tract infection), Hyperlipidemia, Hyperlipidemia, Hypertension, Hypothyroidism, Insulin resistance, Microalbuminuria, Obesity, Type 2 diabetes mellitus with diabetic polyneuropathy, with long-term current use of insulin (HCC), Type 2 diabetes mellitus without complication (HCC), and Vitamin D deficiency.  SurgHx:  has a past surgical history that includes Tympanostomy tube placement and Appendectomy.   SocHx:  reports that he has never smoked. He has never used smokeless tobacco. He reports current alcohol use. He reports that he does not use drugs.   FamHx: Mother had CAD and stents at age 45  Allergies: Amoxicillin and Lisinopril     MEDICATIONS      Prior to Admission medications    Medication Sig Start Date End Date Taking? Authorizing Provider   FAYE JOHNSTON

## 2024-03-28 DIAGNOSIS — E06.3 HYPOTHYROIDISM DUE TO HASHIMOTO'S THYROIDITIS: ICD-10-CM

## 2024-03-28 DIAGNOSIS — Z79.4 TYPE 2 DIABETES MELLITUS WITH DIABETIC POLYNEUROPATHY, WITH LONG-TERM CURRENT USE OF INSULIN (HCC): ICD-10-CM

## 2024-03-28 DIAGNOSIS — E88.819 INSULIN RESISTANCE: ICD-10-CM

## 2024-03-28 DIAGNOSIS — E03.8 HYPOTHYROIDISM DUE TO HASHIMOTO'S THYROIDITIS: ICD-10-CM

## 2024-03-28 DIAGNOSIS — E11.42 TYPE 2 DIABETES MELLITUS WITH DIABETIC POLYNEUROPATHY, WITH LONG-TERM CURRENT USE OF INSULIN (HCC): ICD-10-CM

## 2024-03-28 LAB
CREAT UR-MCNC: 127 MG/DL (ref 39–259)
MICROALBUMIN UR DL<=1MG/L-MCNC: 2.6 MG/DL
MICROALBUMIN/CREAT UR: 20.5 MG/G (ref 0–30)
TSH SERPL DL<=0.005 MIU/L-ACNC: 0.3 UIU/ML (ref 0.27–4.2)

## 2024-03-29 LAB
ALBUMIN SERPL-MCNC: 4.7 G/DL (ref 3.4–5)
ALBUMIN/GLOB SERPL: 1.8 {RATIO} (ref 1.1–2.2)
ALP SERPL-CCNC: 58 U/L (ref 40–129)
ALT SERPL-CCNC: 27 U/L (ref 10–40)
ANION GAP SERPL CALCULATED.3IONS-SCNC: 17 MMOL/L (ref 3–16)
AST SERPL-CCNC: 22 U/L (ref 15–37)
BILIRUB SERPL-MCNC: 0.5 MG/DL (ref 0–1)
BUN SERPL-MCNC: 11 MG/DL (ref 7–20)
CALCIUM SERPL-MCNC: 9.6 MG/DL (ref 8.3–10.6)
CHLORIDE SERPL-SCNC: 102 MMOL/L (ref 99–110)
CHOLEST SERPL-MCNC: 98 MG/DL (ref 0–199)
CO2 SERPL-SCNC: 24 MMOL/L (ref 21–32)
CREAT SERPL-MCNC: 0.9 MG/DL (ref 0.9–1.3)
EST. AVERAGE GLUCOSE BLD GHB EST-MCNC: 116.9 MG/DL
GFR SERPLBLD CREATININE-BSD FMLA CKD-EPI: >90 ML/MIN/{1.73_M2}
GLUCOSE SERPL-MCNC: 133 MG/DL (ref 70–99)
HBA1C MFR BLD: 5.7 %
HDLC SERPL-MCNC: 35 MG/DL (ref 40–60)
LDLC SERPL CALC-MCNC: 27 MG/DL
POTASSIUM SERPL-SCNC: 4.3 MMOL/L (ref 3.5–5.1)
PROT SERPL-MCNC: 7.3 G/DL (ref 6.4–8.2)
SODIUM SERPL-SCNC: 143 MMOL/L (ref 136–145)
TRIGL SERPL-MCNC: 180 MG/DL (ref 0–150)
VLDLC SERPL CALC-MCNC: 36 MG/DL

## 2024-04-05 ENCOUNTER — OFFICE VISIT (OUTPATIENT)
Dept: CARDIOLOGY CLINIC | Age: 39
End: 2024-04-05
Payer: COMMERCIAL

## 2024-04-05 VITALS
OXYGEN SATURATION: 96 % | SYSTOLIC BLOOD PRESSURE: 130 MMHG | DIASTOLIC BLOOD PRESSURE: 76 MMHG | HEIGHT: 78 IN | WEIGHT: 315 LBS | HEART RATE: 86 BPM | BODY MASS INDEX: 36.45 KG/M2

## 2024-04-05 DIAGNOSIS — I25.83 CORONARY ARTERY DISEASE DUE TO LIPID RICH PLAQUE: ICD-10-CM

## 2024-04-05 DIAGNOSIS — R06.02 SOB (SHORTNESS OF BREATH): ICD-10-CM

## 2024-04-05 DIAGNOSIS — I10 ESSENTIAL HYPERTENSION: ICD-10-CM

## 2024-04-05 DIAGNOSIS — R07.9 CHEST PAIN, UNSPECIFIED TYPE: Primary | ICD-10-CM

## 2024-04-05 DIAGNOSIS — I25.10 CORONARY ARTERY DISEASE DUE TO LIPID RICH PLAQUE: ICD-10-CM

## 2024-04-05 PROCEDURE — 3075F SYST BP GE 130 - 139MM HG: CPT | Performed by: INTERNAL MEDICINE

## 2024-04-05 PROCEDURE — 93000 ELECTROCARDIOGRAM COMPLETE: CPT | Performed by: INTERNAL MEDICINE

## 2024-04-05 PROCEDURE — 99214 OFFICE O/P EST MOD 30 MIN: CPT | Performed by: INTERNAL MEDICINE

## 2024-04-05 PROCEDURE — 3078F DIAST BP <80 MM HG: CPT | Performed by: INTERNAL MEDICINE

## 2024-04-05 RX ORDER — INSULIN HUMAN 500 [IU]/ML
INJECTION, SOLUTION SUBCUTANEOUS
COMMUNITY
Start: 2024-03-14

## 2024-04-05 NOTE — PATIENT INSTRUCTIONS
OK to stop Plavix if stress test normal.    Limit the energy drinks to one per day. They can contribute to palpitations and other symptoms.

## 2024-04-16 ENCOUNTER — HOSPITAL ENCOUNTER (OUTPATIENT)
Dept: NON INVASIVE DIAGNOSTICS | Age: 39
Discharge: HOME | End: 2024-04-16
Payer: COMMERCIAL

## 2024-04-16 PROCEDURE — 93017 CV STRESS TEST TRACING ONLY: CPT | Performed by: INTERNAL MEDICINE

## 2024-04-16 PROCEDURE — A9502 TC99M TETROFOSMIN: HCPCS | Performed by: INTERNAL MEDICINE

## 2024-04-16 PROCEDURE — 3430000000 HC RX DIAGNOSTIC RADIOPHARMACEUTICAL: Performed by: INTERNAL MEDICINE

## 2024-04-16 PROCEDURE — 78452 HT MUSCLE IMAGE SPECT MULT: CPT

## 2024-04-16 RX ADMIN — TETROFOSMIN 10 MILLICURIE: 1.38 INJECTION, POWDER, LYOPHILIZED, FOR SOLUTION INTRAVENOUS at 08:25

## 2024-04-16 RX ADMIN — TETROFOSMIN 30 MILLICURIE: 1.38 INJECTION, POWDER, LYOPHILIZED, FOR SOLUTION INTRAVENOUS at 09:55

## 2024-04-16 NOTE — PROGRESS NOTES
Patient instructed on Hoang Protocol Stress Test Procedure including possible side effects and adverse reactions.  Verbalizes knowledge and understanding and denies having any questions.

## 2024-04-29 DIAGNOSIS — E11.42 TYPE 2 DIABETES MELLITUS WITH DIABETIC POLYNEUROPATHY, WITH LONG-TERM CURRENT USE OF INSULIN (HCC): Primary | ICD-10-CM

## 2024-04-29 DIAGNOSIS — Z79.4 TYPE 2 DIABETES MELLITUS WITH DIABETIC POLYNEUROPATHY, WITH LONG-TERM CURRENT USE OF INSULIN (HCC): Primary | ICD-10-CM

## 2024-04-29 RX ORDER — TIRZEPATIDE 15 MG/.5ML
INJECTION, SOLUTION SUBCUTANEOUS
Qty: 6 ML | Refills: 1 | Status: SHIPPED | OUTPATIENT
Start: 2024-04-29

## 2024-05-01 ENCOUNTER — OFFICE VISIT (OUTPATIENT)
Dept: ENDOCRINOLOGY | Age: 39
End: 2024-05-01

## 2024-05-01 VITALS
WEIGHT: 315 LBS | SYSTOLIC BLOOD PRESSURE: 110 MMHG | HEIGHT: 78 IN | RESPIRATION RATE: 16 BRPM | BODY MASS INDEX: 36.45 KG/M2 | DIASTOLIC BLOOD PRESSURE: 71 MMHG | HEART RATE: 79 BPM

## 2024-05-01 DIAGNOSIS — E78.2 MIXED HYPERLIPIDEMIA: ICD-10-CM

## 2024-05-01 DIAGNOSIS — E11.42 TYPE 2 DIABETES MELLITUS WITH DIABETIC POLYNEUROPATHY, WITH LONG-TERM CURRENT USE OF INSULIN (HCC): ICD-10-CM

## 2024-05-01 DIAGNOSIS — E03.8 HYPOTHYROIDISM DUE TO HASHIMOTO'S THYROIDITIS: ICD-10-CM

## 2024-05-01 DIAGNOSIS — I10 ESSENTIAL HYPERTENSION: ICD-10-CM

## 2024-05-01 DIAGNOSIS — E06.3 HYPOTHYROIDISM DUE TO HASHIMOTO'S THYROIDITIS: ICD-10-CM

## 2024-05-01 DIAGNOSIS — Z79.4 TYPE 2 DIABETES MELLITUS WITH DIABETIC POLYNEUROPATHY, WITH LONG-TERM CURRENT USE OF INSULIN (HCC): ICD-10-CM

## 2024-05-01 DIAGNOSIS — E55.9 VITAMIN D DEFICIENCY: ICD-10-CM

## 2024-05-01 RX ORDER — ICOSAPENT ETHYL 1000 MG/1
CAPSULE ORAL
Qty: 120 CAPSULE | Refills: 5 | Status: SHIPPED | OUTPATIENT
Start: 2024-05-01

## 2024-05-01 RX ORDER — LOSARTAN POTASSIUM AND HYDROCHLOROTHIAZIDE 12.5; 5 MG/1; MG/1
TABLET ORAL
Qty: 30 TABLET | Refills: 5 | Status: CANCELLED | OUTPATIENT
Start: 2024-05-01

## 2024-05-01 RX ORDER — EMPAGLIFLOZIN, METFORMIN HYDROCHLORIDE 12.5; 1 MG/1; MG/1
TABLET, EXTENDED RELEASE ORAL
Qty: 60 TABLET | Refills: 5 | Status: SHIPPED | OUTPATIENT
Start: 2024-05-01

## 2024-05-01 RX ORDER — LEVOTHYROXINE SODIUM 0.12 MG/1
TABLET ORAL
Qty: 30 TABLET | Refills: 5 | Status: SHIPPED | OUTPATIENT
Start: 2024-05-01

## 2024-05-01 RX ORDER — LOSARTAN POTASSIUM 50 MG/1
50 TABLET ORAL DAILY
Qty: 90 TABLET | Refills: 1 | Status: SHIPPED | OUTPATIENT
Start: 2024-05-01

## 2024-05-01 RX ORDER — GLUCAGON 3 MG/1
POWDER NASAL
Qty: 2 EACH | Refills: 3 | Status: SHIPPED | OUTPATIENT
Start: 2024-05-01

## 2024-05-01 RX ORDER — LANCETS 30 GAUGE
1 EACH MISCELLANEOUS DAILY
Qty: 100 EACH | Refills: 3 | Status: SHIPPED | OUTPATIENT
Start: 2024-05-01

## 2024-05-01 RX ORDER — ERGOCALCIFEROL 1.25 MG/1
CAPSULE ORAL
Qty: 4 CAPSULE | Refills: 5 | Status: SHIPPED | OUTPATIENT
Start: 2024-05-01

## 2024-05-01 RX ORDER — ROSUVASTATIN CALCIUM 40 MG/1
TABLET, COATED ORAL
Qty: 30 TABLET | Refills: 5 | Status: SHIPPED | OUTPATIENT
Start: 2024-05-01

## 2024-05-01 RX ORDER — BLOOD SUGAR DIAGNOSTIC
STRIP MISCELLANEOUS
Qty: 300 STRIP | Refills: 5 | Status: SHIPPED | OUTPATIENT
Start: 2024-05-01

## 2024-05-01 NOTE — PATIENT INSTRUCTIONS
Patient Education        phentermine  Pronunciation:  FEN ter meen  Brand:  Adipex-P, Lomaira, Suprenza  What is the most important information I should know about phentermine?  You should not use this medicine if you have glaucoma, overactive thyroid, severe heart problems, uncontrolled high blood pressure, advanced coronary artery disease, extreme agitation, or a history of drug abuse.  Do not use this medicine if you have used an MAO inhibitor in the past 14 days, such as isocarboxazid, linezolid, methylene blue injection, phenelzine, rasagiline, selegiline, or tranylcypromine.  What is phentermine?  Phentermine is similar to an amphetamine. Phentermine stimulates the central nervous system (nerves and brain), which increases your heart rate and blood pressure and decreases your appetite.  Phentermine is used together with diet and exercise to treat obesity, especially in people with risk factors such as high blood pressure, high cholesterol, or diabetes.  Phentermine may also be used for purposes not listed in this medication guide.  What should I discuss with my healthcare provider before taking phentermine?  You should not use phentermine if you are allergic to it, or if you have:  a history of heart disease (coronary artery disease, heart rhythm problems, congestive heart failure, stroke);  severe or uncontrolled high blood pressure;  overactive thyroid;  glaucoma;  extreme agitation or nervousness;  a history of drug abuse; or  if you take other diet pills.  Do not use phentermine if you have used an MAO inhibitor in the past 14 days. A dangerous drug interaction could occur. MAO inhibitors include isocarboxazid, linezolid, methylene blue injection, phenelzine, rasagiline, selegiline, tranylcypromine, and others.  Weight loss during pregnancy can harm an unborn baby, even if you are overweight. Do not use phentermine if you are pregnant. Tell your doctor right away if you become pregnant during

## 2024-05-01 NOTE — PROGRESS NOTES
insulin    ---Synjardy 12.5/1000 mg BID   --On Mounjaro 15 mg  weekly he  has lost approximately 20 pounds in the last 5 months.  Total weight loss in the last 1 year since May 2022 was approximately 65  pounds    Health maintenance   -advised to have annual dilated eye exam 2022 --- no retinopathy   - microalb/creat ratio was elevated in  april 2019  --saw Dr Dela Cruz in the past, most recent value normal in April 2024   -foot exam performed  follows with Podiatry Dr escoto 2022       -for his buffalo hump I did MSCx 2 which came back abnl I am not positive if he even did at the right time saw her in jan 2018 .  -1 mg dex suppression test was normal cortisol was <0.1.   --- repeat 24 hr urine cortisol in August 2019 was again normal.    CAD s/p stents  Follows with cardiology   He will discuss with cardiology if Ok to take Adipex     Hypothyroidism   On 112 mcg daily tsh normal in January 2023    --Patient has been taking his medication regularly he denies any use of iron or calcium with his thyroid medication   Pt appears clinically and biochemically euthyroid on current dose of Levothyroxine , will continue on same dose.      --thyroid uls was normal 8/2014   his Abs were negative.     Elevated LFTS now getting better   usg showed fatty liver   Discussed the need to follow up with PCP   Discussed weight loss in detail     Autism   stable     Hypertension   He is on Losaratn /hctz  BP low getting dizzy reduce   Lisinopril stopped in jan 2022 due to dry cough       Hyperlipidemia hypertriglyceridemia   Start Crestor 40 mg daily      VIT D DEF   levels improved from 5 ----24 >>26 >>28>>40   Advised to take 5000 iu dialy   advised to increase sun exposure     OBesity   Advised to step up his diet and exercise.  He lost 20 lbs in summer 2016   Lost another 20 lbs from nov to may 2024   Previous workup was negative     Depression   On effexor and welbutrin as per PCP      Reviewed and/or ordered clinical lab results

## 2024-07-01 RX ORDER — INSULIN HUMAN 500 [IU]/ML
INJECTION, SOLUTION SUBCUTANEOUS
Qty: 20 ML | Refills: 3 | Status: SHIPPED | OUTPATIENT
Start: 2024-07-01

## 2024-08-16 DIAGNOSIS — Z79.4 TYPE 2 DIABETES MELLITUS WITH DIABETIC POLYNEUROPATHY, WITH LONG-TERM CURRENT USE OF INSULIN (HCC): ICD-10-CM

## 2024-08-16 DIAGNOSIS — E03.8 HYPOTHYROIDISM DUE TO HASHIMOTO'S THYROIDITIS: ICD-10-CM

## 2024-08-16 DIAGNOSIS — E55.9 VITAMIN D DEFICIENCY: ICD-10-CM

## 2024-08-16 DIAGNOSIS — E78.2 MIXED HYPERLIPIDEMIA: ICD-10-CM

## 2024-08-16 DIAGNOSIS — E06.3 HYPOTHYROIDISM DUE TO HASHIMOTO'S THYROIDITIS: ICD-10-CM

## 2024-08-16 DIAGNOSIS — E11.42 TYPE 2 DIABETES MELLITUS WITH DIABETIC POLYNEUROPATHY, WITH LONG-TERM CURRENT USE OF INSULIN (HCC): ICD-10-CM

## 2024-08-16 DIAGNOSIS — I10 ESSENTIAL HYPERTENSION: ICD-10-CM

## 2024-08-17 LAB
ALBUMIN SERPL-MCNC: 4.8 G/DL (ref 3.4–5)
ALBUMIN/GLOB SERPL: 1.7 {RATIO} (ref 1.1–2.2)
ALP SERPL-CCNC: 72 U/L (ref 40–129)
ALT SERPL-CCNC: 33 U/L (ref 10–40)
ANION GAP SERPL CALCULATED.3IONS-SCNC: 15 MMOL/L (ref 3–16)
AST SERPL-CCNC: 30 U/L (ref 15–37)
BILIRUB SERPL-MCNC: 0.7 MG/DL (ref 0–1)
BUN SERPL-MCNC: 16 MG/DL (ref 7–20)
CALCIUM SERPL-MCNC: 9.4 MG/DL (ref 8.3–10.6)
CHLORIDE SERPL-SCNC: 101 MMOL/L (ref 99–110)
CHOLEST SERPL-MCNC: 68 MG/DL (ref 0–199)
CO2 SERPL-SCNC: 24 MMOL/L (ref 21–32)
CREAT SERPL-MCNC: 1.2 MG/DL (ref 0.9–1.3)
CREAT UR-MCNC: 157 MG/DL (ref 39–259)
EST. AVERAGE GLUCOSE BLD GHB EST-MCNC: 111.2 MG/DL
GFR SERPLBLD CREATININE-BSD FMLA CKD-EPI: 80 ML/MIN/{1.73_M2}
GLUCOSE SERPL-MCNC: 91 MG/DL (ref 70–99)
HBA1C MFR BLD: 5.5 %
HDLC SERPL-MCNC: 26 MG/DL (ref 40–60)
LDLC SERPL CALC-MCNC: 14 MG/DL
MICROALBUMIN UR DL<=1MG/L-MCNC: 1.89 MG/DL
MICROALBUMIN/CREAT UR: 12 MG/G (ref 0–30)
POTASSIUM SERPL-SCNC: 4.6 MMOL/L (ref 3.5–5.1)
PROT SERPL-MCNC: 7.7 G/DL (ref 6.4–8.2)
SODIUM SERPL-SCNC: 140 MMOL/L (ref 136–145)
TRIGL SERPL-MCNC: 140 MG/DL (ref 0–150)
TSH SERPL DL<=0.005 MIU/L-ACNC: 1.06 UIU/ML (ref 0.27–4.2)
VLDLC SERPL CALC-MCNC: 28 MG/DL

## 2024-08-20 DIAGNOSIS — E11.42 TYPE 2 DIABETES MELLITUS WITH DIABETIC POLYNEUROPATHY, WITH LONG-TERM CURRENT USE OF INSULIN (HCC): ICD-10-CM

## 2024-08-20 DIAGNOSIS — Z79.4 TYPE 2 DIABETES MELLITUS WITH DIABETIC POLYNEUROPATHY, WITH LONG-TERM CURRENT USE OF INSULIN (HCC): ICD-10-CM

## 2024-08-20 RX ORDER — TIRZEPATIDE 15 MG/.5ML
INJECTION, SOLUTION SUBCUTANEOUS
Qty: 6 ML | Refills: 1 | Status: SHIPPED | OUTPATIENT
Start: 2024-08-20

## 2024-08-26 DIAGNOSIS — E11.42 TYPE 2 DIABETES MELLITUS WITH DIABETIC POLYNEUROPATHY, WITH LONG-TERM CURRENT USE OF INSULIN (HCC): Primary | ICD-10-CM

## 2024-08-26 DIAGNOSIS — Z79.4 TYPE 2 DIABETES MELLITUS WITH DIABETIC POLYNEUROPATHY, WITH LONG-TERM CURRENT USE OF INSULIN (HCC): Primary | ICD-10-CM

## 2024-08-26 RX ORDER — PEN NEEDLE, DIABETIC 29 G X1/2"
NEEDLE, DISPOSABLE MISCELLANEOUS
Qty: 100 EACH | Refills: 11 | Status: SHIPPED | OUTPATIENT
Start: 2024-08-26

## 2024-09-18 ENCOUNTER — OFFICE VISIT (OUTPATIENT)
Dept: ENDOCRINOLOGY | Age: 39
End: 2024-09-18
Payer: COMMERCIAL

## 2024-09-18 VITALS
DIASTOLIC BLOOD PRESSURE: 74 MMHG | HEART RATE: 81 BPM | HEIGHT: 78 IN | WEIGHT: 298 LBS | BODY MASS INDEX: 34.48 KG/M2 | RESPIRATION RATE: 14 BRPM | SYSTOLIC BLOOD PRESSURE: 109 MMHG | TEMPERATURE: 98 F

## 2024-09-18 DIAGNOSIS — E11.42 TYPE 2 DIABETES MELLITUS WITH DIABETIC POLYNEUROPATHY, WITH LONG-TERM CURRENT USE OF INSULIN (HCC): ICD-10-CM

## 2024-09-18 DIAGNOSIS — E78.2 MIXED HYPERLIPIDEMIA: ICD-10-CM

## 2024-09-18 DIAGNOSIS — E03.8 HYPOTHYROIDISM DUE TO HASHIMOTO'S THYROIDITIS: ICD-10-CM

## 2024-09-18 DIAGNOSIS — E06.3 HYPOTHYROIDISM DUE TO HASHIMOTO'S THYROIDITIS: ICD-10-CM

## 2024-09-18 DIAGNOSIS — Z79.4 TYPE 2 DIABETES MELLITUS WITH DIABETIC POLYNEUROPATHY, WITH LONG-TERM CURRENT USE OF INSULIN (HCC): ICD-10-CM

## 2024-09-18 DIAGNOSIS — I10 ESSENTIAL HYPERTENSION: ICD-10-CM

## 2024-09-18 DIAGNOSIS — E55.9 VITAMIN D DEFICIENCY: ICD-10-CM

## 2024-09-18 PROCEDURE — 3044F HG A1C LEVEL LT 7.0%: CPT | Performed by: INTERNAL MEDICINE

## 2024-09-18 PROCEDURE — 99214 OFFICE O/P EST MOD 30 MIN: CPT | Performed by: INTERNAL MEDICINE

## 2024-09-18 PROCEDURE — 3074F SYST BP LT 130 MM HG: CPT | Performed by: INTERNAL MEDICINE

## 2024-09-18 PROCEDURE — 3078F DIAST BP <80 MM HG: CPT | Performed by: INTERNAL MEDICINE

## 2024-09-18 RX ORDER — INSULIN HUMAN 500 [IU]/ML
INJECTION, SOLUTION SUBCUTANEOUS
Qty: 20 ML | Refills: 3 | Status: SHIPPED | OUTPATIENT
Start: 2024-09-18

## 2024-09-18 RX ORDER — ROSUVASTATIN CALCIUM 40 MG/1
TABLET, COATED ORAL
Qty: 30 TABLET | Refills: 5 | Status: SHIPPED | OUTPATIENT
Start: 2024-09-18

## 2024-09-18 RX ORDER — TIRZEPATIDE 15 MG/.5ML
15 INJECTION, SOLUTION SUBCUTANEOUS WEEKLY
Qty: 6 ML | Refills: 1 | Status: SHIPPED | OUTPATIENT
Start: 2024-09-18

## 2024-09-18 RX ORDER — ERGOCALCIFEROL 1.25 MG/1
CAPSULE, LIQUID FILLED ORAL
Qty: 4 CAPSULE | Refills: 5 | Status: SHIPPED | OUTPATIENT
Start: 2024-09-18

## 2024-09-18 RX ORDER — ICOSAPENT ETHYL 1000 MG/1
CAPSULE ORAL
Qty: 120 CAPSULE | Refills: 5 | Status: SHIPPED | OUTPATIENT
Start: 2024-09-18

## 2024-09-18 RX ORDER — LEVOTHYROXINE SODIUM 125 UG/1
TABLET ORAL
Qty: 30 TABLET | Refills: 5 | Status: SHIPPED | OUTPATIENT
Start: 2024-09-18

## 2024-09-18 RX ORDER — EMPAGLIFLOZIN, METFORMIN HYDROCHLORIDE 12.5; 1 MG/1; MG/1
TABLET, EXTENDED RELEASE ORAL
Qty: 60 TABLET | Refills: 5 | Status: SHIPPED | OUTPATIENT
Start: 2024-09-18

## 2024-09-23 RX ORDER — CLOPIDOGREL BISULFATE 75 MG/1
TABLET ORAL
Qty: 30 TABLET | Refills: 3 | OUTPATIENT
Start: 2024-09-23

## 2024-09-23 NOTE — TELEPHONE ENCOUNTER
Received refill request for clopidogrel (PLAVIX) 75 MG  from [facility].     Last OV: 4/5/2024 WAK    Next OV:     Last Labs: 3/28/2024 CBC    Last Filled: 10/9/2023 PSC

## 2024-10-21 DIAGNOSIS — Z79.4 TYPE 2 DIABETES MELLITUS WITH DIABETIC POLYNEUROPATHY, WITH LONG-TERM CURRENT USE OF INSULIN (HCC): ICD-10-CM

## 2024-10-21 DIAGNOSIS — I10 ESSENTIAL HYPERTENSION: ICD-10-CM

## 2024-10-21 DIAGNOSIS — E55.9 VITAMIN D DEFICIENCY: ICD-10-CM

## 2024-10-21 DIAGNOSIS — E06.3 HYPOTHYROIDISM DUE TO HASHIMOTO'S THYROIDITIS: ICD-10-CM

## 2024-10-21 DIAGNOSIS — E78.2 MIXED HYPERLIPIDEMIA: ICD-10-CM

## 2024-10-21 DIAGNOSIS — E11.42 TYPE 2 DIABETES MELLITUS WITH DIABETIC POLYNEUROPATHY, WITH LONG-TERM CURRENT USE OF INSULIN (HCC): ICD-10-CM

## 2024-10-21 RX ORDER — CLOPIDOGREL BISULFATE 75 MG/1
TABLET ORAL
Qty: 30 TABLET | Refills: 3 | OUTPATIENT
Start: 2024-10-21

## 2024-10-21 RX ORDER — LOSARTAN POTASSIUM 50 MG/1
TABLET ORAL
Qty: 30 TABLET | Refills: 3 | Status: SHIPPED | OUTPATIENT
Start: 2024-10-21

## 2024-12-02 DIAGNOSIS — E78.2 MIXED HYPERLIPIDEMIA: ICD-10-CM

## 2024-12-02 DIAGNOSIS — Z79.4 TYPE 2 DIABETES MELLITUS WITH DIABETIC POLYNEUROPATHY, WITH LONG-TERM CURRENT USE OF INSULIN (HCC): ICD-10-CM

## 2024-12-02 DIAGNOSIS — E11.42 TYPE 2 DIABETES MELLITUS WITH DIABETIC POLYNEUROPATHY, WITH LONG-TERM CURRENT USE OF INSULIN (HCC): ICD-10-CM

## 2024-12-02 DIAGNOSIS — E06.3 HYPOTHYROIDISM DUE TO HASHIMOTO'S THYROIDITIS: ICD-10-CM

## 2024-12-02 DIAGNOSIS — I10 ESSENTIAL HYPERTENSION: ICD-10-CM

## 2024-12-02 LAB
ALBUMIN SERPL-MCNC: 4.9 G/DL (ref 3.4–5)
ALBUMIN/GLOB SERPL: 1.8 {RATIO} (ref 1.1–2.2)
ALP SERPL-CCNC: 69 U/L (ref 40–129)
ALT SERPL-CCNC: 29 U/L (ref 10–40)
ANION GAP SERPL CALCULATED.3IONS-SCNC: 13 MMOL/L (ref 3–16)
AST SERPL-CCNC: 28 U/L (ref 15–37)
BILIRUB SERPL-MCNC: 0.7 MG/DL (ref 0–1)
BUN SERPL-MCNC: 16 MG/DL (ref 7–20)
CALCIUM SERPL-MCNC: 10 MG/DL (ref 8.3–10.6)
CHLORIDE SERPL-SCNC: 104 MMOL/L (ref 99–110)
CHOLEST SERPL-MCNC: 78 MG/DL (ref 0–199)
CO2 SERPL-SCNC: 26 MMOL/L (ref 21–32)
CREAT SERPL-MCNC: 1 MG/DL (ref 0.9–1.3)
CREAT UR-MCNC: 136 MG/DL (ref 39–259)
EST. AVERAGE GLUCOSE BLD GHB EST-MCNC: 99.7 MG/DL
GFR SERPLBLD CREATININE-BSD FMLA CKD-EPI: >90 ML/MIN/{1.73_M2}
GLUCOSE SERPL-MCNC: 106 MG/DL (ref 70–99)
HBA1C MFR BLD: 5.1 %
HDLC SERPL-MCNC: 36 MG/DL (ref 40–60)
LDLC SERPL CALC-MCNC: 19 MG/DL
MICROALBUMIN UR DL<=1MG/L-MCNC: 2.47 MG/DL
MICROALBUMIN/CREAT UR: 18.2 MG/G (ref 0–30)
POTASSIUM SERPL-SCNC: 4.5 MMOL/L (ref 3.5–5.1)
PROT SERPL-MCNC: 7.6 G/DL (ref 6.4–8.2)
SODIUM SERPL-SCNC: 143 MMOL/L (ref 136–145)
TRIGL SERPL-MCNC: 113 MG/DL (ref 0–150)
TSH SERPL DL<=0.005 MIU/L-ACNC: 0.32 UIU/ML (ref 0.27–4.2)
VLDLC SERPL CALC-MCNC: 23 MG/DL

## 2024-12-10 ENCOUNTER — OFFICE VISIT (OUTPATIENT)
Dept: ENDOCRINOLOGY | Age: 39
End: 2024-12-10
Payer: COMMERCIAL

## 2024-12-10 VITALS
BODY MASS INDEX: 32.97 KG/M2 | WEIGHT: 285 LBS | HEIGHT: 78 IN | SYSTOLIC BLOOD PRESSURE: 118 MMHG | DIASTOLIC BLOOD PRESSURE: 73 MMHG | HEART RATE: 91 BPM

## 2024-12-10 DIAGNOSIS — E11.42 TYPE 2 DIABETES MELLITUS WITH DIABETIC POLYNEUROPATHY, WITH LONG-TERM CURRENT USE OF INSULIN (HCC): ICD-10-CM

## 2024-12-10 DIAGNOSIS — I10 ESSENTIAL HYPERTENSION: ICD-10-CM

## 2024-12-10 DIAGNOSIS — E66.01 CLASS 3 SEVERE OBESITY DUE TO EXCESS CALORIES WITH SERIOUS COMORBIDITY AND BODY MASS INDEX (BMI) OF 40.0 TO 44.9 IN ADULT: ICD-10-CM

## 2024-12-10 DIAGNOSIS — Z79.4 TYPE 2 DIABETES MELLITUS WITH DIABETIC POLYNEUROPATHY, WITH LONG-TERM CURRENT USE OF INSULIN (HCC): Primary | ICD-10-CM

## 2024-12-10 DIAGNOSIS — Z79.4 TYPE 2 DIABETES MELLITUS WITH DIABETIC POLYNEUROPATHY, WITH LONG-TERM CURRENT USE OF INSULIN (HCC): ICD-10-CM

## 2024-12-10 DIAGNOSIS — N18.2 CKD (CHRONIC KIDNEY DISEASE) STAGE 2, GFR 60-89 ML/MIN: ICD-10-CM

## 2024-12-10 DIAGNOSIS — E66.813 CLASS 3 SEVERE OBESITY DUE TO EXCESS CALORIES WITH SERIOUS COMORBIDITY AND BODY MASS INDEX (BMI) OF 40.0 TO 44.9 IN ADULT: ICD-10-CM

## 2024-12-10 DIAGNOSIS — E11.42 TYPE 2 DIABETES MELLITUS WITH DIABETIC POLYNEUROPATHY, WITH LONG-TERM CURRENT USE OF INSULIN (HCC): Primary | ICD-10-CM

## 2024-12-10 LAB — LIPASE SERPL-CCNC: 58 U/L (ref 13–60)

## 2024-12-10 PROCEDURE — 99214 OFFICE O/P EST MOD 30 MIN: CPT | Performed by: INTERNAL MEDICINE

## 2024-12-10 PROCEDURE — 3044F HG A1C LEVEL LT 7.0%: CPT | Performed by: INTERNAL MEDICINE

## 2024-12-10 PROCEDURE — 3074F SYST BP LT 130 MM HG: CPT | Performed by: INTERNAL MEDICINE

## 2024-12-10 PROCEDURE — 3078F DIAST BP <80 MM HG: CPT | Performed by: INTERNAL MEDICINE

## 2024-12-10 NOTE — PROGRESS NOTES
Lynnette Bower is a 35 y.o. male who follows in endocrine clinic for now controlled Type 2 diabetes mellitus, hypertension ,hyperlipidemia, hypothyroidism  and  severe obesity .    He was  diagnosed with diabetes at age 14 years he was started on Oral pills metfromin for years and then he was switched to insulin he was on 70/30 combination LYNNETTE is a high function autistic adult , he was  followed at [facility] in the past , and he has a twin brother who is a diabetic too. Pt  lives with his mom but still fixes his own meals and skips his insulin doses occasionally and doesn't follow a diabetic diet and doesn't exercise much either ---Mom is a diabetic too but she lets Lynnette make his own decision and according to mom she has noticed a lot of noncompliance on pt's part .   He is also noted to have Vit d level was only 5 initially   He also has hypertension and is on meds   He was diagnosed with ADHD and follows with his PCP   He  lost 40 lbs in summer 2016 and his finger stick blood glucose  improved , he gained  the weight back   Pt also has hyperlipidemia and is on medication  He is diagnosed with coronary artery disease status post stents in August 2022 and now follows with cardiology    INTERIM:    Lost another 12 lbs since last visit in sept 2024   Still continues to eat poorly and make bad food choices and advised to work on those.  Happy with diabetes control and weight loss  His blood pressure was running low at home he  stopped  taking the blood pressure medication as he was feeling dizzy      Past Medical History:   Diagnosis Date    ADHD (attention deficit hyperactivity disorder)     Autism     Chicken pox     CKD (chronic kidney disease)     Depression     Down syndrome     Flu 03/06/2019    Hx: UTI (urinary tract infection)     Hyperlipidemia     Hyperlipidemia 8/23/2018    Hypertension     Hypothyroidism     Insulin resistance 2/5/2018    Microalbuminuria     Obesity     Type 2 diabetes mellitus with

## 2025-01-15 ENCOUNTER — TELEPHONE (OUTPATIENT)
Dept: ENDOCRINOLOGY | Age: 40
End: 2025-01-15

## 2025-01-17 NOTE — TELEPHONE ENCOUNTER
[facility]  Your PA request has been approved. The request was approved for the following time period: 10/01/2024 - 12/31/2025 Type of coverage approved: Prior Authorization  Authorization Expiration Date: 12/30/2025    If this requires a response please respond to the pool ( P MHCX PSC MEDICATION PRE-AUTH).      Thank you please advise patient.

## 2025-01-17 NOTE — TELEPHONE ENCOUNTER
Submitted PA for Humulin R U-500  [facility]ia [facility] Key: [id number] STATUS: PENDING.    Follow up done daily; if no decision with in three days we will refax.  If another three days goes by with no decision will call the insurance for status.

## 2025-03-06 DIAGNOSIS — Z79.4 TYPE 2 DIABETES MELLITUS WITH DIABETIC POLYNEUROPATHY, WITH LONG-TERM CURRENT USE OF INSULIN (HCC): ICD-10-CM

## 2025-03-06 DIAGNOSIS — E11.42 TYPE 2 DIABETES MELLITUS WITH DIABETIC POLYNEUROPATHY, WITH LONG-TERM CURRENT USE OF INSULIN (HCC): ICD-10-CM

## 2025-03-06 LAB
ALBUMIN SERPL-MCNC: 4.7 G/DL (ref 3.4–5)
ALBUMIN/GLOB SERPL: 1.8 {RATIO} (ref 1.1–2.2)
ALP SERPL-CCNC: 54 U/L (ref 40–129)
ALT SERPL-CCNC: 31 U/L (ref 10–40)
ANION GAP SERPL CALCULATED.3IONS-SCNC: 11 MMOL/L (ref 3–16)
AST SERPL-CCNC: 26 U/L (ref 15–37)
BILIRUB SERPL-MCNC: 0.6 MG/DL (ref 0–1)
BUN SERPL-MCNC: 9 MG/DL (ref 7–20)
CALCIUM SERPL-MCNC: 9.8 MG/DL (ref 8.3–10.6)
CHLORIDE SERPL-SCNC: 103 MMOL/L (ref 99–110)
CHOLEST SERPL-MCNC: 95 MG/DL (ref 0–199)
CO2 SERPL-SCNC: 29 MMOL/L (ref 21–32)
CREAT SERPL-MCNC: 0.9 MG/DL (ref 0.9–1.3)
CREAT UR-MCNC: 100 MG/DL (ref 39–259)
GFR SERPLBLD CREATININE-BSD FMLA CKD-EPI: >90 ML/MIN/{1.73_M2}
GLUCOSE SERPL-MCNC: 99 MG/DL (ref 70–99)
HDLC SERPL-MCNC: 42 MG/DL (ref 40–60)
LDLC SERPL CALC-MCNC: 32 MG/DL
MICROALBUMIN UR DL<=1MG/L-MCNC: 2.27 MG/DL
MICROALBUMIN/CREAT UR: 22.7 MG/G (ref 0–30)
POTASSIUM SERPL-SCNC: 4.4 MMOL/L (ref 3.5–5.1)
PROT SERPL-MCNC: 7.3 G/DL (ref 6.4–8.2)
SODIUM SERPL-SCNC: 143 MMOL/L (ref 136–145)
TRIGL SERPL-MCNC: 104 MG/DL (ref 0–150)
TSH SERPL DL<=0.005 MIU/L-ACNC: 0.75 UIU/ML (ref 0.27–4.2)
VLDLC SERPL CALC-MCNC: 21 MG/DL

## 2025-03-07 LAB
EST. AVERAGE GLUCOSE BLD GHB EST-MCNC: 99.7 MG/DL
HBA1C MFR BLD: 5.1 %

## 2025-03-12 DIAGNOSIS — E78.2 MIXED HYPERLIPIDEMIA: ICD-10-CM

## 2025-03-12 DIAGNOSIS — E55.9 VITAMIN D DEFICIENCY: ICD-10-CM

## 2025-03-12 DIAGNOSIS — E06.3 HYPOTHYROIDISM DUE TO HASHIMOTO'S THYROIDITIS: ICD-10-CM

## 2025-03-12 DIAGNOSIS — I10 ESSENTIAL HYPERTENSION: ICD-10-CM

## 2025-03-12 DIAGNOSIS — Z79.4 TYPE 2 DIABETES MELLITUS WITH DIABETIC POLYNEUROPATHY, WITH LONG-TERM CURRENT USE OF INSULIN (HCC): ICD-10-CM

## 2025-03-12 DIAGNOSIS — E11.42 TYPE 2 DIABETES MELLITUS WITH DIABETIC POLYNEUROPATHY, WITH LONG-TERM CURRENT USE OF INSULIN (HCC): ICD-10-CM

## 2025-03-12 RX ORDER — ERGOCALCIFEROL 1.25 MG/1
CAPSULE, LIQUID FILLED ORAL
Qty: 4 CAPSULE | Refills: 6 | Status: SHIPPED | OUTPATIENT
Start: 2025-03-12

## 2025-03-12 RX ORDER — ICOSAPENT ETHYL 1000 MG/1
CAPSULE ORAL
Qty: 120 CAPSULE | Refills: 6 | Status: SHIPPED | OUTPATIENT
Start: 2025-03-12

## 2025-03-12 RX ORDER — LEVOTHYROXINE SODIUM 125 UG/1
TABLET ORAL
Qty: 30 TABLET | Refills: 6 | Status: SHIPPED | OUTPATIENT
Start: 2025-03-12

## 2025-03-12 RX ORDER — EMPAGLIFLOZIN, METFORMIN HYDROCHLORIDE 12.5; 1 MG/1; MG/1
TABLET, EXTENDED RELEASE ORAL
Qty: 60 TABLET | Refills: 6 | Status: SHIPPED | OUTPATIENT
Start: 2025-03-12

## 2025-03-12 RX ORDER — TIRZEPATIDE 15 MG/.5ML
INJECTION, SOLUTION SUBCUTANEOUS
Qty: 6 ML | Refills: 2 | Status: SHIPPED | OUTPATIENT
Start: 2025-03-12

## 2025-03-12 NOTE — TELEPHONE ENCOUNTER
Medication:   Requested Prescriptions     Pending Prescriptions Disp Refills    vitamin D (ERGOCALCIFEROL) 1.25 MG (99126 UT) CAPS capsule [Pharmacy Med Name: VITAMIN D 50,000 UNIT EPIC 1.25 MG Capsule] 4 capsule 6     Si CAPSULE BY MOUTH ONCE EACH WEEK (PRESCRIBER OK IS NEEDED FOR NEXT REFILL)    Empagliflozin-metFORMIN HCl ER (SYNJARDY XR) 12.5-1000 MG TB24 [Pharmacy Med Name: SYNJARDY XR 12.5-1,000 MG 12.5-1000 Tablet] 60 tablet 6     Si TABLET BY MOUTH 2 TIMES A DAY (PRESCRIBER OK IS NEEDED FOR NEXT REFILL)    VASCEPA 1 g CAPS capsule [Pharmacy Med Name: VASCEPA 1 GM CAPSULE 1 Capsule] 120 capsule 6     Si CAPSULES BY MOUTH 2 TIMES A DAY (PRESCRIBER OK IS NEEDED FOR NEXT REFILL)    levothyroxine (SYNTHROID) 125 MCG tablet [Pharmacy Med Name: LEVOTHYROXINE 125MCG TAB  Tablet] 30 tablet 6     Sig: TAKE ONE TABLET DAILY (PRESCRIBER OK IS NEEDED FOR NEXT REFILL)    MOUNJARO 15 MG/0.5ML SOAJ [Pharmacy Med Name: MOUNJARO 15 MG/0.5ML SOPN 15 Solution Auto-injector] 6 mL 2     Sig: INJECT 0.5 MLS INTO THE SKIN ONCE A WEEK AS DIRECTED (PRESCRIBER OK IS NEEDED FOR NEXT REFILL)       Last Filled:      Patient Phone Number: [phone] (home)     Last appt: 12/10/2024   Next appt: 2025    Last Labs DM:   Lab Results   Component Value Date/Time    LABA1C 5.1 2025 03:57 PM

## 2025-03-31 DIAGNOSIS — I10 ESSENTIAL HYPERTENSION: ICD-10-CM

## 2025-03-31 DIAGNOSIS — E78.2 MIXED HYPERLIPIDEMIA: ICD-10-CM

## 2025-03-31 DIAGNOSIS — Z79.4 TYPE 2 DIABETES MELLITUS WITH DIABETIC POLYNEUROPATHY, WITH LONG-TERM CURRENT USE OF INSULIN (HCC): ICD-10-CM

## 2025-03-31 DIAGNOSIS — E06.3 HYPOTHYROIDISM DUE TO HASHIMOTO'S THYROIDITIS: ICD-10-CM

## 2025-03-31 DIAGNOSIS — E11.42 TYPE 2 DIABETES MELLITUS WITH DIABETIC POLYNEUROPATHY, WITH LONG-TERM CURRENT USE OF INSULIN (HCC): ICD-10-CM

## 2025-03-31 DIAGNOSIS — E55.9 VITAMIN D DEFICIENCY: ICD-10-CM

## 2025-03-31 RX ORDER — ROSUVASTATIN CALCIUM 40 MG/1
TABLET, COATED ORAL
Qty: 30 TABLET | Refills: 6 | Status: SHIPPED | OUTPATIENT
Start: 2025-03-31

## 2025-03-31 NOTE — TELEPHONE ENCOUNTER
Medication:   Requested Prescriptions     Pending Prescriptions Disp Refills    rosuvastatin (CRESTOR) 40 MG tablet [Pharmacy Med Name: ROSUVASTATIN 40MG TAB CAMBE 40 Tablet] 30 tablet 6     Si TABLET BY MOUTH ONCE DAILY (PRESCRIBER OK IS NEEDED FOR NEXT REFILL)         Last appt: 12/10/2024   Next appt: 2025    Last Labs DM:   Lab Results   Component Value Date/Time    LABA1C 5.1 2025 03:57 PM     Last Lipid:   Lab Results   Component Value Date/Time    CHOL 95 2025 03:57 PM    TRIG 104 2025 03:57 PM    HDL 42 2025 03:57 PM     Last PSA: No results found for: \"PSA\"  Last Thyroid:   Lab Results   Component Value Date/Time    TSH 0.75 2025 03:57 PM    TSH 0.28 2024 01:07 PM    TSH 2.190 2018 11:08 AM

## 2025-04-08 ENCOUNTER — OFFICE VISIT (OUTPATIENT)
Dept: ENDOCRINOLOGY | Age: 40
End: 2025-04-08
Payer: COMMERCIAL

## 2025-04-08 VITALS
HEIGHT: 78 IN | WEIGHT: 281 LBS | SYSTOLIC BLOOD PRESSURE: 115 MMHG | RESPIRATION RATE: 14 BRPM | BODY MASS INDEX: 32.51 KG/M2 | TEMPERATURE: 98 F | DIASTOLIC BLOOD PRESSURE: 70 MMHG | HEART RATE: 80 BPM

## 2025-04-08 DIAGNOSIS — E11.42 TYPE 2 DIABETES MELLITUS WITH DIABETIC POLYNEUROPATHY, WITH LONG-TERM CURRENT USE OF INSULIN (HCC): ICD-10-CM

## 2025-04-08 DIAGNOSIS — E78.2 MIXED HYPERLIPIDEMIA: ICD-10-CM

## 2025-04-08 DIAGNOSIS — Z79.4 TYPE 2 DIABETES MELLITUS WITH DIABETIC POLYNEUROPATHY, WITH LONG-TERM CURRENT USE OF INSULIN (HCC): ICD-10-CM

## 2025-04-08 DIAGNOSIS — E78.2 MIXED HYPERLIPIDEMIA: Primary | ICD-10-CM

## 2025-04-08 DIAGNOSIS — E06.3 HYPOTHYROIDISM DUE TO HASHIMOTO'S THYROIDITIS: ICD-10-CM

## 2025-04-08 DIAGNOSIS — I10 ESSENTIAL HYPERTENSION: ICD-10-CM

## 2025-04-08 PROCEDURE — 3044F HG A1C LEVEL LT 7.0%: CPT | Performed by: INTERNAL MEDICINE

## 2025-04-08 PROCEDURE — 3074F SYST BP LT 130 MM HG: CPT | Performed by: INTERNAL MEDICINE

## 2025-04-08 PROCEDURE — 3078F DIAST BP <80 MM HG: CPT | Performed by: INTERNAL MEDICINE

## 2025-04-08 PROCEDURE — 99214 OFFICE O/P EST MOD 30 MIN: CPT | Performed by: INTERNAL MEDICINE

## 2025-04-08 RX ORDER — METOPROLOL SUCCINATE 25 MG/1
TABLET, EXTENDED RELEASE ORAL
COMMUNITY
Start: 2025-03-13

## 2025-04-08 RX ORDER — TIRZEPATIDE 15 MG/.5ML
INJECTION, SOLUTION SUBCUTANEOUS
Qty: 6 ML | Refills: 2 | Status: SHIPPED | OUTPATIENT
Start: 2025-04-08

## 2025-04-08 RX ORDER — PANTOPRAZOLE SODIUM 20 MG/1
20 TABLET, DELAYED RELEASE ORAL
Qty: 90 TABLET | Refills: 1 | Status: SHIPPED | OUTPATIENT
Start: 2025-04-08

## 2025-04-08 NOTE — PROGRESS NOTES
cortisol was <0.1.   --- repeat 24 hr urine cortisol in August 2019 was again normal.      Depression   On effexor and welbutrin as per PCP      Reviewed and/or ordered clinical lab results Yes  Reviewed and/or ordered radiology tests Yes  Reviewed and/or ordered other diagnostic tests Yes  Made a decision to obtain old records Yes  Reviewed and summarized old records Yes      Buzz Bower was counseled regarding symptoms of current diagnosis, course and complications of disease if inadequately treated, side effects of medications, diagnosis, treatment options, and prognosis, risks, benefits, complications, and alternatives of treatment, labs, imaging and other studies and treatment targets and goals.  He understands instructions and counseling.      These diagnosis were discussed and reviewed with the patient including the advantages of drug therapy. He was counseled at this visit on the following: diabetes complication prevention and foot care.    Return in about 3 months (around 8/5/2021).

## 2025-04-09 ENCOUNTER — RESULTS FOLLOW-UP (OUTPATIENT)
Dept: ENDOCRINOLOGY | Age: 40
End: 2025-04-09

## 2025-04-09 LAB — LIPASE SERPL-CCNC: 56 U/L (ref 13–60)

## 2025-05-30 DIAGNOSIS — E55.9 VITAMIN D DEFICIENCY: ICD-10-CM

## 2025-05-30 DIAGNOSIS — I10 ESSENTIAL HYPERTENSION: ICD-10-CM

## 2025-05-30 DIAGNOSIS — E11.42 TYPE 2 DIABETES MELLITUS WITH DIABETIC POLYNEUROPATHY, WITH LONG-TERM CURRENT USE OF INSULIN (HCC): ICD-10-CM

## 2025-05-30 DIAGNOSIS — Z79.4 TYPE 2 DIABETES MELLITUS WITH DIABETIC POLYNEUROPATHY, WITH LONG-TERM CURRENT USE OF INSULIN (HCC): ICD-10-CM

## 2025-05-30 DIAGNOSIS — E06.3 HYPOTHYROIDISM DUE TO HASHIMOTO'S THYROIDITIS: ICD-10-CM

## 2025-05-30 DIAGNOSIS — E78.2 MIXED HYPERLIPIDEMIA: ICD-10-CM

## 2025-05-30 RX ORDER — BLOOD SUGAR DIAGNOSTIC
STRIP MISCELLANEOUS
Qty: 300 STRIP | Refills: 5 | Status: SHIPPED | OUTPATIENT
Start: 2025-05-30

## 2025-06-10 ENCOUNTER — HOSPITAL ENCOUNTER (EMERGENCY)
Age: 40
Discharge: HOME | End: 2025-06-10
Attending: EMERGENCY MEDICINE
Payer: COMMERCIAL

## 2025-06-10 VITALS
RESPIRATION RATE: 18 BRPM | HEIGHT: 78 IN | TEMPERATURE: 97.9 F | BODY MASS INDEX: 32.62 KG/M2 | WEIGHT: 281.9 LBS | SYSTOLIC BLOOD PRESSURE: 139 MMHG | OXYGEN SATURATION: 96 % | HEART RATE: 96 BPM | DIASTOLIC BLOOD PRESSURE: 74 MMHG

## 2025-06-10 DIAGNOSIS — S41.112A LACERATION OF LEFT UPPER EXTREMITY, INITIAL ENCOUNTER: Primary | ICD-10-CM

## 2025-06-10 DIAGNOSIS — Z72.89 DELIBERATE SELF-CUTTING: ICD-10-CM

## 2025-06-10 PROCEDURE — 12001 RPR S/N/AX/GEN/TRNK 2.5CM/<: CPT

## 2025-06-10 PROCEDURE — 99282 EMERGENCY DEPT VISIT SF MDM: CPT

## 2025-06-10 RX ORDER — LIDOCAINE HYDROCHLORIDE AND EPINEPHRINE 10; 10 MG/ML; UG/ML
20 INJECTION, SOLUTION INFILTRATION; PERINEURAL ONCE
Status: DISCONTINUED | OUTPATIENT
Start: 2025-06-10 | End: 2025-06-10 | Stop reason: HOSPADM

## 2025-06-10 ASSESSMENT — ENCOUNTER SYMPTOMS
CHEST TIGHTNESS: 0
DIARRHEA: 0
VOMITING: 0
NAUSEA: 0
SHORTNESS OF BREATH: 0
ABDOMINAL PAIN: 0

## 2025-06-10 ASSESSMENT — LIFESTYLE VARIABLES
HOW OFTEN DO YOU HAVE A DRINK CONTAINING ALCOHOL: NEVER
HOW MANY STANDARD DRINKS CONTAINING ALCOHOL DO YOU HAVE ON A TYPICAL DAY: PATIENT DOES NOT DRINK

## 2025-06-10 ASSESSMENT — PAIN - FUNCTIONAL ASSESSMENT: PAIN_FUNCTIONAL_ASSESSMENT: NONE - DENIES PAIN

## 2025-06-10 NOTE — VIRTUAL HEALTH
- CNP on 6/10/2025 at 11:04 AM.    END OF NOTE  -------------------------            Buzz Bower, was evaluated through a synchronous (real-time) audio-video encounter. The patient (and/or guardian if applicable) is aware that this is a billable service, which includes applicable co-pays. This virtual visit was conducted with patient's (and/or legal guardian's) consent. Patient identification was verified, and a caregiver was present when appropriate.  The patient was located at Facility (Appt Department): [facility] EMERGENCY DEPARTMENT  [address] [address]  Loc: [phone]  The provider was located at Home (City/State): [place]  Confirm you are appropriately licensed, registered, or certified to deliver care in the state where the patient is located as indicated above. If you are not or unsure, please re-schedule the visit: Yes, I confirm.   [place] Consult to Tele-Psych  Consult performed by: Page Booker APRN - CNP  Consult ordered by: Jaylon Manrique MD  Reason for consult: self harming behavior         Total time spent on this encounter: Not billed by time    --RICARDO Michaels CNP on 6/10/2025 at 11:03 AM    An electronic signature was used to authenticate this note.

## 2025-06-10 NOTE — ED NOTES
Patient discharged  to home in stable condition via private car with mother.  Discharge instructions and prescriptions reviewed with patient.   Patient verbalized understanding.   Patient advised not to drive, drink ETOH or operate machinery while taking pain medications at home.  Patient verbalized understanding.   All belongings in tow including discharge paperwork.     Patient alert and oriented.  Skin appropriate for ethnicity, dry and intact.  No signs of acute distress noted at this time. Regular respiratory pattern, normal respiratory depth, unlabored respirations.
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
## 2025-06-23 ENCOUNTER — TELEPHONE (OUTPATIENT)
Dept: ENDOCRINOLOGY | Age: 40
End: 2025-06-23

## 2025-06-25 ENCOUNTER — TELEPHONE (OUTPATIENT)
Dept: ENDOCRINOLOGY | Age: 40
End: 2025-06-25

## 2025-06-25 DIAGNOSIS — I10 ESSENTIAL HYPERTENSION: ICD-10-CM

## 2025-06-25 DIAGNOSIS — E11.42 TYPE 2 DIABETES MELLITUS WITH DIABETIC POLYNEUROPATHY, WITH LONG-TERM CURRENT USE OF INSULIN (HCC): ICD-10-CM

## 2025-06-25 DIAGNOSIS — E55.9 VITAMIN D DEFICIENCY: ICD-10-CM

## 2025-06-25 DIAGNOSIS — E78.2 MIXED HYPERLIPIDEMIA: ICD-10-CM

## 2025-06-25 DIAGNOSIS — E06.3 HYPOTHYROIDISM DUE TO HASHIMOTO'S THYROIDITIS: ICD-10-CM

## 2025-06-25 DIAGNOSIS — Z79.4 TYPE 2 DIABETES MELLITUS WITH DIABETIC POLYNEUROPATHY, WITH LONG-TERM CURRENT USE OF INSULIN (HCC): ICD-10-CM

## 2025-06-25 NOTE — TELEPHONE ENCOUNTER
Patient mother calling about problems with the patient test strips pharmacy states that they can't fill because it to many strips please call

## 2025-06-27 RX ORDER — BLOOD SUGAR DIAGNOSTIC
STRIP MISCELLANEOUS
Qty: 200 STRIP | Refills: 5 | Status: SHIPPED | OUTPATIENT
Start: 2025-06-27

## 2025-06-27 NOTE — TELEPHONE ENCOUNTER
Script updated to lower amount of strips.     Medication:   Requested Prescriptions     Signed Prescriptions Disp Refills    blood glucose test strips (ONETOUCH ULTRA) strip 200 strip 5     Sig: USE TO TEST 6 TIMES A DAY AND AS NEEDED FOR SYMPTOMS OF IRREGULAR BLOOD SUGAR     Authorizing Provider: JOSE MATHIS     Ordering User: ABDI ARMIJO         Last appt: 4/8/2025   Next appt: 8/14/2025    Last Labs DM:   Lab Results   Component Value Date/Time    LABA1C 5.1 03/06/2025 03:57 PM     Last Lipid:   Lab Results   Component Value Date/Time    CHOL 95 03/06/2025 03:57 PM    TRIG 104 03/06/2025 03:57 PM    HDL 42 03/06/2025 03:57 PM     Last PSA: No results found for: \"PSA\"  Last Thyroid:   Lab Results   Component Value Date/Time    TSH 0.75 03/06/2025 03:57 PM    TSH 0.28 03/28/2024 01:07 PM    TSH 2.190 08/20/2018 11:08 AM

## 2025-08-11 DIAGNOSIS — E06.3 HYPOTHYROIDISM DUE TO HASHIMOTO'S THYROIDITIS: ICD-10-CM

## 2025-08-11 DIAGNOSIS — E11.42 TYPE 2 DIABETES MELLITUS WITH DIABETIC POLYNEUROPATHY, WITH LONG-TERM CURRENT USE OF INSULIN (HCC): ICD-10-CM

## 2025-08-11 DIAGNOSIS — I10 ESSENTIAL HYPERTENSION: ICD-10-CM

## 2025-08-11 DIAGNOSIS — Z79.4 TYPE 2 DIABETES MELLITUS WITH DIABETIC POLYNEUROPATHY, WITH LONG-TERM CURRENT USE OF INSULIN (HCC): ICD-10-CM

## 2025-08-11 DIAGNOSIS — E78.2 MIXED HYPERLIPIDEMIA: ICD-10-CM

## 2025-08-11 LAB
ALBUMIN SERPL-MCNC: 4.8 G/DL (ref 3.4–5)
ALBUMIN/GLOB SERPL: 1.8 {RATIO} (ref 1.1–2.2)
ALP SERPL-CCNC: 53 U/L (ref 40–129)
ALT SERPL-CCNC: 38 U/L (ref 10–40)
ANION GAP SERPL CALCULATED.3IONS-SCNC: 11 MMOL/L (ref 3–16)
AST SERPL-CCNC: 28 U/L (ref 15–37)
BILIRUB SERPL-MCNC: 0.7 MG/DL (ref 0–1)
BUN SERPL-MCNC: 12 MG/DL (ref 7–20)
CALCIUM SERPL-MCNC: 9.4 MG/DL (ref 8.3–10.6)
CHLORIDE SERPL-SCNC: 103 MMOL/L (ref 99–110)
CHOLEST SERPL-MCNC: 79 MG/DL (ref 0–199)
CO2 SERPL-SCNC: 26 MMOL/L (ref 21–32)
CREAT SERPL-MCNC: 0.9 MG/DL (ref 0.9–1.3)
CREAT UR-MCNC: 117 MG/DL (ref 39–259)
EST. AVERAGE GLUCOSE BLD GHB EST-MCNC: 102.5 MG/DL
GFR SERPLBLD CREATININE-BSD FMLA CKD-EPI: >90 ML/MIN/{1.73_M2}
GLUCOSE SERPL-MCNC: 98 MG/DL (ref 70–99)
HBA1C MFR BLD: 5.2 %
HDLC SERPL-MCNC: 37 MG/DL (ref 40–60)
LDLC SERPL CALC-MCNC: 22 MG/DL
LIPASE SERPL-CCNC: 57 U/L (ref 13–60)
MICROALBUMIN UR DL<=1MG/L-MCNC: <1.2 MG/DL
MICROALBUMIN/CREAT UR: NORMAL MG/G (ref 0–30)
POTASSIUM SERPL-SCNC: 4.5 MMOL/L (ref 3.5–5.1)
PROT SERPL-MCNC: 7.4 G/DL (ref 6.4–8.2)
SODIUM SERPL-SCNC: 140 MMOL/L (ref 136–145)
TRIGL SERPL-MCNC: 101 MG/DL (ref 0–150)
TSH SERPL DL<=0.005 MIU/L-ACNC: 0.55 UIU/ML (ref 0.27–4.2)
VLDLC SERPL CALC-MCNC: 20 MG/DL

## 2025-08-14 ENCOUNTER — OFFICE VISIT (OUTPATIENT)
Dept: ENDOCRINOLOGY | Age: 40
End: 2025-08-14
Payer: COMMERCIAL

## 2025-08-14 VITALS
HEART RATE: 100 BPM | DIASTOLIC BLOOD PRESSURE: 62 MMHG | HEIGHT: 78 IN | WEIGHT: 270.4 LBS | OXYGEN SATURATION: 98 % | RESPIRATION RATE: 16 BRPM | TEMPERATURE: 98 F | BODY MASS INDEX: 31.29 KG/M2 | SYSTOLIC BLOOD PRESSURE: 81 MMHG

## 2025-08-14 DIAGNOSIS — E11.42 TYPE 2 DIABETES MELLITUS WITH DIABETIC POLYNEUROPATHY, WITH LONG-TERM CURRENT USE OF INSULIN (HCC): ICD-10-CM

## 2025-08-14 DIAGNOSIS — E06.3 HYPOTHYROIDISM DUE TO HASHIMOTO'S THYROIDITIS: ICD-10-CM

## 2025-08-14 DIAGNOSIS — E78.2 MIXED HYPERLIPIDEMIA: ICD-10-CM

## 2025-08-14 DIAGNOSIS — Z79.4 TYPE 2 DIABETES MELLITUS WITH DIABETIC POLYNEUROPATHY, WITH LONG-TERM CURRENT USE OF INSULIN (HCC): ICD-10-CM

## 2025-08-14 DIAGNOSIS — E55.9 VITAMIN D DEFICIENCY: ICD-10-CM

## 2025-08-14 DIAGNOSIS — I10 ESSENTIAL HYPERTENSION: ICD-10-CM

## 2025-08-14 PROCEDURE — 99214 OFFICE O/P EST MOD 30 MIN: CPT | Performed by: INTERNAL MEDICINE

## 2025-08-14 PROCEDURE — 3074F SYST BP LT 130 MM HG: CPT | Performed by: INTERNAL MEDICINE

## 2025-08-14 PROCEDURE — 3044F HG A1C LEVEL LT 7.0%: CPT | Performed by: INTERNAL MEDICINE

## 2025-08-14 PROCEDURE — 3078F DIAST BP <80 MM HG: CPT | Performed by: INTERNAL MEDICINE

## 2025-08-14 RX ORDER — AVOBENZONE, HOMOSALATE, OCTISALATE, OCTOCRYLENE 30; 40; 45; 26 MG/ML; MG/ML; MG/ML; MG/ML
1 CREAM TOPICAL DAILY
Qty: 100 EACH | Refills: 3 | Status: SHIPPED | OUTPATIENT
Start: 2025-08-14

## 2025-08-21 ENCOUNTER — APPOINTMENT (OUTPATIENT)
Dept: GENERAL RADIOLOGY | Age: 40
End: 2025-08-21
Payer: COMMERCIAL

## 2025-08-21 ENCOUNTER — APPOINTMENT (OUTPATIENT)
Dept: CT IMAGING | Age: 40
End: 2025-08-21
Payer: COMMERCIAL

## 2025-08-21 ENCOUNTER — HOSPITAL ENCOUNTER (EMERGENCY)
Age: 40
Discharge: HOME | End: 2025-08-22
Attending: STUDENT IN AN ORGANIZED HEALTH CARE EDUCATION/TRAINING PROGRAM
Payer: COMMERCIAL

## 2025-08-21 VITALS
HEART RATE: 91 BPM | RESPIRATION RATE: 15 BRPM | SYSTOLIC BLOOD PRESSURE: 106 MMHG | HEIGHT: 78 IN | WEIGHT: 268 LBS | TEMPERATURE: 98 F | DIASTOLIC BLOOD PRESSURE: 76 MMHG | OXYGEN SATURATION: 100 % | BODY MASS INDEX: 31.01 KG/M2

## 2025-08-21 DIAGNOSIS — R55 SYNCOPE AND COLLAPSE: Primary | ICD-10-CM

## 2025-08-21 DIAGNOSIS — S82.832A CLOSED FRACTURE OF PROXIMAL END OF LEFT FIBULA, UNSPECIFIED FRACTURE MORPHOLOGY, INITIAL ENCOUNTER: ICD-10-CM

## 2025-08-21 DIAGNOSIS — S09.90XA CLOSED HEAD INJURY, INITIAL ENCOUNTER: ICD-10-CM

## 2025-08-21 DIAGNOSIS — Z79.899 CURRENT USE OF BETA BLOCKER: ICD-10-CM

## 2025-08-21 DIAGNOSIS — I95.1 ORTHOSTATIC SYNCOPE: ICD-10-CM

## 2025-08-21 LAB
ALBUMIN SERPL-MCNC: 4.9 G/DL (ref 3.4–5)
ALBUMIN/GLOB SERPL: 1.6 {RATIO} (ref 1.1–2.2)
ALP SERPL-CCNC: 60 U/L (ref 40–129)
ALT SERPL-CCNC: 31 U/L (ref 10–40)
ANION GAP SERPL CALCULATED.3IONS-SCNC: 13 MMOL/L (ref 3–16)
AST SERPL-CCNC: 26 U/L (ref 15–37)
BASOPHILS # BLD: 0 K/UL (ref 0–0.2)
BASOPHILS NFR BLD: 0.3 %
BILIRUB SERPL-MCNC: 0.7 MG/DL (ref 0–1)
BUN SERPL-MCNC: 10 MG/DL (ref 7–20)
CALCIUM SERPL-MCNC: 9.6 MG/DL (ref 8.3–10.6)
CHLORIDE SERPL-SCNC: 102 MMOL/L (ref 99–110)
CO2 SERPL-SCNC: 24 MMOL/L (ref 21–32)
CREAT SERPL-MCNC: 1 MG/DL (ref 0.9–1.3)
D-DIMER QUANTITATIVE: 1.46 UG/ML FEU (ref 0–0.6)
DEPRECATED RDW RBC AUTO: 12.3 % (ref 12.4–15.4)
EOSINOPHIL # BLD: 0.1 K/UL (ref 0–0.6)
EOSINOPHIL NFR BLD: 0.8 %
GFR SERPLBLD CREATININE-BSD FMLA CKD-EPI: >90 ML/MIN/{1.73_M2}
GLUCOSE SERPL-MCNC: 110 MG/DL (ref 70–99)
HCT VFR BLD AUTO: 47.2 % (ref 40.5–52.5)
HGB BLD-MCNC: 16.2 G/DL (ref 13.5–17.5)
LYMPHOCYTES # BLD: 2.2 K/UL (ref 1–5.1)
LYMPHOCYTES NFR BLD: 26.7 %
MCH RBC QN AUTO: 29 PG (ref 26–34)
MCHC RBC AUTO-ENTMCNC: 34.2 G/DL (ref 31–36)
MCV RBC AUTO: 84.8 FL (ref 80–100)
MONOCYTES # BLD: 0.6 K/UL (ref 0–1.3)
MONOCYTES NFR BLD: 6.6 %
NEUTROPHILS # BLD: 5.5 K/UL (ref 1.7–7.7)
NEUTROPHILS NFR BLD: 65.6 %
NT-PROBNP SERPL-MCNC: <36 PG/ML (ref 0–124)
PLATELET # BLD AUTO: 191 K/UL (ref 135–450)
PMV BLD AUTO: 9.4 FL (ref 5–10.5)
POTASSIUM SERPL-SCNC: 4.6 MMOL/L (ref 3.5–5.1)
PROT SERPL-MCNC: 8 G/DL (ref 6.4–8.2)
RBC # BLD AUTO: 5.57 M/UL (ref 4.2–5.9)
SODIUM SERPL-SCNC: 139 MMOL/L (ref 136–145)
TROPONIN, HIGH SENSITIVITY: 11 NG/L (ref 0–22)
TROPONIN, HIGH SENSITIVITY: 15 NG/L (ref 0–22)
WBC # BLD AUTO: 8.4 K/UL (ref 4–11)

## 2025-08-21 PROCEDURE — 80053 COMPREHEN METABOLIC PANEL: CPT

## 2025-08-21 PROCEDURE — 93005 ELECTROCARDIOGRAM TRACING: CPT | Performed by: STUDENT IN AN ORGANIZED HEALTH CARE EDUCATION/TRAINING PROGRAM

## 2025-08-21 PROCEDURE — 70450 CT HEAD/BRAIN W/O DYE: CPT

## 2025-08-21 PROCEDURE — 29515 APPLICATION SHORT LEG SPLINT: CPT

## 2025-08-21 PROCEDURE — 96361 HYDRATE IV INFUSION ADD-ON: CPT

## 2025-08-21 PROCEDURE — 84484 ASSAY OF TROPONIN QUANT: CPT

## 2025-08-21 PROCEDURE — 29505 APPLICATION LONG LEG SPLINT: CPT

## 2025-08-21 PROCEDURE — 73590 X-RAY EXAM OF LOWER LEG: CPT

## 2025-08-21 PROCEDURE — 83880 ASSAY OF NATRIURETIC PEPTIDE: CPT

## 2025-08-21 PROCEDURE — 99285 EMERGENCY DEPT VISIT HI MDM: CPT

## 2025-08-21 PROCEDURE — 96360 HYDRATION IV INFUSION INIT: CPT

## 2025-08-21 PROCEDURE — 85025 COMPLETE CBC W/AUTO DIFF WBC: CPT

## 2025-08-21 PROCEDURE — 71260 CT THORAX DX C+: CPT

## 2025-08-21 PROCEDURE — 2580000003 HC RX 258: Performed by: NURSE PRACTITIONER

## 2025-08-21 PROCEDURE — 6360000004 HC RX CONTRAST MEDICATION: Performed by: NURSE PRACTITIONER

## 2025-08-21 PROCEDURE — 71045 X-RAY EXAM CHEST 1 VIEW: CPT

## 2025-08-21 PROCEDURE — 73610 X-RAY EXAM OF ANKLE: CPT

## 2025-08-21 PROCEDURE — 85379 FIBRIN DEGRADATION QUANT: CPT

## 2025-08-21 RX ORDER — HYDROCODONE BITARTRATE AND ACETAMINOPHEN 5; 325 MG/1; MG/1
1 TABLET ORAL EVERY 4 HOURS PRN
Qty: 18 TABLET | Refills: 0 | Status: SHIPPED | OUTPATIENT
Start: 2025-08-21 | End: 2025-08-24

## 2025-08-21 RX ORDER — 0.9 % SODIUM CHLORIDE 0.9 %
1000 INTRAVENOUS SOLUTION INTRAVENOUS ONCE
Status: COMPLETED | OUTPATIENT
Start: 2025-08-21 | End: 2025-08-21

## 2025-08-21 RX ORDER — IOPAMIDOL 755 MG/ML
75 INJECTION, SOLUTION INTRAVASCULAR
Status: COMPLETED | OUTPATIENT
Start: 2025-08-21 | End: 2025-08-21

## 2025-08-21 RX ADMIN — SODIUM CHLORIDE 1000 ML: 0.9 INJECTION, SOLUTION INTRAVENOUS at 19:26

## 2025-08-21 RX ADMIN — IOPAMIDOL 75 ML: 755 INJECTION, SOLUTION INTRAVENOUS at 21:07

## 2025-08-21 RX ADMIN — SODIUM CHLORIDE 1000 ML: 0.9 INJECTION, SOLUTION INTRAVENOUS at 21:18

## 2025-08-21 ASSESSMENT — ENCOUNTER SYMPTOMS
ABDOMINAL PAIN: 0
SHORTNESS OF BREATH: 0
CHEST TIGHTNESS: 0
VOMITING: 0
NAUSEA: 0
DIARRHEA: 0

## 2025-08-21 ASSESSMENT — LIFESTYLE VARIABLES: HOW OFTEN DO YOU HAVE A DRINK CONTAINING ALCOHOL: NEVER

## 2025-08-22 LAB
EKG ATRIAL RATE: 87 BPM
EKG DIAGNOSIS: NORMAL
EKG P AXIS: 53 DEGREES
EKG P-R INTERVAL: 124 MS
EKG Q-T INTERVAL: 354 MS
EKG QRS DURATION: 90 MS
EKG QTC CALCULATION (BAZETT): 425 MS
EKG R AXIS: 65 DEGREES
EKG T AXIS: 55 DEGREES
EKG VENTRICULAR RATE: 87 BPM

## 2025-08-22 PROCEDURE — 93010 ELECTROCARDIOGRAM REPORT: CPT | Performed by: INTERNAL MEDICINE

## 2025-08-26 ENCOUNTER — OFFICE VISIT (OUTPATIENT)
Dept: ORTHOPEDIC SURGERY | Age: 40
End: 2025-08-26

## 2025-08-26 VITALS — BODY MASS INDEX: 31.01 KG/M2 | HEIGHT: 78 IN | WEIGHT: 268 LBS

## 2025-08-26 DIAGNOSIS — S82.832A CLOSED FRACTURE OF PROXIMAL END OF LEFT FIBULA, UNSPECIFIED FRACTURE MORPHOLOGY, INITIAL ENCOUNTER: Primary | ICD-10-CM

## 2025-08-27 ENCOUNTER — OFFICE VISIT (OUTPATIENT)
Dept: CARDIOLOGY CLINIC | Age: 40
End: 2025-08-27
Payer: COMMERCIAL

## 2025-08-27 VITALS
OXYGEN SATURATION: 97 % | HEART RATE: 117 BPM | WEIGHT: 262 LBS | DIASTOLIC BLOOD PRESSURE: 72 MMHG | BODY MASS INDEX: 30.31 KG/M2 | SYSTOLIC BLOOD PRESSURE: 116 MMHG | HEIGHT: 78 IN

## 2025-08-27 DIAGNOSIS — R55 SYNCOPE, UNSPECIFIED SYNCOPE TYPE: Primary | ICD-10-CM

## 2025-08-27 DIAGNOSIS — Z79.4 TYPE 2 DIABETES MELLITUS WITH DIABETIC POLYNEUROPATHY, WITH LONG-TERM CURRENT USE OF INSULIN (HCC): ICD-10-CM

## 2025-08-27 DIAGNOSIS — I25.83 CORONARY ARTERY DISEASE DUE TO LIPID RICH PLAQUE: ICD-10-CM

## 2025-08-27 DIAGNOSIS — I10 ESSENTIAL HYPERTENSION: ICD-10-CM

## 2025-08-27 DIAGNOSIS — E78.2 MIXED HYPERLIPIDEMIA: ICD-10-CM

## 2025-08-27 DIAGNOSIS — E11.42 TYPE 2 DIABETES MELLITUS WITH DIABETIC POLYNEUROPATHY, WITH LONG-TERM CURRENT USE OF INSULIN (HCC): ICD-10-CM

## 2025-08-27 DIAGNOSIS — I25.10 CORONARY ARTERY DISEASE DUE TO LIPID RICH PLAQUE: ICD-10-CM

## 2025-08-27 PROCEDURE — 3078F DIAST BP <80 MM HG: CPT | Performed by: NURSE PRACTITIONER

## 2025-08-27 PROCEDURE — 3074F SYST BP LT 130 MM HG: CPT | Performed by: NURSE PRACTITIONER

## 2025-08-27 PROCEDURE — 3044F HG A1C LEVEL LT 7.0%: CPT | Performed by: NURSE PRACTITIONER

## 2025-08-27 PROCEDURE — 99214 OFFICE O/P EST MOD 30 MIN: CPT | Performed by: NURSE PRACTITIONER

## 2025-08-27 RX ORDER — FOLIC ACID/MULTIVIT,IRON,MINER 0.4MG-18MG
TABLET ORAL DAILY
COMMUNITY

## 2025-09-02 ENCOUNTER — OFFICE VISIT (OUTPATIENT)
Dept: ORTHOPEDIC SURGERY | Age: 40
End: 2025-09-02
Payer: COMMERCIAL

## 2025-09-02 VITALS — BODY MASS INDEX: 31.01 KG/M2 | WEIGHT: 268 LBS | HEIGHT: 78 IN

## 2025-09-02 DIAGNOSIS — M25.572 LEFT ANKLE PAIN, UNSPECIFIED CHRONICITY: Primary | ICD-10-CM

## 2025-09-02 DIAGNOSIS — S82.832A CLOSED FRACTURE OF PROXIMAL END OF LEFT FIBULA, UNSPECIFIED FRACTURE MORPHOLOGY, INITIAL ENCOUNTER: ICD-10-CM

## 2025-09-02 PROCEDURE — 99213 OFFICE O/P EST LOW 20 MIN: CPT | Performed by: ORTHOPAEDIC SURGERY

## 2025-09-02 RX ORDER — HYDROCODONE BITARTRATE AND ACETAMINOPHEN 5; 325 MG/1; MG/1
1 TABLET ORAL EVERY 4 HOURS PRN
Qty: 18 TABLET | Refills: 0 | Status: SHIPPED | OUTPATIENT
Start: 2025-09-02 | End: 2025-09-05